# Patient Record
Sex: FEMALE | URBAN - METROPOLITAN AREA
[De-identification: names, ages, dates, MRNs, and addresses within clinical notes are randomized per-mention and may not be internally consistent; named-entity substitution may affect disease eponyms.]

---

## 2018-08-28 ENCOUNTER — APPOINTMENT (RX ONLY)
Dept: URBAN - METROPOLITAN AREA CLINIC 98 | Facility: CLINIC | Age: 63
Setting detail: DERMATOLOGY
End: 2018-08-28

## 2018-08-28 DIAGNOSIS — L23.9 ALLERGIC CONTACT DERMATITIS, UNSPECIFIED CAUSE: ICD-10-CM

## 2018-08-28 DIAGNOSIS — D485 NEOPLASM OF UNCERTAIN BEHAVIOR OF SKIN: ICD-10-CM

## 2018-08-28 PROBLEM — L85.3 XEROSIS CUTIS: Status: ACTIVE | Noted: 2018-08-28

## 2018-08-28 PROBLEM — D48.5 NEOPLASM OF UNCERTAIN BEHAVIOR OF SKIN: Status: ACTIVE | Noted: 2018-08-28

## 2018-08-28 PROBLEM — L30.9 DERMATITIS, UNSPECIFIED: Status: ACTIVE | Noted: 2018-08-28

## 2018-08-28 PROCEDURE — ? BIOPSY BY SHAVE METHOD

## 2018-08-28 PROCEDURE — 99202 OFFICE O/P NEW SF 15 MIN: CPT | Mod: 25

## 2018-08-28 PROCEDURE — ? TREATMENT REGIMEN

## 2018-08-28 PROCEDURE — ? PRESCRIPTION

## 2018-08-28 PROCEDURE — ? COUNSELING

## 2018-08-28 PROCEDURE — 11100: CPT

## 2018-08-28 RX ORDER — BETAMETHASONE DIPROPIONATE 0.5 MG/G
CREAM TOPICAL
Qty: 1 | Refills: 0 | Status: ERX | COMMUNITY
Start: 2018-08-28

## 2018-08-28 RX ADMIN — BETAMETHASONE DIPROPIONATE: 0.5 CREAM TOPICAL at 15:15

## 2018-08-28 ASSESSMENT — LOCATION ZONE DERM
LOCATION ZONE: FEET
LOCATION ZONE: LEG

## 2018-08-28 ASSESSMENT — LOCATION DETAILED DESCRIPTION DERM
LOCATION DETAILED: 2ND WEBSPACE RIGHT FOOT
LOCATION DETAILED: 1ST WEBSPACE RIGHT FOOT
LOCATION DETAILED: 3RD WEBSPACE RIGHT FOOT
LOCATION DETAILED: RIGHT PROXIMAL CALF

## 2018-08-28 ASSESSMENT — SEVERITY ASSESSMENT: SEVERITY: MILD TO MODERATE

## 2018-08-28 ASSESSMENT — PAIN INTENSITY VAS: HOW INTENSE IS YOUR PAIN 0 BEING NO PAIN, 10 BEING THE MOST SEVERE PAIN POSSIBLE?: 1/10 PAIN

## 2018-08-28 ASSESSMENT — BSA RASH: BSA RASH: 2

## 2018-08-28 ASSESSMENT — LOCATION SIMPLE DESCRIPTION DERM
LOCATION SIMPLE: RIGHT FOOT
LOCATION SIMPLE: RIGHT CALF

## 2018-08-28 NOTE — HPI: SKIN LESION
Is This A New Presentation, Or A Follow-Up?: Skin Lesion
How Severe Is Your Skin Lesion?: moderate
Has Your Skin Lesion Been Treated?: not been treated
Additional History: Pt tx miconazole 2% cream BID xmonths pt last dose x4days ago and stated applies OTC hydrocortisone \\n
Which Family Member (Optional)?: Sister

## 2018-08-28 NOTE — PROCEDURE: TREATMENT REGIMEN
Initiate Treatment: Vinegar/water soak BID followed by Betamethasone 0.05% cream BID under occlusion x1wk then start TAC 0.1% cream BID x1wk
Detail Level: Zone

## 2018-08-28 NOTE — PROCEDURE: BIOPSY BY SHAVE METHOD
Depth Of Biopsy: dermis
Electrodesiccation Text: The wound bed was treated with electrodesiccation after the biopsy was performed.
Biopsy Method: Dermablade
Notification Instructions: Patient will be notified of biopsy results. However, patient instructed to call the office if not contacted within 2 weeks.
Type Of Destruction Used: Curettage
Destruction After The Procedure: No
Hemostasis: Paula's
Lab: [id number]
Biopsy Type: H and E
Anesthesia Type: 1% lidocaine without epinephrine
Wound Care: Petrolatum
Detail Level: Simple
Consent: Verbal consent was obtained and risks were reviewed including but not limited to scarring, infection, bleeding, scabbing, incomplete removal, nerve damage and allergy to anesthesia.
Dressing: Band-Aid
Lab Facility: [id number]
Billing Type: Third-Party Bill
Was A Bandage Applied: Yes
Electrodesiccation And Curettage Text: The wound bed was treated with electrodesiccation and curettage after the biopsy was performed.
X Size Of Lesion In Cm: 0
Body Location Override (Optional - Billing Will Still Be Based On Selected Body Map Location If Applicable): right popliteal fossa
Anesthesia Volume In Cc (Will Not Render If 0): 1.5
Cryotherapy Text: The wound bed was treated with cryotherapy after the biopsy was performed.
Post-Care Instructions: 1) Gently wash the biopsy site with regular soap and water daily. If a scab develops, you can dilute hydrogen peroxide 1:1 with tap water and apply it to dissolve the crust.\\n2) Keep a small amount of white petrolatum (Aquaphor) and a non­stick bandage on the dressing at all times. Antibiotic ointments (Neosporin, etc) have not shown any superiority to simple petrolatum, cost more, and run the risk of a contact allergy. Keeping the wound covered has been shown to be superior to \"airing it out.\" If the bandage is irritating you, let us know and we can find a less­irritating alternative dressing together.\\n3) Notify the office if significant, persistent bleeding occurs from the biopsy site.\\n4) Do not expose the wound to fresh, salty, or brackish water until it has completely healed (after about 2 weeks). Tap or chlorinated water should be fine.\\n5) A small rim of redness and a small amount of yellow debris on the wound bed are normal. If you encounter increasing warmth, redness, pain, or liquid pus after the first day, these might indicate a localized infection and you should notify our office via phone or email.\\n6) If regular bandaids are uncomfortable or irritating, then oval hydrocolloid dressings (often sold as \"blister pads\") can be cut to fit and placed on the wound after the first 3­4 days, and changed out every 3­4 days. It is ok to wear these dressings in the shower or pool.\\n7) If stitches have been placed, you will need to return to the clinic in 1 or 2 weeks to have them professionally removed. Cutting the knots yourself may leave irritating portions of suture material under the skin.\\n8) Do not assume that \"no news is good news.\" If you have not received a call from our office within 7 days, please let us know so that we can investigate what might be holding up the biopsy report.\\n9) Wound care should continue until the biopsy site is pink, smooth, and dry with new skin, usually by 2 weeks.
Curettage Text: The wound bed was treated with curettage after the biopsy was performed.
Silver Nitrate Text: The wound bed was treated with silver nitrate after the biopsy was performed.

## 2020-05-28 ENCOUNTER — OFFICE VISIT (OUTPATIENT)
Dept: UROGYNECOLOGY | Facility: CLINIC | Age: 65
End: 2020-05-28
Payer: MEDICARE

## 2020-05-28 VITALS
SYSTOLIC BLOOD PRESSURE: 128 MMHG | WEIGHT: 122.56 LBS | DIASTOLIC BLOOD PRESSURE: 80 MMHG | BODY MASS INDEX: 19.7 KG/M2 | HEIGHT: 66 IN

## 2020-05-28 DIAGNOSIS — R39.15 URINARY URGENCY: Primary | ICD-10-CM

## 2020-05-28 DIAGNOSIS — R19.00 PELVIC MASS: ICD-10-CM

## 2020-05-28 DIAGNOSIS — R10.84 GENERALIZED ABDOMINAL PAIN: ICD-10-CM

## 2020-05-28 PROCEDURE — 99205 PR OFFICE/OUTPT VISIT, NEW, LEVL V, 60-74 MIN: ICD-10-PCS | Mod: S$PBB,,, | Performed by: OBSTETRICS & GYNECOLOGY

## 2020-05-28 PROCEDURE — 99205 OFFICE O/P NEW HI 60 MIN: CPT | Mod: S$PBB,,, | Performed by: OBSTETRICS & GYNECOLOGY

## 2020-05-28 PROCEDURE — 99999 PR PBB SHADOW E&M-NEW PATIENT-LVL III: ICD-10-PCS | Mod: PBBFAC,,, | Performed by: OBSTETRICS & GYNECOLOGY

## 2020-05-28 PROCEDURE — 87086 URINE CULTURE/COLONY COUNT: CPT

## 2020-05-28 PROCEDURE — 99203 OFFICE O/P NEW LOW 30 MIN: CPT | Mod: PBBFAC | Performed by: OBSTETRICS & GYNECOLOGY

## 2020-05-28 PROCEDURE — 99999 PR PBB SHADOW E&M-NEW PATIENT-LVL III: CPT | Mod: PBBFAC,,, | Performed by: OBSTETRICS & GYNECOLOGY

## 2020-05-28 NOTE — PROGRESS NOTES
Subjective:       Patient ID: Najma Hung is a 64 y.o. female.    Chief Complaint:  Dysuria and Bloated      History of Present Illness  HPI 64 Y O F  has no past medical history on file. referred for evaluation of urinary urgency and pain with urination.       [facility]     Question 5/28/2020 12:27 PM CDT - Filed by Patient on 5/28/2020   General Urogynecology: Are you experiencing the following?    Dysuria (painful urination) Yes   Nocturia:  waking up at night to empty your bladder  Yes   If you answered yes to the previous question, how many times does this happen per night? 5-6   Enuresis (urine loss during sleep) No   Dribbling urine after you urinate No   Hematuria (urine appears red) No   Type of stream Strong   Urinary frequency: How often a day are you going to the bathroom per day?  10-15   Urinary Tract Infections: How many Urinary Tract Infections have you had in the past year? I have not had a UTI in the past year   If you have had a UTI in the past year, what treatments have you had so far?  I have not had a UTI in the past year   Urinary Incontinence (General): Are you experiencing the following?    Past consultation for incontinence: Have you ever seen someone for the evaluation of incontinence? No   If you answered yes to the previous question, please select all the therapies you have tried.  None of the above   Please note the effectiveness of the therapies. Ineffective   Need to wear protection to keep clothes dry  No   If you answered yes to the previous question, please xenia the protection you use.  Panty liner   If you wear protection, how much wetness is typically on each pad? Scant   If you wear protection, how often do you have to change per day, if applicable?  0   Stress Symptoms: Are you experiencing the following?    Leakage of urine with cough, laugh and/or sneeze Yes   If you answered yes to the previous question, what is the frequency in days, weeks and/or months?  "Monthly   Leakage of urine with sex No   Leakage of urine with bending/ lifting No   Leakage of urine with briskly walking or jogging No   If you lose urine for any other reason not previously mentioned, please note it below, if applicable.     Urge Symptoms: Are you experiencing the following?    Urgency ("got to go" feeling) No   Urge: How frequently do you feel an urge to urinate (feeling like you "gotta go" to the bathroom and can't wait) Never   Do you experience a leakage of urine when you have a feeling of urgency?  No   Leakage of urine when unaware No   Past use of anticholinergics (medications used to treat overactive bladder) No   If you answered yes to the previous question, please xenia the anticholinergics you have used:     Have you ever used Mirbetriq (aka Mirabegron)?  No   Prolapse Symptoms: Are you experiencing any of the following?     Falling out/ Bulging/ Heaviness in the vagina No   Vaginal/ Abdominal Pain/ Pressure No   Need to strain/ Push to void No   Need to wait on the toilet before you void No   Unusual position to urinate (using your hands to push back the vaginal bulge) No   Sensation of incomplete emptying Yes   Past use of pessary device No   If you answered yes to the previous question, please list the devices you have used below.     Bowel Symptoms: Are you experiencing any of the following?    Constipation Yes   Diarrhea  Yes   Hematochezia (bloody stool) No   Incomplete evacuation of stool No   Involuntary loss of formed stool No   Fecal smearing/urgency No   Involuntary loss of gas No   Vaginal Symptoms: Are you experiencing any of the following?     Abnormal vaginal bleeding  No   Vaginal dryness No   Sexually active  No   Dyspareunia (painful intercourse) No   Estrogen use  No       GYN & OB History  No LMP recorded. Patient has had a hysterectomy.   Date of Last Pap: No result found     OB- none        GYN  Menarche: ?  Menstrual cycle: n/a  Menopause: ?  Hysterectomy:  " Fibroids, DIGNA  Ovaries: present     History reviewed. No pertinent past medical history.  Past Surgical History:   Procedure Laterality Date    HYSTERECTOMY      age 28    WISDOM TOOTH EXTRACTION       Review of patient's allergies indicates:   Allergen Reactions    Codeine     Pcn [penicillins]        No current outpatient medications on file.      OB History    Para Term  AB Living   0 0 0 0 0 0   SAB TAB Ectopic Multiple Live Births   0 0 0 0 0       Review of Systems  Review of Systems   Constitutional: Negative.  Negative for activity change, appetite change, chills, diaphoresis, fatigue, fever and unexpected weight change.   HENT: Negative.    Eyes: Negative.    Respiratory: Negative.  Negative for apnea, cough and wheezing.    Cardiovascular: Negative.  Negative for chest pain and palpitations.   Gastrointestinal: Negative for abdominal distention, abdominal pain, anal bleeding, blood in stool, constipation, diarrhea, nausea, rectal pain and vomiting.   Endocrine: Negative.    Genitourinary: Positive for frequency and urgency. Negative for decreased urine volume, difficulty urinating, dyspareunia, dysuria, enuresis, flank pain, genital sores, hematuria, menstrual problem, pelvic pain, vaginal bleeding, vaginal discharge and vaginal pain.   Musculoskeletal: Negative for back pain and gait problem.   Skin: Negative for color change, pallor, rash and wound.   Allergic/Immunologic: Negative for immunocompromised state.   Neurological: Negative.  Negative for dizziness and speech difficulty.   Hematological: Negative for adenopathy.   Psychiatric/Behavioral: Negative for agitation, behavioral problems, confusion and sleep disturbance.           Objective:     Physical Exam   Constitutional: She is oriented to person, place, and time. She appears well-developed.   HENT:   Head: Normocephalic and atraumatic.   Eyes: Conjunctivae and EOM are normal.   Neck: Normal range of motion. Neck supple.    Cardiovascular: Normal rate, regular rhythm, S1 normal, S2 normal, normal heart sounds and intact distal pulses.   Pulmonary/Chest: Effort normal and breath sounds normal. She exhibits no tenderness.   Abdominal: Soft. Bowel sounds are normal. She exhibits no distension and no mass. There is no hepatosplenomegaly, splenomegaly or hepatomegaly. There is no tenderness. There is no rigidity, no rebound, no guarding and no CVA tenderness. Hernia confirmed negative in the right inguinal area and confirmed negative in the left inguinal area.   Genitourinary: Pelvic exam was performed with patient supine. Rectum normal, vagina normal, skenes normal and bartholins normal. Right labia normal and left labia normal. Urethra exhibits hypermobility. Urethra exhibits no urethral caruncle, no urethral diverticulum and no urethral mass. Right bartholin is not enlarged and not tender. Left bartholin is not enlarged and not tender. Rectal exam shows resting tone normal and active tone normal. Rectal exam shows no external hemorrhoid, no fissure, no tenderness, anal tone normal and no dovetailing. Guaiac negative stool. There is atrophy in the vagina. No foreign body, tenderness, bleeding, unspecified prolapse of vaginal walls, fistula, mesh exposure or lavator tenderness in the vagina. No vaginal discharge found. Right adnexum displays no tenderness. Left adnexum displays no tenderness. Cervix exhibits absence. Uterus is absent.   PVR: 40 ML  Empty cough stress test: Negative.  Kegel: 2/5    POP-Q  Aa: -2 Ba: -2 C: -5   GH: 3 PB: 2 TVL: 8   Ap: -2 Bp: -2 D:                      Musculoskeletal: Normal range of motion.   Lymphadenopathy:     She has no axillary adenopathy.        Right: No inguinal adenopathy present.        Left: No inguinal adenopathy present.   Neurological: She is alert and oriented to person, place, and time. She has normal strength and normal reflexes. Cranial nerves II through XII intact. No cranial nerve  deficit.   Skin: Skin is warm, dry and intact.   Psychiatric: She has a normal mood and affect. Her speech is normal and behavior is normal. Judgment normal. Cognition and memory are normal.             Assessment:        1. Urinary urgency    2. Generalized abdominal pain    3. Pelvic mass               Plan:         1.  Urinary frequency  --Bladder diary for 3-7 days, write the number of times you go to the rest room and associated symptoms of urgency or leakage.   --Empty bladder every 3 hours.  Empty well: wait a minute, lean forward on toilet.    --Avoid dietary irritants (see sheet).  Keep diary x 3-5 days to determine your irritants.  --KEGELS: do 10 in AM and 10 in PM, holding each x 10 seconds.  When you feel urge to go, STOP, KEGEL, and when urge has passed, then go to bathroom.        2.  Vaginal atrophy (dryness):  Uses ah YES which helps her the most. Lubricant topically ; Does not want DHEA   May have lichens sclerosus  Coconut oil ( whole foods)     3. Pelvic mass   --TVS       Approximately 40 min were spent in consult, 90 % in discussion.     Thank you for requesting consultation of your patient.  I look forward to participating in her care.    Naif Veronica DO  Female Pelvic Medicine and Reconstructive Surgery  [facility] [place], [place]

## 2020-05-28 NOTE — PATIENT INSTRUCTIONS
1.  Urinary frequency  --Bladder diary for 3-7 days, write the number of times you go to the rest room and associated symptoms of urgency or leakage.   --Empty bladder every 3 hours.  Empty well: wait a minute, lean forward on toilet.    --Avoid dietary irritants (see sheet).  Keep diary x 3-5 days to determine your irritants.  --KEGELS: do 10 in AM and 10 in PM, holding each x 10 seconds.  When you feel urge to go, STOP, KEGEL, and when urge has passed, then go to bathroom.        2.  Vaginal atrophy (dryness):  Uses ah YES which helps her the most. Lubricant topically ; Does not want DHEA   May have lichens sclerosus  Coconut oil ( whole foods)     3. Pelvic mass   --TVS

## 2020-05-29 ENCOUNTER — HOSPITAL ENCOUNTER (OUTPATIENT)
Dept: RADIOLOGY | Facility: OTHER | Age: 65
Discharge: HOME | End: 2020-05-29
Attending: OBSTETRICS & GYNECOLOGY
Payer: MEDICARE

## 2020-05-29 DIAGNOSIS — R19.00 PELVIC MASS: ICD-10-CM

## 2020-05-29 LAB — BACTERIA UR CULT: NO GROWTH

## 2020-05-29 PROCEDURE — 76856 US EXAM PELVIC COMPLETE: CPT | Mod: 26,,, | Performed by: RADIOLOGY

## 2020-05-29 PROCEDURE — 76856 US PELVIS COMP WITH TRANSVAG NON-OB (XPD): ICD-10-PCS | Mod: 26,,, | Performed by: RADIOLOGY

## 2020-05-29 PROCEDURE — 76830 US PELVIS COMP WITH TRANSVAG NON-OB (XPD): ICD-10-PCS | Mod: 26,,, | Performed by: RADIOLOGY

## 2020-05-29 PROCEDURE — 76830 TRANSVAGINAL US NON-OB: CPT | Mod: TC

## 2020-05-29 PROCEDURE — 76830 TRANSVAGINAL US NON-OB: CPT | Mod: 26,,, | Performed by: RADIOLOGY

## 2020-06-01 ENCOUNTER — PATIENT MESSAGE (OUTPATIENT)
Dept: UROGYNECOLOGY | Facility: CLINIC | Age: 65
End: 2020-06-01

## 2020-06-01 ENCOUNTER — TELEPHONE (OUTPATIENT)
Dept: UROGYNECOLOGY | Facility: CLINIC | Age: 65
End: 2020-06-01

## 2020-06-01 DIAGNOSIS — D49.89 NEOPLASM OF PELVIS: ICD-10-CM

## 2020-06-01 NOTE — TELEPHONE ENCOUNTER
----- Message from Henry Mcnamara sent at 6/1/2020  3:10 PM CDT -----  Contact: YESSENIA MCDONALD [[id number]]  Name of Who is Calling: YESSENIA MCDONALD [[id number]]      What is the request in detail: Would like to speak with staff in regards to scheduling a follow up before July 21. Please advise    Can the clinic reply by [facility]: yes      [facility] [phone]

## 2020-06-02 ENCOUNTER — TELEPHONE (OUTPATIENT)
Dept: UROGYNECOLOGY | Facility: CLINIC | Age: 65
End: 2020-06-02

## 2020-06-02 NOTE — TELEPHONE ENCOUNTER
----- Message from Naif Veronica DO sent at 6/1/2020  2:19 PM CDT -----  Please call the patient to let her know that the urine results are normal. Thank you

## 2020-06-02 NOTE — TELEPHONE ENCOUNTER
LMOR.  Notified patient that urine culture was normal.  Asked patient to call ([phone]) with any questions.  Call ended.

## 2020-06-03 ENCOUNTER — HOSPITAL ENCOUNTER (OUTPATIENT)
Dept: RADIOLOGY | Facility: OTHER | Age: 65
Discharge: HOME | End: 2020-06-03
Attending: OBSTETRICS & GYNECOLOGY
Payer: MEDICARE

## 2020-06-03 DIAGNOSIS — D49.89 NEOPLASM OF PELVIS: ICD-10-CM

## 2020-06-03 PROCEDURE — 74177 CT ABD & PELVIS W/CONTRAST: CPT | Mod: 26,,, | Performed by: RADIOLOGY

## 2020-06-03 PROCEDURE — 74177 CT ABDOMEN PELVIS WITH CONTRAST: ICD-10-PCS | Mod: 26,,, | Performed by: RADIOLOGY

## 2020-06-03 PROCEDURE — 25500020 PHARM REV CODE 255: Performed by: OBSTETRICS & GYNECOLOGY

## 2020-06-03 PROCEDURE — 74177 CT ABD & PELVIS W/CONTRAST: CPT | Mod: TC

## 2020-06-03 RX ADMIN — IOHEXOL 75 ML: 350 INJECTION, SOLUTION INTRAVENOUS at 02:06

## 2020-06-03 NOTE — TELEPHONE ENCOUNTER
LMOR - Notified patient the urine culture is negative,  Left phone number ([phone]) for her to call with any questions.  Call ended

## 2020-06-08 ENCOUNTER — TELEPHONE (OUTPATIENT)
Dept: INTERNAL MEDICINE | Facility: CLINIC | Age: 65
End: 2020-06-08

## 2020-06-08 ENCOUNTER — TELEPHONE (OUTPATIENT)
Dept: ADMINISTRATIVE | Facility: OTHER | Age: 65
End: 2020-06-08

## 2020-06-08 NOTE — TELEPHONE ENCOUNTER
----- Message from Janice Maria sent at 6/8/2020  4:35 PM CDT -----  Contact: Self (Ole Lacey wife) [phone]  Patient would like an call back form the nurse in regards to second opinion, please advise.

## 2020-06-09 ENCOUNTER — TELEPHONE (OUTPATIENT)
Dept: UROGYNECOLOGY | Facility: CLINIC | Age: 65
End: 2020-06-09

## 2020-06-09 ENCOUNTER — TELEPHONE (OUTPATIENT)
Dept: INTERNAL MEDICINE | Facility: CLINIC | Age: 65
End: 2020-06-09

## 2020-06-09 ENCOUNTER — TELEPHONE (OUTPATIENT)
Dept: GYNECOLOGIC ONCOLOGY | Facility: CLINIC | Age: 65
End: 2020-06-09

## 2020-06-09 DIAGNOSIS — D39.9 NEOPLASM OF UNCERTAIN BEHAVIOR OF FEMALE GENITAL ORGAN, UNSPECIFIED: ICD-10-CM

## 2020-06-09 DIAGNOSIS — G89.3 NEOPLASM RELATED PAIN (ACUTE) (CHRONIC): ICD-10-CM

## 2020-06-09 DIAGNOSIS — R19.00 PELVIC MASS: Primary | ICD-10-CM

## 2020-06-09 NOTE — TELEPHONE ENCOUNTER
Left voicemail on home and mobile asking that the patient give Dr. Song office a call back to schedule an appointment will call again  ----- Message from Rose Sapp RN sent at 6/9/2020  1:30 PM CDT -----  Regarding: FW: CONSULT  Please schedule pt for consult with dr song  ----- Message -----  From: Davina Lawson  Sent: 6/9/2020   1:27 PM CDT  To: Duncan Moore Staff  Subject: CONSULT                                          Dr. Jeremías Nolasco is referring Ms. Hung to see Dr. Song for a Pelvic Mass.  Please contact patient for scheduling.  Thanks.

## 2020-06-09 NOTE — TELEPHONE ENCOUNTER
----- Message from Teresa Song MD sent at 6/8/2020  3:06 PM CDT -----  Yes, I need to see her. Thanks for reaching out.   Can you order  and CEA for tumor markers. Otherwise I think we have all we need with the imaging.     Kaci--please arrange for consult with me. Thank you.   ----- Message -----  From: Naif Veronica DO  Sent: 6/8/2020   2:45 PM CDT  To: MD Wesley Mock,   How are you? I hope you doing well. I recently saw this patient for urinary urgency, found that she has a large pelvic mass. I got a CT confirming the findings. I've put her in to see you. Was thinking about putting tumor marker orders for her. Anything else that I can order so that you have all the information needed when she come to see you?  Rhoda

## 2020-06-09 NOTE — TELEPHONE ENCOUNTER
Return patient call no answer.   ----- Message from Rose Sapp RN sent at 6/9/2020  4:03 PM CDT -----  Contact: YESSENIA MCDONALD [[id number]]      ----- Message -----  From: Melinda Cash  Sent: 6/9/2020   4:01 PM CDT  To: [facility] Staff    Name of Who is Calling: YESSENIA MCDONALD [[id number]]    What is the request in detail:YESSENIA MCDONALD [[id number]] is calling in regards to an appointment ...  Please contact to further discuss and advise      Can the clinic reply by [facility]: no     What [facility]  [phone] (home)

## 2020-06-09 NOTE — TELEPHONE ENCOUNTER
pls call gynecologic oncology - she would like to see Dr Hodgson or Ladi this week for pelvic mass.    Referral is in system

## 2020-06-10 ENCOUNTER — TELEPHONE (OUTPATIENT)
Dept: GYNECOLOGIC ONCOLOGY | Facility: CLINIC | Age: 65
End: 2020-06-10

## 2020-06-10 NOTE — TELEPHONE ENCOUNTER
----- Message from Alexis Pressley RN sent at 6/10/2020  1:39 PM CDT -----      ----- Message -----  From: Rosmery Rowell  Sent: 6/10/2020   1:18 PM CDT  To: Rema Allan Staff    Please contact patient for a appointment.

## 2020-06-11 ENCOUNTER — TELEPHONE (OUTPATIENT)
Dept: UROGYNECOLOGY | Facility: CLINIC | Age: 65
End: 2020-06-11

## 2020-06-11 NOTE — TELEPHONE ENCOUNTER
Attempted to return pt's call regarding not receiving any further phone call from urogynecology because she is no longer  patient's then receiving another message with her requesting an appointment no answer left message.

## 2020-06-11 NOTE — TELEPHONE ENCOUNTER
----- Message from Melinda Cash sent at 6/11/2020 11:25 AM CDT -----  Contact: YESSENIA MCDONALD [[id number]]  Name of Who is Calling: YESSENIA MCDONALD [[id number]]     What is the request in detail: YESSENIA MCDONALD [[id number]] is calling in regards to  an appointment .... Please contact to further discuss and advise      Can the clinic reply by [facility]: no     [facility]  [phone] (home)

## 2020-06-12 ENCOUNTER — TELEPHONE (OUTPATIENT)
Dept: UROGYNECOLOGY | Facility: CLINIC | Age: 65
End: 2020-06-12

## 2022-07-12 ENCOUNTER — TELEPHONE (OUTPATIENT)
Dept: OBSTETRICS AND GYNECOLOGY | Facility: CLINIC | Age: 67
End: 2022-07-12
Payer: MEDICARE

## 2022-07-13 ENCOUNTER — TELEPHONE (OUTPATIENT)
Dept: HEMATOLOGY/ONCOLOGY | Facility: CLINIC | Age: 67
End: 2022-07-13
Payer: MEDICARE

## 2022-07-13 NOTE — TELEPHONE ENCOUNTER
"----- Message from Roro Ríos sent at 7/13/2022  3:53 PM CDT -----  Regarding: Speak with office  Contact: Najma  Consult/Advisory:       Name Of Caller: Najma      Contact Preference?[phone] (Home Phone)         Does patient feel the need to be seen today? No      What is the nature of the call?: Pt is calling to speak with nurse about her current situation and possible be seen.       Additional Notes:  "Thank you for all that you do for our patients'"      "

## 2022-07-15 ENCOUNTER — TELEPHONE (OUTPATIENT)
Dept: HEMATOLOGY/ONCOLOGY | Facility: CLINIC | Age: 67
End: 2022-07-15
Payer: MEDICARE

## 2022-07-15 NOTE — TELEPHONE ENCOUNTER
Spoke with patient who saw video with Dr. Aceves and Dr. Mike and would like recommendation for Gyn Onc who embraces integrative medicine.  Offered patient number to department to get more information but she requested a message be sent to Dr. Aceves to recommend a doctor.

## 2022-07-15 NOTE — TELEPHONE ENCOUNTER
"----- Message from Luanne Au sent at 7/14/2022 10:47 AM CDT -----  Consult/Advisory:           Name Of Caller: self    Contact Preference?: [phone]    What is the nature of the call?: requesting a call back in regards to advise on GYN ONC providers that are more on integrative medicine           Additional Notes:  "Thank you for all that you do for our patients'"     "

## 2022-07-20 ENCOUNTER — OFFICE VISIT (OUTPATIENT)
Dept: UROGYNECOLOGY | Facility: CLINIC | Age: 67
End: 2022-07-20
Payer: MEDICARE

## 2022-07-20 VITALS
BODY MASS INDEX: 19.03 KG/M2 | HEIGHT: 66 IN | DIASTOLIC BLOOD PRESSURE: 74 MMHG | SYSTOLIC BLOOD PRESSURE: 173 MMHG | WEIGHT: 118.38 LBS | HEART RATE: 79 BPM

## 2022-07-20 DIAGNOSIS — N32.81 OAB (OVERACTIVE BLADDER): Primary | ICD-10-CM

## 2022-07-20 DIAGNOSIS — M62.89 HIGH-TONE PELVIC FLOOR DYSFUNCTION IN FEMALE: ICD-10-CM

## 2022-07-20 PROCEDURE — 99999 PR PBB SHADOW E&M-EST. PATIENT-LVL III: CPT | Mod: PBBFAC,,, | Performed by: OBSTETRICS & GYNECOLOGY

## 2022-07-20 PROCEDURE — 99999 PR PBB SHADOW E&M-EST. PATIENT-LVL III: ICD-10-PCS | Mod: PBBFAC,,, | Performed by: OBSTETRICS & GYNECOLOGY

## 2022-07-20 PROCEDURE — 99213 OFFICE O/P EST LOW 20 MIN: CPT | Mod: PBBFAC | Performed by: OBSTETRICS & GYNECOLOGY

## 2022-07-20 PROCEDURE — 99215 OFFICE O/P EST HI 40 MIN: CPT | Mod: S$PBB,,, | Performed by: OBSTETRICS & GYNECOLOGY

## 2022-07-20 PROCEDURE — 99215 PR OFFICE/OUTPT VISIT, EST, LEVL V, 40-54 MIN: ICD-10-PCS | Mod: S$PBB,,, | Performed by: OBSTETRICS & GYNECOLOGY

## 2022-07-20 NOTE — PATIENT INSTRUCTIONS
1.  OAB   --Bladder diary for 3-7 days, write the number of times you go to the rest room and associated symptoms of urgency or leakage.   --Empty bladder every 3 hours.  Empty well: wait a minute, lean forward on toilet.    --Avoid dietary irritants (see sheet).  Keep diary x 3-5 days to determine your irritants.  --KEGELS: do 10 in AM and 10 in PM, holding each x 10 seconds.  When you feel urge to go, STOP, KEGEL, and when urge has passed, then go to bathroom.  Start pelvic floor PT     --URGE: start pelvic floor PT  --STRESS:  Pessary vs. Sling.     2. Vaginal atrophy (dryness):  Use vaginal Key E- nightly

## 2022-07-20 NOTE — PROGRESS NOTES
Subjective:       Patient ID: Najma Hung is a 66 y.o. female.    Chief Complaint:  Discuss Surgery      History of Present Illness  HPI 66 Y O F  has a past medical history of Ovarian cancer (). urinary urgency and leakage. She has a history of stage 3 ovarian cancer s/p DIGNA/BSO debulking and Chemo managed by Dr. Sidhu at [facility]. She returns to discuss treatment options for her urinary incontinence.       GYN & OB History  No LMP recorded. Patient has had a hysterectomy.   Date of Last Pap: No result found    OB History    Para Term  AB Living   0 0 0 0 0 0   SAB IAB Ectopic Multiple Live Births   0 0 0 0 0       Review of Systems  Review of Systems   Constitutional: Negative.  Negative for activity change, appetite change, chills, diaphoresis, fatigue, fever and unexpected weight change.   HENT: Negative.    Eyes: Negative.    Respiratory: Negative.  Negative for apnea, cough and wheezing.    Cardiovascular: Negative.  Negative for chest pain and palpitations.   Gastrointestinal: Negative for abdominal distention, abdominal pain, anal bleeding, blood in stool, constipation, diarrhea, nausea, rectal pain and vomiting.   Endocrine: Negative.    Genitourinary: Positive for frequency and urgency. Negative for decreased urine volume, difficulty urinating, dyspareunia, dysuria, enuresis, flank pain, genital sores, hematuria, menstrual problem, pelvic pain, vaginal bleeding, vaginal discharge and vaginal pain.   Musculoskeletal: Negative for back pain and gait problem.   Skin: Negative for color change, pallor, rash and wound.   Allergic/Immunologic: Negative for immunocompromised state.   Neurological: Negative.  Negative for dizziness and speech difficulty.   Hematological: Negative for adenopathy.   Psychiatric/Behavioral: Negative for agitation, behavioral problems, confusion and sleep disturbance.           Objective:     Physical Exam   Constitutional: She is oriented to person, place, and  time. She appears well-developed.   HENT:   Head: Normocephalic and atraumatic.   Eyes: Conjunctivae and EOM are normal.   Cardiovascular: Normal rate, regular rhythm, S1 normal, S2 normal, normal heart sounds and intact distal pulses.   Pulmonary/Chest: Effort normal and breath sounds normal. She exhibits no tenderness.   Abdominal: Soft. Bowel sounds are normal. She exhibits no distension and no mass. There is no splenomegaly or hepatomegaly. There is no abdominal tenderness. There is no rigidity, no rebound and no guarding. Hernia confirmed negative in the right inguinal area and confirmed negative in the left inguinal area.   Genitourinary: Pelvic exam was performed with patient supine. Rectum normal, vagina normal, skenes normal and bartholins normal. Right labia normal and left labia normal. Urethra exhibits hypermobility. Urethra exhibits no urethral caruncle, no urethral diverticulum and no urethral mass. Right bartholin is not enlarged and not tender. Left bartholin is not enlarged and not tender. Rectal exam shows resting tone normal and active tone normal. Rectal exam shows no external hemorrhoid, no fissure, no tenderness, anal tone normal and no dovetailing. Guaiac negative stool. There is atrophy and lavator tenderness in the vagina. No foreign body, tenderness, bleeding, unspecified prolapse of vaginal walls, fistula or mesh exposure in the vagina. Right adnexum displays no tenderness. Left adnexum displays no tenderness. Uterus is absent.   PVR: 60 ML  Empty cough stress test: Negative.  Kegel: 1/5    POP-Q  Aa: -2 Ba: -2 C: -5   GH: 2 PB: 2 TVL: 8   Ap: -2 Bp: -2 D:                      Musculoskeletal:         General: Normal range of motion.      Cervical back: Normal range of motion and neck supple.   Neurological: She is alert and oriented to person, place, and time. She has normal strength and normal reflexes. Cranial nerves II through XII intact. No cranial nerve deficit.   Skin: Skin is warm  and dry.   Psychiatric: She has a normal mood and affect. Her speech is normal and behavior is normal. Judgment normal.          Assessment:        1. OAB (overactive bladder)    2. High-tone pelvic floor dysfunction in female             Plan:        1.  OAB   --Bladder diary for 3-7 days, write the number of times you go to the rest room and associated symptoms of urgency or leakage.   --Empty bladder every 3 hours.  Empty well: wait a minute, lean forward on toilet.    --Avoid dietary irritants (see sheet).  Keep diary x 3-5 days to determine your irritants.  --KEGELS: do 10 in AM and 10 in PM, holding each x 10 seconds.  When you feel urge to go, STOP, KEGEL, and when urge has passed, then go to bathroom.  Start pelvic floor PT     --URGE: start pelvic floor PT  --STRESS:  Pessary vs. Sling vs Revive     2. Vaginal atrophy (dryness):  Use vaginal Key E- nightly  Extraversion olive oil      Approximately 45 minutes of total time spent in consult, 75 % in discussion. This includes face to face time and non-face to face time preparing to see the patient, obtaining and/or reviewing separately obtained history, documenting clinical information in the electronic or other health record, independently interpreting results (not separately reported) and communicating results to the patient/family/caregiver, or care coordination (not separately reported).        Naif Veronica DO  Female Pelvic Medicine and Reconstructive Surgery  [facility] [place], [place]

## 2022-07-22 ENCOUNTER — TELEPHONE (OUTPATIENT)
Dept: UROGYNECOLOGY | Facility: CLINIC | Age: 67
End: 2022-07-22
Payer: MEDICARE

## 2022-07-22 DIAGNOSIS — M62.89 HIGH-TONE PELVIC FLOOR DYSFUNCTION IN FEMALE: ICD-10-CM

## 2022-07-22 DIAGNOSIS — N32.81 OAB (OVERACTIVE BLADDER): Primary | ICD-10-CM

## 2022-07-22 NOTE — TELEPHONE ENCOUNTER
----- Message from Leonela Reyes sent at 7/22/2022 10:18 AM CDT -----  Pt is call to get an Rx for Vaginal Therapy sent over to [facility]  [address] [place] [address]Tw number [phone]. Pt sts that her ins will cover it there. Pt spoke with Angela. Pt can be reached at [phone]

## 2022-07-22 NOTE — TELEPHONE ENCOUNTER
Pt is requesting her pt referral to be external. originally made internal;, I will fax over referral to [facility] once switched in the system

## 2022-10-26 ENCOUNTER — TELEPHONE (OUTPATIENT)
Dept: UROGYNECOLOGY | Facility: CLINIC | Age: 67
End: 2022-10-26
Payer: MEDICARE

## 2022-10-26 DIAGNOSIS — M62.89 HIGH-TONE PELVIC FLOOR DYSFUNCTION IN FEMALE: ICD-10-CM

## 2022-10-26 DIAGNOSIS — N32.81 OAB (OVERACTIVE BLADDER): Primary | ICD-10-CM

## 2022-10-26 NOTE — TELEPHONE ENCOUNTER
----- Message from Daniela Danielle MA sent at 10/26/2022  9:31 AM CDT -----  Regarding: FW: referral needed  Could someone add an external pelvic floor therapy referral for this patietn? Once complete I can fax out       FAYE Dumont  ----- Message -----  From: Glenis Cuenca  Sent: 10/26/2022   9:14 AM CDT  To: Jeremías Mcintyre Staff  Subject: referral needed                                  Name of Who is Calling: [facility]            What is the request in detail: Kelly is requesting a referral to be sent over for patient Pelvic floor therapy. Fax is  [phone].           Can the clinic reply by [facility]: No           What Number to Call Back [facility] not [facility]: [phone]

## 2022-10-26 NOTE — TELEPHONE ENCOUNTER
----- Message from Robert Ventura PA-C sent at 10/26/2022 12:27 PM CDT -----  Regarding: RE: referral needed  External order is in  ----- Message -----  From: Glenis Cuenca  Sent: 10/26/2022   9:14 AM CDT  To: Jeremías Mcintyre Staff  Subject: referral needed                                  Name of Who is Calling: [facility]            What is the request in detail: Kelly is requesting a referral to be sent over for patient Pelvic floor therapy. Fax is  [phone].           Can the clinic reply by [facility]: No           What Number to Call Back [facility] not [facility]: [phone]

## 2023-05-23 ENCOUNTER — PATIENT MESSAGE (OUTPATIENT)
Dept: RESEARCH | Facility: HOSPITAL | Age: 68
End: 2023-05-23
Payer: MEDICARE

## 2023-07-03 ENCOUNTER — PATIENT MESSAGE (OUTPATIENT)
Dept: HEMATOLOGY/ONCOLOGY | Facility: CLINIC | Age: 68
End: 2023-07-03
Payer: MEDICARE

## 2023-07-03 ENCOUNTER — TELEPHONE (OUTPATIENT)
Dept: HEMATOLOGY/ONCOLOGY | Facility: CLINIC | Age: 68
End: 2023-07-03
Payer: MEDICARE

## 2023-07-03 ENCOUNTER — TELEPHONE (OUTPATIENT)
Dept: OBSTETRICS AND GYNECOLOGY | Facility: CLINIC | Age: 68
End: 2023-07-03
Payer: MEDICARE

## 2023-07-03 DIAGNOSIS — T45.1X5A CHEMOTHERAPY-INDUCED NEUROPATHY: ICD-10-CM

## 2023-07-03 DIAGNOSIS — F41.9 ANXIETY: ICD-10-CM

## 2023-07-03 DIAGNOSIS — G89.29 CHRONIC LOW BACK PAIN: ICD-10-CM

## 2023-07-03 DIAGNOSIS — C56.9 OVARIAN CANCER: Primary | ICD-10-CM

## 2023-07-03 DIAGNOSIS — M54.50 CHRONIC LOW BACK PAIN: ICD-10-CM

## 2023-07-03 DIAGNOSIS — G62.0 CHEMOTHERAPY-INDUCED NEUROPATHY: ICD-10-CM

## 2023-07-03 NOTE — TELEPHONE ENCOUNTER
----- Message from Glenis Cuenca sent at 7/3/2023  8:11 AM CDT -----  Regarding: appointment  Name of Who is Calling: Najma           What is the request in detail: Patient is requesting a call back to schedule an appointment for a second opinion. She will explain more when called. She said it's time sensitive.           Can the clinic reply by [facility]: No           What Number to Call Back if not in [facility]: [phone]

## 2023-07-05 ENCOUNTER — TELEPHONE (OUTPATIENT)
Dept: GYNECOLOGIC ONCOLOGY | Facility: CLINIC | Age: 68
End: 2023-07-05
Payer: MEDICARE

## 2023-07-05 NOTE — NURSING
Patient phoned to seek self-referral for Integrative Oncology, desires greater overall well-being. Currently taking Lynparza, she has a rising Ca125, PetCT this week, currently sees Dr. Olivier, desires to seek second opinion from Dr. Jerrell Hodgson of [facility]. Interested in latest available research. Appt coordinated for Integrative Oncology this Friday 7/7/23. Staff message sent to coordinate Gyn [facility]c referral, EMILE Malhotra.

## 2023-07-05 NOTE — TELEPHONE ENCOUNTER
----- Message from Lisa Ny sent at 7/5/2023  2:07 PM CDT -----  Name of Who is Calling:YESSENIA MCDONALD [[id number]]           What is the request in detail:pt stated that she would like to speak with the office directly in regards to her questions/concerns.Please contact to further discuss and advise.            Can the clinic reply by [facility]:[phone]           What Number to Call Back if not in [facility]:[phone]

## 2023-07-06 ENCOUNTER — TELEPHONE (OUTPATIENT)
Dept: GYNECOLOGIC ONCOLOGY | Facility: CLINIC | Age: 68
End: 2023-07-06
Payer: MEDICARE

## 2023-07-06 RX ORDER — VIT C/E/ZN/COPPR/LUTEIN/ZEAXAN 250MG-90MG
7000 CAPSULE ORAL
COMMUNITY

## 2023-07-06 RX ORDER — LIDOCAINE AND PRILOCAINE 25; 25 MG/G; MG/G
CREAM TOPICAL
COMMUNITY
Start: 2023-02-10 | End: 2024-02-10

## 2023-07-06 RX ORDER — ONDANSETRON 4 MG/1
4 TABLET, FILM COATED ORAL
COMMUNITY
Start: 2023-06-15 | End: 2023-09-12 | Stop reason: SDUPTHER

## 2023-07-06 RX ORDER — LIDOCAINE AND PRILOCAINE 25; 25 MG/G; MG/G
CREAM TOPICAL
COMMUNITY
Start: 2023-02-10 | End: 2023-07-06 | Stop reason: SDUPTHER

## 2023-07-06 RX ORDER — VITAMIN B COMPLEX
1 CAPSULE ORAL
COMMUNITY

## 2023-07-07 ENCOUNTER — OFFICE VISIT (OUTPATIENT)
Dept: GYNECOLOGIC ONCOLOGY | Facility: CLINIC | Age: 68
End: 2023-07-07
Attending: OBSTETRICS & GYNECOLOGY
Payer: MEDICARE

## 2023-07-07 ENCOUNTER — OFFICE VISIT (OUTPATIENT)
Dept: HEMATOLOGY/ONCOLOGY | Facility: CLINIC | Age: 68
End: 2023-07-07
Attending: OBSTETRICS & GYNECOLOGY
Payer: MEDICARE

## 2023-07-07 ENCOUNTER — TELEPHONE (OUTPATIENT)
Dept: OBSTETRICS AND GYNECOLOGY | Facility: CLINIC | Age: 68
End: 2023-07-07
Payer: MEDICARE

## 2023-07-07 VITALS
HEART RATE: 73 BPM | SYSTOLIC BLOOD PRESSURE: 137 MMHG | BODY MASS INDEX: 20.13 KG/M2 | HEIGHT: 66 IN | DIASTOLIC BLOOD PRESSURE: 63 MMHG | OXYGEN SATURATION: 97 % | WEIGHT: 125.25 LBS

## 2023-07-07 VITALS — WEIGHT: 125 LBS | BODY MASS INDEX: 20.18 KG/M2

## 2023-07-07 DIAGNOSIS — Z51.11 ENCOUNTER FOR ANTINEOPLASTIC CHEMOTHERAPY: Primary | ICD-10-CM

## 2023-07-07 DIAGNOSIS — C56.9 OVARIAN CANCER: ICD-10-CM

## 2023-07-07 DIAGNOSIS — R63.4 WEIGHT LOSS: ICD-10-CM

## 2023-07-07 DIAGNOSIS — C56.9 MALIGNANT NEOPLASM OF OVARY, UNSPECIFIED LATERALITY: ICD-10-CM

## 2023-07-07 DIAGNOSIS — N89.8 VAGINAL IRRITATION: Primary | ICD-10-CM

## 2023-07-07 PROCEDURE — 99213 OFFICE O/P EST LOW 20 MIN: CPT | Mod: PBBFAC,27 | Performed by: OBSTETRICS & GYNECOLOGY

## 2023-07-07 PROCEDURE — 99215 PR OFFICE/OUTPT VISIT, EST, LEVL V, 40-54 MIN: ICD-10-PCS | Mod: S$PBB,,, | Performed by: OBSTETRICS & GYNECOLOGY

## 2023-07-07 PROCEDURE — 99215 OFFICE O/P EST HI 40 MIN: CPT | Mod: S$PBB,,, | Performed by: OBSTETRICS & GYNECOLOGY

## 2023-07-07 PROCEDURE — 99999 PR PBB SHADOW E&M-EST. PATIENT-LVL III: ICD-10-PCS | Mod: PBBFAC,,, | Performed by: OBSTETRICS & GYNECOLOGY

## 2023-07-07 PROCEDURE — 99999 PR PBB SHADOW E&M-EST. PATIENT-LVL IV: ICD-10-PCS | Mod: PBBFAC,,, | Performed by: OBSTETRICS & GYNECOLOGY

## 2023-07-07 PROCEDURE — 81514 NFCT DS BV&VAGINITIS DNA ALG: CPT | Performed by: OBSTETRICS & GYNECOLOGY

## 2023-07-07 PROCEDURE — 99205 PR OFFICE/OUTPT VISIT, NEW, LEVL V, 60-74 MIN: ICD-10-PCS | Mod: S$PBB,,, | Performed by: OBSTETRICS & GYNECOLOGY

## 2023-07-07 PROCEDURE — 99205 OFFICE O/P NEW HI 60 MIN: CPT | Mod: S$PBB,,, | Performed by: OBSTETRICS & GYNECOLOGY

## 2023-07-07 PROCEDURE — 99999 PR PBB SHADOW E&M-EST. PATIENT-LVL IV: CPT | Mod: PBBFAC,,, | Performed by: OBSTETRICS & GYNECOLOGY

## 2023-07-07 PROCEDURE — 99999 PR PBB SHADOW E&M-EST. PATIENT-LVL III: CPT | Mod: PBBFAC,,, | Performed by: OBSTETRICS & GYNECOLOGY

## 2023-07-07 PROCEDURE — 99214 OFFICE O/P EST MOD 30 MIN: CPT | Mod: PBBFAC | Performed by: OBSTETRICS & GYNECOLOGY

## 2023-07-07 NOTE — TELEPHONE ENCOUNTER
----- Message from Tremontana Chevalier sent at 7/7/2023  9:47 AM CDT -----  Regarding: pt advice  RUNNING LATE FOR APPT -     Provider:  Rashid    Reason: pt was delayed at her prev appt with [facility] and is on her way    Appt time: 9:30    SWEETIE Pfeiffer with [facility] calling to advise that pt is late for her 9:30 appt with Dr. Mike today - eta around 10:30 and parking time

## 2023-07-07 NOTE — PROGRESS NOTES
Integrative Health and Medicine Initial Visit      Chief Complaint: vaginal dryness, address overall wellness    Patient is a 66 yo post-menopausal female with metastatic Papillary Serous Ovarian Carcinoma. Currently experiencing a rising CA-125 on Lynparza, disease progression noted on recent PetCT. She is saw  Dr. Jerrell Hodgson of [facility] this morning for a second opinion in light of recent findings. She feeling more at ease since meeting with Dr. Hodgson this morning  She reports vaginal dryness that she treats with vitamin-E suppositories and yes organic moisturizer.  She recently reports having a strange smell vaginally.  She denies vaginal discharge  She has worked with Verónica simpson in the past for pelvic floor and is now doing somatic [facility] with [facility] at [facility].  She is met Dr. Andrew Weil in the past and is very interested in integrative therapies  She reports some food intolerance and allergies.  She is lactose intolerant and chose to be gluten free but has added gluten and recently she felt that she lost too much weight when she was gluten free.  Cancer/Stage/TNM:   Cancer Staging   No matching staging information was found for the patient.     Oncology History    No history exists.         Past Medical History:   Diagnosis Date    CKD (chronic kidney disease)     IBS (irritable bowel syndrome)     OAB (overactive bladder)     Ovarian cancer 2020    stage iv metastatic        Current Outpatient Medications   Medication Instructions    b complex vitamins capsule 1 capsule, Oral    cholecalciferol (vitamin D3) (VITAMIN D3) 7,000 Units, Oral    digestive enzymes Cap Oral    LIDOcaine-prilocaine (EMLA) cream Please apply to area 1 hour prior to procedure.    olaparib 200 mg, Oral    ondansetron (ZOFRAN) 4 mg, Oral        Past Surgical History:   Procedure Laterality Date    HYSTERECTOMY      age 28    WISDOM TOOTH EXTRACTION            Distress Score:        Today the patient described the  "following:  Nutrition: 2 servings of fruits and vegetables per day  Exercise: more than 30 minutes per day- used to but not currently  Sleep: more than 6 hours        7 Pillars Assessment      Sleep  How many hours of sleep per night? 7 hours  Do you have trouble falling asleep, staying asleep or waking up earlier than you need to? yes  Do you have daytime fatigue? yes  Do you need medication for sleep? yes - Zofran  Do you use any supplements or other interventions for sleep? no    Resilience  Rate your current level of stress- high  How do you manage stress? Exercise and Meditation, mindfulness, body scan, music    Purpose  Do you feel you have a vision or a life purpose? Yes    Environment  Any exposures:no known exposures    Spirituality- no formal Yazidi practice    Nutrition   Food allergies or sensitivities: yes  Do you adhere to a particular type of diet? yes  What type of diet do you follow? No dairy, was following gluten free  Do you have any concerns with your eating habits? yes- need more variety  Are you concerned with your level of alcohol intake? no    Exercise  How would you describe your physical activity level? active  Do you work at a sedentary job? no  What do you do for physical activity? Used to walk 4-6 times per week      Physical Exam   /63 weight 125 pounds Height5'6"  Wt Readings from Last 3 Encounters:   07/07/23 56.7 kg (125 lb)   07/20/22 53.7 kg (118 lb 6.2 oz)   05/28/20 55.6 kg (122 lb 9.2 oz)     Temp Readings from Last 3 Encounters:   No data found for Temp     BP Readings from Last 3 Encounters:   07/20/22 (!) 173/74   05/28/20 128/80     Pulse Readings from Last 3 Encounters:   07/20/22 79       Body mass index is There is no height or weight on file to calculate BMI.    Vitals reviewed.   Constitutional:       General: Patient is not in acute distress.     Appearance: Normal appearance.     Psychiatric:         Mood and Affect: Mood normal.         Behavior: Behavior " normal.         Thought Content: Thought content normal.         Judgment: Judgment normal.      Review of Systems:   Cardiac:           No SOB, chest pain with exertion,edema, orthopnea  Distress:          No excessive sadness, no hopelessness, no anhedonia, no excessive worry or nervousness  Cognitive:        No trouble with memory, no difficulty paying attention, no brain fog, no trouble functioning with work or home life  Fatigue:           Energy level adequate, performing ADL's, no morning fatigue                           Fatigue  5  / 10  ( Scale 0 - 10)   Hormonal:       No hot flashes, no night sweats  Pain:                Has  pain,  location:neck                          Pain 4 / 10 (Scale 0 - 10)    Neuropathy:    No numbness, no tingling, no paresthesia   Sleep:              No difficulty falling asleep, no waking up in night, no daytime sleepiness, no snoring  Altered function:          No problems with money management,  no problems with daily organization & planning  Weight:           lost too much weight on gluten free diet       Labs:   No results found for: WBC, HGB, HCT, MCV, PLT        No results found for: HGBA1C   T3,T4,T7 and TSH   Vitamin d level  Vitamin b-12       Assessment:   Ovarian Cancer     Weight loss  Vaginal dryness  Plan   1. Nutrition: Continue to encourage plant based diet;  discussed anti-inflammatory diet as well as foods to decrease effects of diabetes and prediabetes.   2. Sleep: Recommend 6-8 hours of restful sleep nightly; recommend strong sleep hygiene routine one hour prior to bed. Discussed relaxation,  and guided imagery prior to bed.   3. Mind Body Medicine: Continue Deep breathing exercise, yoga, and focus on gratitude   4. Exercise: Recommend 60 minutes of gentle movement/light activity per day (cleaning, walking, cooking, gardening, stationary bike). Recommend some type of cardiovascular activity daily if well.   5. Recommend a positive thought process through  affirmations and visualizations.  6. Refer to Acupuncture Arthralgias and Neuropathy- she would like to wait to schedule    7Dietitian for Weight Loss.Improving overall diet  8..  Counseled her on the benefit of bio nourish Good clean Love products. Follow up affirm.   9.Follow-Up: for GYN [facility]     Total time 45 minutes- face-to-face, review of medical record and arranging follow-up

## 2023-07-07 NOTE — PROGRESS NOTES
Subjective     Patient ID: Najma Hung is a 67 y.o. female.    Chief Complaint: Advice Only    67 year old female resents to clinic for evaluation of recurrent ovarian cancer. Patient is a patient of Dr. Olivier who presents today for a second opinion.  She has recurrent serous ovarian carcinoma; initial stage IIIB high grade serous ovarian ca. She reports she is still currently taking lynparza 200 mg bid. She does report fatigue, nausea (alleviated with zofran), pelvic pain and intermittent R sided flank pain (reports improving), constipation.     Oncology History:  5/29/2020 Imaging Significant Finding   Pelvic US with multicystic enlarged ovaries bilaterally, largest 8cm, some with mural nodularity.    6/5/2020 Imaging Significant Finding   CT A/P with large (12.4 x 9.2 cm), complex, multi cystic mass in pelvis with mural nodularity and numerous septations. Numerous hypodensities in liver, favor hepatic cysts.    6/25/2020- Elap, BSO, radical cytoreduction, supracolic omentectomy, bilateral ureterolysis, staging biopsies, bilateral pelvic/para-aortic LAD, striping of pelvic/bladder peritoneum, and cystoscopy with Dr. Olivier. 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 Ovarian surface ruptured bilaterally. LVSI in right ovary. Metastatic disease in pelvic peritoneum, omentum, and 2/8 pelvic lymph nodes.    CARUS testing 7/25/20 specimen: HER2/Adrian negative (0), HRD negative, KRAS amplified, TP53 pathogenic variant, Microstellite stable, Mmr proficient, TMB low, BRCA1 and BRCA2 wildtype, ER negative, FOLR1 negative, PDL1 negative    -28. (Prior to chemo)      8/10/2020- 1/28/2021 chemotherapy.   8/20/2020- Course #1 of Carbo/Taxol   9/10/20- Course #2 Carbo/Taxotere  10/15/2020 - unable to tolerate combination chemo; changed to single agent carboplatin  2/10/2020 - dose reduce carboplatin to 90% - course #4 carboplatin  1/28/2021- dose reduce carboplatin  "to 80%; course #5 carboplatin (delayed due to GI issues)    9/8/2020- [facility] My Risk genetic testing, NEGATIVE    -90.6 (completion of chemo)     4/19/21- PET scan showed:  No evidence for FDG avid metastasis or recurrence.    5/5/21- Patient decided not to go on PARP    5/17/21- Neolane [facility]Choice CDx testing: POSITIVE    11/29/21-  = 70.7    12/15/21- PET scan showed:  No evidence for FDG avid metastasis or recurrence    2/22: -715     2/17/22- CT abdomen and pelvis showed:  1. Multiple subcentimeter hypodensities throughout the liver suspicious for multifocal hepatic metastatic disease.  **I reviewed the CT scan from [facility] dated 6/2020 and compared it to [facility] CT scan dated 2/2022 with radiologist Dr. Soares. There are virtually the same numerous hepatic cysts seen in 6/2020 - 5-7 cysts all under 10mm, each cyst measures within 1-2 mm compared to the cysts diameter on current scan. These are "stable" cysts over 1+ years suggesting likely benign nature. Also, PET scan from Dec 2021 showed no FDG avid lesions. Najma decided to postpone liver biopsy and repeat  in 1 month     continued to rise 177>241.3     5/25/2022:  continues to rise >200. Decided to proceed with re-treatment with single agent carboplatin. Course #1 carboplatin with neulasta    8/22 PET: No evidence of FDG met. disease. liver lesion not FDG avid.  Indeterminate foci of FDG in lower pelvis.      355 (8/2022) >1155 (11/22)     11/22 PET: Interval development of FDG avid pericapsular hepatic and splenic implants as well as FDG avid peritoneal and mesenteric soft tissue metastatic deposits.     12/2022- Doxil and Carbo.    4/23 PET- Interval positive response to treatment. Decrease and resolution of some of the mesenteric and peritoneal implants. Decreased FDG activity of hepatic pericapsular subcapsular metastatic disease. Resolution of previously seen. Splenic implant     4/23: -532     6/1/2023 Began " Lynparza 200mg BID     6/2023:-445     6/28/2023: - 513     7/23- PET: 1. Progression of disease with increased degree of hypermetabolic activity involving the known serosal implants as well as new serosal implant adjacent to the spleen.  2. Concern for new metastatic nodule within the middle lobe    Review of Systems   Constitutional:  Positive for fatigue. Negative for chills and fever.   HENT:  Negative for mouth sores.    Respiratory:  Negative for chest tightness and shortness of breath.    Cardiovascular:  Negative for chest pain.   Gastrointestinal:  Positive for constipation and nausea. Negative for abdominal distention and vomiting.   Genitourinary:  Positive for pelvic pain. Negative for dysuria, hematuria and vaginal bleeding.   Neurological:  Negative for syncope and weakness.        Objective   Wt 56.7 kg (125 lb)   BMI 20.18 kg/m²     Physical Exam  Vitals reviewed. Exam conducted with a chaperone present.   Constitutional:       Appearance: Normal appearance.   HENT:      Head: Normocephalic and atraumatic.   Eyes:      Extraocular Movements: Extraocular movements intact.      Conjunctiva/sclera: Conjunctivae normal.      Pupils: Pupils are equal, round, and reactive to light.   Pulmonary:      Effort: Pulmonary effort is normal. No respiratory distress.   Abdominal:      General: There is no distension.      Palpations: Abdomen is soft.      Tenderness: There is no abdominal tenderness.   Musculoskeletal:         General: Normal range of motion.   Skin:     General: Skin is warm and dry.   Neurological:      General: No focal deficit present.      Mental Status: She is alert and oriented to person, place, and time. Mental status is at baseline.   Psychiatric:         Mood and Affect: Mood normal.         Behavior: Behavior normal.         Thought Content: Thought content normal.         Judgment: Judgment normal.          Assessment and Plan     1. Encounter for antineoplastic  chemotherapy    2. Ovarian cancer  -     Ambulatory referral/consult to Gynecologic Oncology        Patients records and recent imaging reviewed. I had a long discussion with her and her  regarding her treatment history, rationale for at times postponing treatment, overall toleration of prior regimens, and recent experience with PARPi.  Her tumor is HRD positive, so she should respond to PARP given her platinum sensitive disease.  I feel that her PARP has not had adequate time to work, and her recent PET really just demonstrates expected progression of disease in the face of no treatment since April.  I recommend we stay on PARPi with repeat CA-125 at the end of July, and imaging in 4-6 weeks.  May be eligible for TORAY, and will also present at  to confirm that consensus would be to continue current regimen.  She and her  voiced understanding and agreement with the plan.  Will let me know about further f/u.         No follow-ups on file.

## 2023-07-08 LAB
BACTERIAL VAGINOSIS DNA: NEGATIVE
CANDIDA GLABRATA DNA: NEGATIVE
CANDIDA KRUSEI DNA: NEGATIVE
CANDIDA RRNA VAG QL PROBE: NEGATIVE
T VAGINALIS RRNA GENITAL QL PROBE: NEGATIVE

## 2023-07-10 ENCOUNTER — TELEPHONE (OUTPATIENT)
Dept: GYNECOLOGIC ONCOLOGY | Facility: CLINIC | Age: 68
End: 2023-07-10
Payer: MEDICARE

## 2023-07-10 DIAGNOSIS — C56.9 MALIGNANT NEOPLASM OF OVARY, UNSPECIFIED LATERALITY: Primary | ICD-10-CM

## 2023-07-10 NOTE — TELEPHONE ENCOUNTER
"----- Message from Randi Rocha sent at 7/10/2023  3:41 PM CDT -----  Regarding: lab orders  Name of Who is Calling:  YESSENIA MCDONALD [[id number]]        What is the request in detail:  Pt is calling bc dr Hodgson said that she needs to get orders put in for her labs. Please call when labs are in so she knows to schedule. She is also asking if the [facility] lab has "Coband". Please c/b asap to assist         Can the clinic reply by [facility]:          What Number to Call Back [facility] not [facility]:[phone]    "

## 2023-07-12 ENCOUNTER — LAB VISIT (OUTPATIENT)
Dept: LAB | Facility: OTHER | Age: 68
End: 2023-07-12
Attending: INTERNAL MEDICINE
Payer: MEDICARE

## 2023-07-12 ENCOUNTER — TUMOR BOARD CONFERENCE (OUTPATIENT)
Dept: GYNECOLOGIC ONCOLOGY | Facility: CLINIC | Age: 68
End: 2023-07-12
Payer: MEDICARE

## 2023-07-12 DIAGNOSIS — C56.9 MALIGNANT NEOPLASM OF OVARY, UNSPECIFIED LATERALITY: ICD-10-CM

## 2023-07-12 LAB
ALBUMIN SERPL BCP-MCNC: 4.1 G/DL (ref 3.5–5.2)
ALP SERPL-CCNC: 80 U/L (ref 55–135)
ALT SERPL W/O P-5'-P-CCNC: 16 U/L (ref 10–44)
ANION GAP SERPL CALC-SCNC: 9 MMOL/L (ref 8–16)
AST SERPL-CCNC: 23 U/L (ref 10–40)
BILIRUB SERPL-MCNC: 0.4 MG/DL (ref 0.1–1)
BUN SERPL-MCNC: 16 MG/DL (ref 8–23)
CALCIUM SERPL-MCNC: 9.7 MG/DL (ref 8.7–10.5)
CHLORIDE SERPL-SCNC: 101 MMOL/L (ref 95–110)
CO2 SERPL-SCNC: 27 MMOL/L (ref 23–29)
CREAT SERPL-MCNC: 1 MG/DL (ref 0.5–1.4)
ERYTHROCYTE [DISTWIDTH] IN BLOOD BY AUTOMATED COUNT: 15.1 % (ref 11.5–14.5)
EST. GFR  (NO RACE VARIABLE): >60 ML/MIN/1.73 M^2
GLUCOSE SERPL-MCNC: 91 MG/DL (ref 70–110)
HCT VFR BLD AUTO: 33.5 % (ref 37–48.5)
HGB BLD-MCNC: 11.4 G/DL (ref 12–16)
IMM GRANULOCYTES # BLD AUTO: 0.02 K/UL (ref 0–0.04)
MCH RBC QN AUTO: 32.4 PG (ref 27–31)
MCHC RBC AUTO-ENTMCNC: 34 G/DL (ref 32–36)
MCV RBC AUTO: 95 FL (ref 82–98)
NEUTROPHILS # BLD AUTO: 1.9 K/UL (ref 1.8–7.7)
PLATELET # BLD AUTO: 184 K/UL (ref 150–450)
PMV BLD AUTO: 8.7 FL (ref 9.2–12.9)
POTASSIUM SERPL-SCNC: 4.5 MMOL/L (ref 3.5–5.1)
PROT SERPL-MCNC: 7.3 G/DL (ref 6–8.4)
RBC # BLD AUTO: 3.52 M/UL (ref 4–5.4)
SODIUM SERPL-SCNC: 137 MMOL/L (ref 136–145)
WBC # BLD AUTO: 3.07 K/UL (ref 3.9–12.7)

## 2023-07-12 PROCEDURE — 85027 COMPLETE CBC AUTOMATED: CPT | Performed by: OBSTETRICS & GYNECOLOGY

## 2023-07-12 PROCEDURE — 80053 COMPREHEN METABOLIC PANEL: CPT | Performed by: OBSTETRICS & GYNECOLOGY

## 2023-07-12 PROCEDURE — 36415 COLL VENOUS BLD VENIPUNCTURE: CPT | Performed by: OBSTETRICS & GYNECOLOGY

## 2023-07-12 NOTE — PROGRESS NOTES
Multidisciplinary Gynecologic Oncology Cancer Conference - Evaluation and Recommendation Summary  Patient Name: Najma Hung  : 1955  MRN: [id number]     1. Evaluation  HPI: Najma Hung ([id number])     67 year old female. 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     -2020: Exlap, BSO, OMX, bilateral uterolysis, staging, bilateral pelvic/para-aortic LAD. ([facility]) Path consistent with invasive high grade pap serous ovarian CA.      -2020:CARUS- HER2 NEG. HRD NEG. MMR proficient, BRCA1/2 NEG. ER NEG FOLR1 NEG. PDL1 NEG.      MyRISK Negative.     -15. (Prior to chemo)     -2020-2021: Chemo. Received at [facility] with Trip.     C1:Carbo/Taxol.     C2: Carbo/Taxotere     C3-C6: couldnt tolerate taxane. Proceeded with single agent carbo for C3-6.     -05.6 (completion of chemo)     -UXFLIP CDx testing: POSITIVE.     21- PET scan [facility]     21-  = 70.7     12/15/21- PET scan [facility]     : -119     - CT abdomen and pelvis showed:  Multiple subcentimeter hypodensities throughout the liver suspicious for multifocal hepatic metastatic disease.     *PT decided to postpone liver biopsy and repeat  in 1 month      continued to rise 177>241.3     In 2022- Trip discussed going back to single agent carbo. Pt was not ready.       PET: No evidence of FDG met. disease. liver lesion not FDG avid.  Indeterminate foci of FDG in lower pelvis.      355 (2022) >1155 ()      PET: Interval development of FDG avid pericapsular hepatic and splenic implants as well as FDG avid peritoneal and mesenteric soft tissue metastatic deposits.     In 2022- returned to [facility]. Started Doxil/Carbo x 4.       PET- Interval positive response to treatment. Decrease and resolution of some of the mesenteric and peritoneal implants. Decreased FDG activity of hepatic pericapsular subcapsular metastatic disease.  Resolution of previously seen. Splenic implant     4/23: -595     6/1/2023 Began Lynparza 200mg BID     6/2023:-445     6/28/2023: - 513     7/23- PET:   1. Progression of disease with increased degree of hypermetabolic activity involving the known serosal implants as well as new serosal implant adjacent to the spleen.    2. Concern for new metastatic nodule within the middle lobe.         2. Treatment Recommendation    Consensus opinion is to continue Olaparib.

## 2023-07-19 ENCOUNTER — PATIENT MESSAGE (OUTPATIENT)
Dept: GYNECOLOGIC ONCOLOGY | Facility: CLINIC | Age: 68
End: 2023-07-19
Payer: MEDICARE

## 2023-07-20 DIAGNOSIS — C56.9 MALIGNANT NEOPLASM OF OVARY, UNSPECIFIED LATERALITY: Primary | ICD-10-CM

## 2023-07-20 DIAGNOSIS — C56.3 OVARIAN CANCER, BILATERAL: Primary | ICD-10-CM

## 2023-07-20 NOTE — TELEPHONE ENCOUNTER
----- Message from Too Cardozo sent at 7/20/2023  8:53 AM CDT -----   Name of Who is Calling:     What is the request in detail: patient request call back in reference to discuss message from [facility] / patient states she prefer to speak with nurse   Please contact to further discuss and advise      Can the clinic reply by [facility]:     What Number to Call Back if not in [facility]:  [phone]

## 2023-07-21 ENCOUNTER — SPECIALTY PHARMACY (OUTPATIENT)
Dept: PHARMACY | Facility: CLINIC | Age: 68
End: 2023-07-21
Payer: MEDICARE

## 2023-07-21 ENCOUNTER — HOSPITAL ENCOUNTER (INPATIENT)
Facility: OTHER | Age: 68
LOS: 1 days | Discharge: HOME | DRG: 755 | End: 2023-07-23
Attending: EMERGENCY MEDICINE | Admitting: OBSTETRICS & GYNECOLOGY
Payer: MEDICARE

## 2023-07-21 ENCOUNTER — TELEPHONE (OUTPATIENT)
Dept: PHARMACY | Facility: CLINIC | Age: 68
End: 2023-07-21
Payer: MEDICARE

## 2023-07-21 DIAGNOSIS — K59.09 OTHER CONSTIPATION: ICD-10-CM

## 2023-07-21 DIAGNOSIS — R10.9 ABDOMINAL PAIN: ICD-10-CM

## 2023-07-21 DIAGNOSIS — R11.2 NAUSEA AND VOMITING, UNSPECIFIED VOMITING TYPE: ICD-10-CM

## 2023-07-21 DIAGNOSIS — C56.9 MALIGNANT NEOPLASM OF OVARY, UNSPECIFIED LATERALITY: ICD-10-CM

## 2023-07-21 DIAGNOSIS — R10.30 LOWER ABDOMINAL PAIN: Primary | ICD-10-CM

## 2023-07-21 PROBLEM — N18.9 CHRONIC KIDNEY DISEASE (CKD): Status: ACTIVE | Noted: 2023-07-21

## 2023-07-21 LAB
ALBUMIN SERPL BCP-MCNC: 4.3 G/DL (ref 3.5–5.2)
ALP SERPL-CCNC: 85 U/L (ref 55–135)
ALT SERPL W/O P-5'-P-CCNC: 19 U/L (ref 10–44)
ANION GAP SERPL CALC-SCNC: 14 MMOL/L (ref 8–16)
AST SERPL-CCNC: 24 U/L (ref 10–40)
BACTERIA #/AREA URNS HPF: ABNORMAL /HPF
BASOPHILS # BLD AUTO: 0.02 K/UL (ref 0–0.2)
BASOPHILS NFR BLD: 0.3 % (ref 0–1.9)
BILIRUB SERPL-MCNC: 0.4 MG/DL (ref 0.1–1)
BILIRUB UR QL STRIP: NEGATIVE
BUN SERPL-MCNC: 18 MG/DL (ref 8–23)
CALCIUM SERPL-MCNC: 10 MG/DL (ref 8.7–10.5)
CHLORIDE SERPL-SCNC: 102 MMOL/L (ref 95–110)
CLARITY UR: CLEAR
CO2 SERPL-SCNC: 21 MMOL/L (ref 23–29)
COLOR UR: YELLOW
CREAT SERPL-MCNC: 0.9 MG/DL (ref 0.5–1.4)
DIFFERENTIAL METHOD: ABNORMAL
EOSINOPHIL # BLD AUTO: 0 K/UL (ref 0–0.5)
EOSINOPHIL NFR BLD: 0.4 % (ref 0–8)
ERYTHROCYTE [DISTWIDTH] IN BLOOD BY AUTOMATED COUNT: 16.2 % (ref 11.5–14.5)
EST. GFR  (NO RACE VARIABLE): >60 ML/MIN/1.73 M^2
GLUCOSE SERPL-MCNC: 143 MG/DL (ref 70–110)
GLUCOSE UR QL STRIP: NEGATIVE
HCT VFR BLD AUTO: 36.2 % (ref 37–48.5)
HGB BLD-MCNC: 12.2 G/DL (ref 12–16)
HGB UR QL STRIP: NEGATIVE
HYALINE CASTS #/AREA URNS LPF: 3 /LPF
IMM GRANULOCYTES # BLD AUTO: 0.03 K/UL (ref 0–0.04)
IMM GRANULOCYTES NFR BLD AUTO: 0.4 % (ref 0–0.5)
KETONES UR QL STRIP: ABNORMAL
LEUKOCYTE ESTERASE UR QL STRIP: ABNORMAL
LIPASE SERPL-CCNC: 14 U/L (ref 4–60)
LYMPHOCYTES # BLD AUTO: 0.6 K/UL (ref 1–4.8)
LYMPHOCYTES NFR BLD: 8.2 % (ref 18–48)
MAGNESIUM SERPL-MCNC: 2.1 MG/DL (ref 1.6–2.6)
MCH RBC QN AUTO: 31.6 PG (ref 27–31)
MCHC RBC AUTO-ENTMCNC: 33.7 G/DL (ref 32–36)
MCV RBC AUTO: 94 FL (ref 82–98)
MICROSCOPIC COMMENT: ABNORMAL
MONOCYTES # BLD AUTO: 0.5 K/UL (ref 0.3–1)
MONOCYTES NFR BLD: 5.8 % (ref 4–15)
NEUTROPHILS # BLD AUTO: 6.7 K/UL (ref 1.8–7.7)
NEUTROPHILS NFR BLD: 84.9 % (ref 38–73)
NITRITE UR QL STRIP: NEGATIVE
NRBC BLD-RTO: 0 /100 WBC
PH UR STRIP: 6 [PH] (ref 5–8)
PLATELET # BLD AUTO: 193 K/UL (ref 150–450)
PMV BLD AUTO: 8.6 FL (ref 9.2–12.9)
POTASSIUM SERPL-SCNC: 3.9 MMOL/L (ref 3.5–5.1)
PROT SERPL-MCNC: 7.6 G/DL (ref 6–8.4)
PROT UR QL STRIP: NEGATIVE
RBC # BLD AUTO: 3.86 M/UL (ref 4–5.4)
RBC #/AREA URNS HPF: 1 /HPF (ref 0–4)
SODIUM SERPL-SCNC: 137 MMOL/L (ref 136–145)
SP GR UR STRIP: 1.01 (ref 1–1.03)
SQUAMOUS #/AREA URNS HPF: 4 /HPF
URN SPEC COLLECT METH UR: ABNORMAL
UROBILINOGEN UR STRIP-ACNC: NEGATIVE EU/DL
WBC # BLD AUTO: 7.82 K/UL (ref 3.9–12.7)
WBC #/AREA URNS HPF: 5 /HPF (ref 0–5)

## 2023-07-21 PROCEDURE — G0378 HOSPITAL OBSERVATION PER HR: HCPCS

## 2023-07-21 PROCEDURE — 83690 ASSAY OF LIPASE: CPT | Performed by: EMERGENCY MEDICINE

## 2023-07-21 PROCEDURE — 63600175 PHARM REV CODE 636 W HCPCS

## 2023-07-21 PROCEDURE — 96375 TX/PRO/DX INJ NEW DRUG ADDON: CPT

## 2023-07-21 PROCEDURE — 81000 URINALYSIS NONAUTO W/SCOPE: CPT | Performed by: EMERGENCY MEDICINE

## 2023-07-21 PROCEDURE — 96365 THER/PROPH/DIAG IV INF INIT: CPT

## 2023-07-21 PROCEDURE — 96376 TX/PRO/DX INJ SAME DRUG ADON: CPT

## 2023-07-21 PROCEDURE — 63600175 PHARM REV CODE 636 W HCPCS: Performed by: EMERGENCY MEDICINE

## 2023-07-21 PROCEDURE — 80053 COMPREHEN METABOLIC PANEL: CPT | Performed by: EMERGENCY MEDICINE

## 2023-07-21 PROCEDURE — 83735 ASSAY OF MAGNESIUM: CPT | Performed by: EMERGENCY MEDICINE

## 2023-07-21 PROCEDURE — 25000003 PHARM REV CODE 250

## 2023-07-21 PROCEDURE — 94761 N-INVAS EAR/PLS OXIMETRY MLT: CPT

## 2023-07-21 PROCEDURE — 99285 EMERGENCY DEPT VISIT HI MDM: CPT | Mod: 25

## 2023-07-21 PROCEDURE — 96361 HYDRATE IV INFUSION ADD-ON: CPT

## 2023-07-21 PROCEDURE — 85025 COMPLETE CBC W/AUTO DIFF WBC: CPT | Performed by: EMERGENCY MEDICINE

## 2023-07-21 PROCEDURE — 25000003 PHARM REV CODE 250: Performed by: EMERGENCY MEDICINE

## 2023-07-21 PROCEDURE — 96372 THER/PROPH/DIAG INJ SC/IM: CPT

## 2023-07-21 RX ORDER — ACETAMINOPHEN 325 MG/1
650 TABLET ORAL EVERY 6 HOURS
Status: DISCONTINUED | OUTPATIENT
Start: 2023-07-21 | End: 2023-07-23 | Stop reason: HOSPADM

## 2023-07-21 RX ORDER — HYDRALAZINE HYDROCHLORIDE 20 MG/ML
10 INJECTION INTRAMUSCULAR; INTRAVENOUS EVERY 6 HOURS PRN
Status: DISCONTINUED | OUTPATIENT
Start: 2023-07-21 | End: 2023-07-23 | Stop reason: HOSPADM

## 2023-07-21 RX ORDER — OXYCODONE HYDROCHLORIDE 5 MG/1
5 TABLET ORAL EVERY 4 HOURS PRN
Status: DISCONTINUED | OUTPATIENT
Start: 2023-07-21 | End: 2023-07-21

## 2023-07-21 RX ORDER — HYDROMORPHONE HYDROCHLORIDE 1 MG/ML
1 INJECTION, SOLUTION INTRAMUSCULAR; INTRAVENOUS; SUBCUTANEOUS
Status: COMPLETED | OUTPATIENT
Start: 2023-07-21 | End: 2023-07-21

## 2023-07-21 RX ORDER — OXYCODONE HYDROCHLORIDE 5 MG/1
10 TABLET ORAL EVERY 4 HOURS PRN
Status: DISCONTINUED | OUTPATIENT
Start: 2023-07-21 | End: 2023-07-21

## 2023-07-21 RX ORDER — MORPHINE SULFATE 4 MG/ML
4 INJECTION, SOLUTION INTRAMUSCULAR; INTRAVENOUS
Status: COMPLETED | OUTPATIENT
Start: 2023-07-21 | End: 2023-07-21

## 2023-07-21 RX ORDER — ONDANSETRON 2 MG/ML
4 INJECTION INTRAMUSCULAR; INTRAVENOUS EVERY 6 HOURS
Status: DISCONTINUED | OUTPATIENT
Start: 2023-07-21 | End: 2023-07-21

## 2023-07-21 RX ORDER — PROCHLORPERAZINE EDISYLATE 5 MG/ML
5 INJECTION INTRAMUSCULAR; INTRAVENOUS EVERY 6 HOURS PRN
Status: DISCONTINUED | OUTPATIENT
Start: 2023-07-21 | End: 2023-07-22

## 2023-07-21 RX ORDER — ENOXAPARIN SODIUM 100 MG/ML
40 INJECTION SUBCUTANEOUS EVERY 24 HOURS
Status: DISCONTINUED | OUTPATIENT
Start: 2023-07-21 | End: 2023-07-23 | Stop reason: HOSPADM

## 2023-07-21 RX ORDER — SODIUM CHLORIDE, SODIUM LACTATE, POTASSIUM CHLORIDE, CALCIUM CHLORIDE 600; 310; 30; 20 MG/100ML; MG/100ML; MG/100ML; MG/100ML
INJECTION, SOLUTION INTRAVENOUS CONTINUOUS
Status: DISCONTINUED | OUTPATIENT
Start: 2023-07-21 | End: 2023-07-23 | Stop reason: HOSPADM

## 2023-07-21 RX ORDER — MORPHINE SULFATE 15 MG/1
15 TABLET ORAL EVERY 4 HOURS PRN
Status: DISCONTINUED | OUTPATIENT
Start: 2023-07-21 | End: 2023-07-23 | Stop reason: HOSPADM

## 2023-07-21 RX ORDER — SODIUM CHLORIDE 0.9 % (FLUSH) 0.9 %
10 SYRINGE (ML) INJECTION
Status: DISCONTINUED | OUTPATIENT
Start: 2023-07-21 | End: 2023-07-23 | Stop reason: HOSPADM

## 2023-07-21 RX ORDER — HYDROMORPHONE HYDROCHLORIDE 1 MG/ML
0.5 INJECTION, SOLUTION INTRAMUSCULAR; INTRAVENOUS; SUBCUTANEOUS
Status: COMPLETED | OUTPATIENT
Start: 2023-07-21 | End: 2023-07-21

## 2023-07-21 RX ORDER — ONDANSETRON 2 MG/ML
4 INJECTION INTRAMUSCULAR; INTRAVENOUS
Status: COMPLETED | OUTPATIENT
Start: 2023-07-21 | End: 2023-07-21

## 2023-07-21 RX ORDER — HYDROMORPHONE HYDROCHLORIDE 1 MG/ML
0.5 INJECTION, SOLUTION INTRAMUSCULAR; INTRAVENOUS; SUBCUTANEOUS
Status: DISCONTINUED | OUTPATIENT
Start: 2023-07-21 | End: 2023-07-23 | Stop reason: HOSPADM

## 2023-07-21 RX ORDER — ONDANSETRON 2 MG/ML
4 INJECTION INTRAMUSCULAR; INTRAVENOUS EVERY 6 HOURS
Status: DISCONTINUED | OUTPATIENT
Start: 2023-07-21 | End: 2023-07-23 | Stop reason: HOSPADM

## 2023-07-21 RX ORDER — OXYCODONE HYDROCHLORIDE 5 MG/1
10 TABLET ORAL EVERY 6 HOURS PRN
Status: DISCONTINUED | OUTPATIENT
Start: 2023-07-21 | End: 2023-07-21

## 2023-07-21 RX ORDER — PANTOPRAZOLE SODIUM 40 MG/1
40 TABLET, DELAYED RELEASE ORAL DAILY
Status: DISCONTINUED | OUTPATIENT
Start: 2023-07-21 | End: 2023-07-23 | Stop reason: HOSPADM

## 2023-07-21 RX ADMIN — SODIUM CHLORIDE, POTASSIUM CHLORIDE, SODIUM LACTATE AND CALCIUM CHLORIDE: 600; 310; 30; 20 INJECTION, SOLUTION INTRAVENOUS at 11:07

## 2023-07-21 RX ADMIN — ONDANSETRON 4 MG: 2 INJECTION INTRAMUSCULAR; INTRAVENOUS at 11:07

## 2023-07-21 RX ADMIN — HYDROMORPHONE HYDROCHLORIDE 1 MG: 1 INJECTION, SOLUTION INTRAMUSCULAR; INTRAVENOUS; SUBCUTANEOUS at 11:07

## 2023-07-21 RX ADMIN — ONDANSETRON 4 MG: 2 INJECTION INTRAMUSCULAR; INTRAVENOUS at 04:07

## 2023-07-21 RX ADMIN — SODIUM CHLORIDE 1000 ML: 9 INJECTION, SOLUTION INTRAVENOUS at 09:07

## 2023-07-21 RX ADMIN — ACETAMINOPHEN 650 MG: 325 TABLET, FILM COATED ORAL at 06:07

## 2023-07-21 RX ADMIN — ENOXAPARIN SODIUM 40 MG: 40 INJECTION SUBCUTANEOUS at 04:07

## 2023-07-21 RX ADMIN — ACETAMINOPHEN 650 MG: 325 TABLET, FILM COATED ORAL at 11:07

## 2023-07-21 RX ADMIN — MORPHINE SULFATE 15 MG: 15 TABLET ORAL at 09:07

## 2023-07-21 RX ADMIN — ONDANSETRON 4 MG: 2 INJECTION INTRAMUSCULAR; INTRAVENOUS at 09:07

## 2023-07-21 RX ADMIN — PROMETHAZINE HYDROCHLORIDE 12.5 MG: 25 INJECTION INTRAMUSCULAR; INTRAVENOUS at 11:07

## 2023-07-21 RX ADMIN — SODIUM CHLORIDE, POTASSIUM CHLORIDE, SODIUM LACTATE AND CALCIUM CHLORIDE: 600; 310; 30; 20 INJECTION, SOLUTION INTRAVENOUS at 04:07

## 2023-07-21 RX ADMIN — MORPHINE SULFATE 15 MG: 15 TABLET ORAL at 05:07

## 2023-07-21 RX ADMIN — PANTOPRAZOLE SODIUM 40 MG: 40 TABLET, DELAYED RELEASE ORAL at 02:07

## 2023-07-21 RX ADMIN — SODIUM CHLORIDE, POTASSIUM CHLORIDE, SODIUM LACTATE AND CALCIUM CHLORIDE: 600; 310; 30; 20 INJECTION, SOLUTION INTRAVENOUS at 02:07

## 2023-07-21 RX ADMIN — MORPHINE SULFATE 4 MG: 4 INJECTION, SOLUTION INTRAMUSCULAR; INTRAVENOUS at 09:07

## 2023-07-21 RX ADMIN — HYDROMORPHONE HYDROCHLORIDE 0.5 MG: 0.5 INJECTION, SOLUTION INTRAMUSCULAR; INTRAVENOUS; SUBCUTANEOUS at 10:07

## 2023-07-21 NOTE — SUBJECTIVE & OBJECTIVE
Oncology Treatment Plan:   Domingo WELCH    Oncology History:   5/29/2020 Imaging Significant Finding   Pelvic US with multicystic enlarged ovaries bilaterally, largest 8cm, some with mural nodularity.     6/5/2020 Imaging Significant Finding   CT A/P with large (12.4 x 9.2 cm), complex, multi cystic mass in pelvis with mural nodularity and numerous septations. Numerous hypodensities in liver, favor hepatic cysts.     6/25/2020- Elap, BSO, radical cytoreduction, supracolic omentectomy, bilateral ureterolysis, staging biopsies, bilateral pelvic/para-aortic LAD, striping of pelvic/bladder peritoneum, and cystoscopy with Dr. Olivier. 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 Ovarian surface ruptured bilaterally. LVSI in right ovary. Metastatic disease in pelvic peritoneum, omentum, and 2/8 pelvic lymph nodes.     CARUS testing 7/25/20 specimen: HER2/Adrian negative (0), HRD negative, KRAS amplified, TP53 pathogenic variant, Microstellite stable, Mmr proficient, TMB low, BRCA1 and BRCA2 wildtype, ER negative, FOLR1 negative, PDL1 negative     -34. (Prior to chemo)       8/10/2020- 1/28/2021 chemotherapy.   8/20/2020- Course #1 of Carbo/Taxol   9/10/20- Course #2 Carbo/Taxotere  10/15/2020 - unable to tolerate combination chemo; changed to single agent carboplatin  2/10/2020 - dose reduce carboplatin to 90% - course #4 carboplatin  1/28/2021- dose reduce carboplatin to 80%; course #5 carboplatin (delayed due to GI issues)     9/8/2020- [facility] My Risk genetic testing, NEGATIVE     -63.6 (completion of chemo)      4/19/21- PET scan showed:  No evidence for FDG avid metastasis or recurrence.     5/5/21- Patient decided not to go on PARP     5/17/21- [employer] [facility] CDx testing: POSITIVE     11/29/21-  = 70.7     12/15/21- PET scan showed:  No evidence for FDG avid metastasis or recurrence     2/22: -773      2/17/22- CT abdomen and pelvis showed:  1. Multiple  "subcentimeter hypodensities throughout the liver suspicious for multifocal hepatic metastatic disease.  **I reviewed the CT scan from [facility] dated 6/2020 and compared it to [facility] CT scan dated 2/2022 with radiologist Dr. Soares. There are virtually the same numerous hepatic cysts seen in 6/2020 - 5-7 cysts all under 10mm, each cyst measures within 1-2 mm compared to the cysts diameter on current scan. These are "stable" cysts over 1+ years suggesting likely benign nature. Also, PET scan from Dec 2021 showed no FDG avid lesions. Najma decided to postpone liver biopsy and repeat  in 1 month      continued to rise 177>241.3      5/25/2022:  continues to rise >200. Decided to proceed with re-treatment with single agent carboplatin. Course #1 carboplatin with neulasta     8/22 PET: No evidence of FDG met. disease. liver lesion not FDG avid.  Indeterminate foci of FDG in lower pelvis.       355 (8/2022) >1155 (11/22)      11/22 PET: Interval development of FDG avid pericapsular hepatic and splenic implants as well as FDG avid peritoneal and mesenteric soft tissue metastatic deposits.      12/2022- Doxil and Carbo.     4/23 PET- Interval positive response to treatment. Decrease and resolution of some of the mesenteric and peritoneal implants. Decreased FDG activity of hepatic pericapsular subcapsular metastatic disease. Resolution of previously seen. Splenic implant      4/23: -423      6/1/2023 Began Lynparza 200mg BID      6/2023:-445      6/28/2023: - 513      7/23- PET: 1. Progression of disease with increased degree of hypermetabolic activity involving the known serosal implants as well as new serosal implant adjacent to the spleen.  2. Concern for new metastatic nodule within the middle lobe       Past Medical History:   Diagnosis Date    CKD (chronic kidney disease)     IBS (irritable bowel syndrome)     OAB (overactive bladder)     Ovarian cancer 2020    stage iv metastatic "     Past Surgical History:   Procedure Laterality Date    HYSTERECTOMY      age 28    WISDOM TOOTH EXTRACTION       Family History    None       Tobacco Use    Smoking status: Never    Smokeless tobacco: Never   Substance and Sexual Activity    Alcohol use: Never    Drug use: Never    Sexual activity: Yes       (Not in a hospital admission)      Review of patient's allergies indicates:   Allergen Reactions    Adhesive Dermatitis and Swelling     Skin adhesive (skinaffix)    Iodinated contrast media Other (See Comments), Diarrhea and Shortness Of Breath    Iodine Other (See Comments)     FAINTING, renal issues, Bilateral flank spasms  IODINE IN CONTRAST    Chamomile flower Hives    Codeine     Dyclonine Hives    Mold Other (See Comments)    Pcn [penicillins]     Pennyroyal oil Other (See Comments)    Peppermint Other (See Comments)    Diphenhydramine hcl Rash     Sometimes rash       Review of Systems   Constitutional:  Negative for chills and fever.   HENT:  Negative for nasal congestion and mouth sores.    Eyes:  Negative for visual disturbance.   Respiratory:  Negative for shortness of breath.    Cardiovascular:  Negative for chest pain and leg swelling.   Gastrointestinal:  Positive for abdominal pain, bloating, constipation, nausea and vomiting. Negative for diarrhea.   Genitourinary:  Negative for dysuria and vaginal bleeding.   Musculoskeletal:  Negative for back pain.   Integumentary:  Negative for rash.   Neurological:  Negative for headaches.   Psychiatric/Behavioral:  Negative for depression.     Objective:     Vital Signs (Most Recent):  Temp: 98.8 °F (37.1 °C) (07/21/23 1505)  Pulse: 76 (07/21/23 1502)  Resp: 14 (07/21/23 1153)  BP: (!) 168/77 (07/21/23 1502)  SpO2: 99 % (07/21/23 1502) Vital Signs (24h Range):  Temp:  [97.5 °F (36.4 °C)-98.8 °F (37.1 °C)] 98.8 °F (37.1 °C)  Pulse:  [64-85] 76  Resp:  [14-20] 14  SpO2:  [95 %-99 %] 99 %  BP: (150-198)/(68-83) 168/77     Weight: 55.3 kg (122 lb)  Body  mass index is 19.69 kg/m².       Physical Exam:   Constitutional: She is oriented to person, place, and time. She appears well-developed and well-nourished.    HENT:   Head: Normocephalic and atraumatic.    Eyes: EOM are normal.     Cardiovascular:  Normal rate.             Pulmonary/Chest: Effort normal and breath sounds normal.        Abdominal: Soft. She exhibits distension. There is abdominal tenderness (mild generalized abdominal tenderness). There is no rebound and no guarding.             Musculoskeletal: Normal range of motion and moves all extremeties.       Neurological: She is alert and oriented to person, place, and time.    Skin: Skin is warm and dry.    Psychiatric: She has a normal mood and affect.        Laboratory:  CBC:   Recent Labs   Lab 07/21/23  0928   WBC 7.82   HGB 12.2   HCT 36.2*      , CMP:   Recent Labs   Lab 07/21/23  0928      K 3.9      CO2 21*   *   BUN 18   CREATININE 0.9   CALCIUM 10.0   PROT 7.6   ALBUMIN 4.3   BILITOT 0.4   ALKPHOS 85   AST 24   ALT 19   ANIONGAP 14   , and Urine Studies:   Recent Labs   Lab 07/21/23  1035   COLORU Yellow   APPEARANCEUA Clear   PHUR 6.0   SPECGRAV 1.015   PROTEINUA Negative   GLUCUA Negative   KETONESU 1+*   BILIRUBINUA Negative   OCCULTUA Negative   NITRITE Negative   UROBILINOGEN Negative   LEUKOCYTESUR 1+*   RBCUA 1   WBCUA 5   BACTERIA Rare   SQUAMEPITHEL 4   HYALINECASTS 3*       Diagnostic Results:  CT Abdomen Pelvis  Without Contrast  Order: [id number]  Status: Final result     Visible to patient: Yes (not seen)     Next appt: 07/26/2023 at 01:00 PM in Hematology and Oncology ([address])     0 Result Notes  Details    Reading Physician Reading Date Result Priority   Suman Krueger MD  [phone] 7/21/2023 STAT     Narrative & Impression  EXAMINATION:  CT ABDOMEN PELVIS WITHOUT CONTRAST     CLINICAL HISTORY:  Nausea/vomiting;Abdominal pain, acute, nonlocalized;     TECHNIQUE:  Low dose axial images,  sagittal and coronal reformations were obtained from the lung bases to the pubic symphysis.  Oral contrast was not administered.     COMPARISON:  06/03/2020     FINDINGS:  Limited evaluation lung bases show no concerning masses or nodules.     The liver is normal in size.  No solid mass noting lack of IV contrast.  Scattered hepatic cysts are unchanged from prior exam.  Gallbladder is grossly unremarkable.     The spleen, adrenal glands, and pancreas show no focal abnormality noting lack of IV contrast.     Bilateral kidneys are normal in size and position.  No hydronephrosis or nephrolithiasis.  Urinary bladder shows no focal wall thickening.  Uterus is absent.     There is diffuse small bowel dilatation and a few scattered gas fluid levels.  A definitive transition point is not identified.  Colon is decompressed and contains fecal contents.  There is no free fluid or free gas.  No concerning lymphadenopathy.     Moderate atherosclerosis without aneurysm.  No evidence of an acute fracture or destructive osseous lesion.     Impression:     Mild diffuse small bowel dilatation without an identifiable transition point.  A partial or low-grade obstruction may be considered.  There is distal gas and stool within the colon, which is not significantly dilated.        Electronically signed by: Suman Krueger MD  Date:                                            07/21/2023  Time:                                           10:51

## 2023-07-21 NOTE — TELEPHONE ENCOUNTER
Hello, this is Thien Pham, clinical pharmacist with [facility] that is part of your care team.  We have begun working on your prescription that your doctor has sent us. Our next steps include:     Working with your insurance company to obtain approval for your medication  Working with you to ensure your medication is affordable     We will be calling you along the way with updates on your medication but if you have any concerns or receive information that you would like to discuss please reach us at [phone].    Welcome call outcome: Left voicemail

## 2023-07-21 NOTE — HOSPITAL COURSE
07/21/2023 - Admitted to observation for pain control. Clear liquid diet. Anti-emetics scheduled.   07/22/2023 - NAEON. Pain improved overnight. One episode of emesis this morning with continued nausea. Will make NPO  07/23/2023 - NAEON. Pain near resolved, only requiring tylenol. Nausea improved. Hungry this AM. + Flatus

## 2023-07-21 NOTE — HPI
Patient is a 68 yo F with stage IIIB high grade serous ovarian cancer currently on lynparza who presents to the ED with 8/10 abdominal pain for several hours as well as three episodes of vomiting. The pain began early this morning and was associated with nausea. She tried taking Zofran without relief. When her pain did not improve, she decided to present to the ED. She has baseline mild abdominal pain but does not take chronic narcotics. She also reports baseline constipation however has had three bowel movements since presenting to the ED. Of note, patient is lactose intolerant but reports having a cream sauce with her fish pasta last night. Since this morning she has had three episodes of vomiting. She denies any fevers, chills, chest pain, or shortness of breath that is different from her baseline.     PMH is otherwise significant for IBS and CKD.

## 2023-07-21 NOTE — NURSING
Pt arrived to floor from ED. LR initiated at 100 mL/hr. Pain 5/10 to abdomen. Room air. Spouse at bedside.

## 2023-07-21 NOTE — ED NOTES
Patient requesting to speak with Gyn/ONC MD before being transferred upstairs. Dr. Strong notified and will be down shortly.

## 2023-07-21 NOTE — ED TRIAGE NOTES
Patient presents to ED with c/o lower abdominal pain worse on the left quadrant, with N/V starting this morning. She reports being constipated the last few days. Denies fever.

## 2023-07-21 NOTE — ASSESSMENT & PLAN NOTE
- two episodes of vomiting prior to presentation with one additional episode in ED  - nausea improved with zofran and phenergan  - CMP and Magnesium WNL  - antiemetics: scheduled zofran q6 and compazine prn  - diet: clear liquids + , will consider advancing tomorrow if nausea and vomiting are improved  - if nausea/vomiting worse, will make NPO for concerns of SBO  - AM CBC, BMP, and Phos ordered

## 2023-07-21 NOTE — TELEPHONE ENCOUNTER
Patient currently enrolled with PAP at [employer]. Gets medication filled through [employer]. Patient understands we will send script over there, as copay would be >$3000 to fill here, and there are no ovarian cancer grants open at the moment

## 2023-07-21 NOTE — ASSESSMENT & PLAN NOTE
- patient denies history of CKD  - Cr 0.9 on admission, seems to be at baseline  - will avoid nephrotoxic medications while admitted

## 2023-07-21 NOTE — ASSESSMENT & PLAN NOTE
- recurrent high grade serous ovarian cancer, see Onc history above  - currently on lynparza BID  - will hold while inpatient due to side effects of nausea/vomiting  - Lovenox 40 daily for DVT ppx

## 2023-07-21 NOTE — H&P
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 The pain began early this morning and was associated with nausea. She tried taking Zofran without relief. When her pain did not improve, she decided to present to the ED. She has baseline mild abdominal pain but does not take chronic narcotics. She also reports baseline constipation however has had three bowel movements since presenting to the ED. Of note, patient is lactose intolerant but reports having a cream sauce with her fish pasta last night. Since this morning she has had three episodes of vomiting. She denies any fevers, chills, chest pain, or shortness of breath that is different from her baseline.     PMH is otherwise significant for IBS and CKD.      Hospital Course:  No notes on file    Oncology Treatment Plan:   Lynparza BID    Oncology History:   5/29/2020 Imaging Significant Finding   Pelvic US with multicystic enlarged ovaries bilaterally, largest 8cm, some with mural nodularity.     6/5/2020 Imaging Significant Finding   CT A/P with large (12.4 x 9.2 cm), complex, multi cystic mass in pelvis with mural nodularity and numerous septations. Numerous hypodensities in liver, favor hepatic cysts.     6/25/2020- Elap, BSO, radical cytoreduction, supracolic omentectomy, bilateral ureterolysis, staging biopsies, bilateral pelvic/para-aortic LAD, striping of pelvic/bladder peritoneum, and cystoscopy with Dr. Olivier. Pathology showed:  Pathology c/w invasive high grade papillary serous ovarian  "carcinoma. Ovarian surface ruptured bilaterally. LVSI in right ovary. Metastatic disease in pelvic peritoneum, omentum, and 2/8 pelvic lymph nodes.     CARUS testing 7/25/20 specimen: HER2/Adrian negative (0), HRD negative, KRAS amplified, TP53 pathogenic variant, Microstellite stable, Mmr proficient, TMB low, BRCA1 and BRCA2 wildtype, ER negative, FOLR1 negative, PDL1 negative     -58. (Prior to chemo)       8/10/2020- 1/28/2021 chemotherapy.   8/20/2020- Course #1 of Carbo/Taxol   9/10/20- Course #2 Carbo/Taxotere  10/15/2020 - unable to tolerate combination chemo; changed to single agent carboplatin  2/10/2020 - dose reduce carboplatin to 90% - course #4 carboplatin  1/28/2021- dose reduce carboplatin to 80%; course #5 carboplatin (delayed due to GI issues)     9/8/2020- [facility] My Risk genetic testing, NEGATIVE     -88.6 (completion of chemo)      4/19/21- PET scan showed:  No evidence for FDG avid metastasis or recurrence.     5/5/21- Patient decided not to go on PARP     5/17/21- "[employer] CDx testing: POSITIVE     11/29/21-  = 70.7     12/15/21- PET scan showed:  No evidence for FDG avid metastasis or recurrence     2/22: -010      2/17/22- CT abdomen and pelvis showed:  1. Multiple subcentimeter hypodensities throughout the liver suspicious for multifocal hepatic metastatic disease.  **I reviewed the CT scan from [facility] dated 6/2020 and compared it to [facility] CT scan dated 2/2022 with radiologist Dr. Soares. There are virtually the same numerous hepatic cysts seen in 6/2020 - 5-7 cysts all under 10mm, each cyst measures within 1-2 mm compared to the cysts diameter on current scan. These are "stable" cysts over 1+ years suggesting likely benign nature. Also, PET scan from Dec 2021 showed no FDG avid lesions. Najma decided to postpone liver biopsy and repeat  in 1 month      continued to rise 177>241.3      5/25/2022:  continues to rise >200. Decided to proceed with " re-treatment with single agent carboplatin. Course #1 carboplatin with neulasta     8/22 PET: No evidence of FDG met. disease. liver lesion not FDG avid.  Indeterminate foci of FDG in lower pelvis.       355 (8/2022) >1155 (11/22)      11/22 PET: Interval development of FDG avid pericapsular hepatic and splenic implants as well as FDG avid peritoneal and mesenteric soft tissue metastatic deposits.      12/2022- Doxil and Carbo.     4/23 PET- Interval positive response to treatment. Decrease and resolution of some of the mesenteric and peritoneal implants. Decreased FDG activity of hepatic pericapsular subcapsular metastatic disease. Resolution of previously seen. Splenic implant      4/23: -163      6/1/2023 Began Lynparza 200mg BID      6/2023:-445      6/28/2023: - 513      7/23- PET: 1. Progression of disease with increased degree of hypermetabolic activity involving the known serosal implants as well as new serosal implant adjacent to the spleen.  2. Concern for new metastatic nodule within the middle lobe       Past Medical History:   Diagnosis Date    CKD (chronic kidney disease)     IBS (irritable bowel syndrome)     OAB (overactive bladder)     Ovarian cancer 2020    stage iv metastatic     Past Surgical History:   Procedure Laterality Date    HYSTERECTOMY      age 28    WISDOM TOOTH EXTRACTION       Family History    None       Tobacco Use    Smoking status: Never    Smokeless tobacco: Never   Substance and Sexual Activity    Alcohol use: Never    Drug use: Never    Sexual activity: Yes       (Not in a hospital admission)      Review of patient's allergies indicates:   Allergen Reactions    Adhesive Dermatitis and Swelling     Skin adhesive (skinaffix)    Iodinated contrast media Other (See Comments), Diarrhea and Shortness Of Breath    Iodine Other (See Comments)     FAINTING, renal issues, Bilateral flank spasms  IODINE IN CONTRAST    Chamomile flower Hives    Codeine      Dyclonine Hives    Mold Other (See Comments)    Pcn [penicillins]     Pennyroyal oil Other (See Comments)    Peppermint Other (See Comments)    Diphenhydramine hcl Rash     Sometimes rash       Review of Systems   Constitutional:  Negative for chills and fever.   HENT:  Negative for nasal congestion and mouth sores.    Eyes:  Negative for visual disturbance.   Respiratory:  Negative for shortness of breath.    Cardiovascular:  Negative for chest pain and leg swelling.   Gastrointestinal:  Positive for abdominal pain, bloating, constipation, nausea and vomiting. Negative for diarrhea.   Genitourinary:  Negative for dysuria and vaginal bleeding.   Musculoskeletal:  Negative for back pain.   Integumentary:  Negative for rash.   Neurological:  Negative for headaches.   Psychiatric/Behavioral:  Negative for depression.     Objective:     Vital Signs (Most Recent):  Temp: 98.8 °F (37.1 °C) (07/21/23 1505)  Pulse: 76 (07/21/23 1502)  Resp: 14 (07/21/23 1153)  BP: (!) 168/77 (07/21/23 1502)  SpO2: 99 % (07/21/23 1502) Vital Signs (24h Range):  Temp:  [97.5 °F (36.4 °C)-98.8 °F (37.1 °C)] 98.8 °F (37.1 °C)  Pulse:  [64-85] 76  Resp:  [14-20] 14  SpO2:  [95 %-99 %] 99 %  BP: (150-198)/(68-83) 168/77     Weight: 55.3 kg (122 lb)  Body mass index is 19.69 kg/m².       Physical Exam:   Constitutional: She is oriented to person, place, and time. She appears well-developed and well-nourished.    HENT:   Head: Normocephalic and atraumatic.    Eyes: EOM are normal.     Cardiovascular:  Normal rate.             Pulmonary/Chest: Effort normal and breath sounds normal.        Abdominal: Soft. She exhibits distension. There is abdominal tenderness (mild generalized abdominal tenderness). There is no rebound and no guarding.             Musculoskeletal: Normal range of motion and moves all extremeties.       Neurological: She is alert and oriented to person, place, and time.    Skin: Skin is warm and dry.    Psychiatric: She has  a normal mood and affect.        Laboratory:  CBC:   Recent Labs   Lab 07/21/23  0928   WBC 7.82   HGB 12.2   HCT 36.2*      , CMP:   Recent Labs   Lab 07/21/23  0928      K 3.9      CO2 21*   *   BUN 18   CREATININE 0.9   CALCIUM 10.0   PROT 7.6   ALBUMIN 4.3   BILITOT 0.4   ALKPHOS 85   AST 24   ALT 19   ANIONGAP 14   , and Urine Studies:   Recent Labs   Lab 07/21/23  1035   COLORU Yellow   APPEARANCEUA Clear   PHUR 6.0   SPECGRAV 1.015   PROTEINUA Negative   GLUCUA Negative   KETONESU 1+*   BILIRUBINUA Negative   OCCULTUA Negative   NITRITE Negative   UROBILINOGEN Negative   LEUKOCYTESUR 1+*   RBCUA 1   WBCUA 5   BACTERIA Rare   SQUAMEPITHEL 4   HYALINECASTS 3*       Diagnostic Results:  CT Abdomen Pelvis  Without Contrast  Order: [id number]   Status: Final result      Visible to patient: Yes (not seen)      Next appt: 07/26/2023 at 01:00 PM in Hematology and Oncology ([address])      0 Result Notes  Details    Reading Physician Reading Date Result Priority   Suman Krueger MD  [phone] 7/21/2023 STAT     Narrative & Impression  EXAMINATION:  CT ABDOMEN PELVIS WITHOUT CONTRAST     CLINICAL HISTORY:  Nausea/vomiting;Abdominal pain, acute, nonlocalized;     TECHNIQUE:  Low dose axial images, sagittal and coronal reformations were obtained from the lung bases to the pubic symphysis.  Oral contrast was not administered.     COMPARISON:  06/03/2020     FINDINGS:  Limited evaluation lung bases show no concerning masses or nodules.     The liver is normal in size.  No solid mass noting lack of IV contrast.  Scattered hepatic cysts are unchanged from prior exam.  Gallbladder is grossly unremarkable.     The spleen, adrenal glands, and pancreas show no focal abnormality noting lack of IV contrast.     Bilateral kidneys are normal in size and position.  No hydronephrosis or nephrolithiasis.  Urinary bladder shows no focal wall thickening.  Uterus is absent.     There is diffuse  small bowel dilatation and a few scattered gas fluid levels.  A definitive transition point is not identified.  Colon is decompressed and contains fecal contents.  There is no free fluid or free gas.  No concerning lymphadenopathy.     Moderate atherosclerosis without aneurysm.  No evidence of an acute fracture or destructive osseous lesion.     Impression:     Mild diffuse small bowel dilatation without an identifiable transition point.  A partial or low-grade obstruction may be considered.  There is distal gas and stool within the colon, which is not significantly dilated.        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  A partial or low-grade obstruction may be considered.  There is distal gas and stool within the colon, which is not significantly dilated.  - In ED, required 4 mg morphine and two doses of dilaudid with persistent pain  - Admitted to observation for pain control  - Pain regimen: scheduled tylenol q6, 15mg morphine q4 prn, dilaudid btp    Chronic kidney disease (CKD)  - patient denies history of CKD  - Cr 0.9 on admission, seems to be at baseline  - will avoid nephrotoxic medications while admitted    Nausea and vomiting  - two episodes of vomiting prior to presentation with one additional episode in ED  - nausea improved with zofran and phenergan  - CMP and Magnesium WNL  - antiemetics: scheduled zofran q6 and compazine prn  - diet: clear liquids + , will consider advancing  tomorrow if nausea and vomiting are improved  - if nausea/vomiting worse, will make NPO for concerns of SBO  - AM CBC, BMP, and Phos ordered    Ovarian cancer  - recurrent high grade serous ovarian cancer, see Onc history above  - currently on lynparza BID  - will hold while inpatient due to side effects of nausea/vomiting  - Lovenox 40 daily for DVT ppx        Cassy Cornejo MD  Gynecologic Oncology  [facility] - Emergency Dept

## 2023-07-21 NOTE — ED PROVIDER NOTES
"Encounter Date: 7/21/2023    SCRIBE #1 NOTE: I, Jordan Uriostegui, am scribing for, and in the presence of,  Monika Coburn MD.     History     Chief Complaint   Patient presents with    Abdominal Pain     C/o diffused abd pain, n/v that began this am. PMH Ovarian Ca on Oral chemo. Denies any recent changes to medication. -red tint. Stated last BM this am. Currently vomiting in triage. VSS      Time seen by provider: 9:07 AM    Najma Hung is a 67 y.o. female, with a PMHx of ovarian cancer, who presents to the ED with severe abdominal pain since 0400 this morning. The patient has been on oral chemotherapy treatment for the last two months and notes moderate abdominal pain at baseline. Today, patient describes her pain as "gut-wrenching" and is accompanied by vomiting, dry mouth, and excessive sweating. She denies any fevers or urinary issues.  Denies chest pain or shortness breath.  Patient notes her most recent chemotherapy dose was last night. Her oncologist is Dr. Jerrell Hodgson. This is the extent of the patient's complaints today in the Emergency Department.      The history is provided by the patient.   Review of patient's allergies indicates:   Allergen Reactions    Adhesive Dermatitis and Swelling     Skin adhesive (skinaffix)    Iodinated contrast media Other (See Comments), Diarrhea and Shortness Of Breath    Iodine Other (See Comments)     FAINTING, renal issues, Bilateral flank spasms  IODINE IN CONTRAST    Chamomile flower Hives    Codeine     Dyclonine Hives    Mold Other (See Comments)    Pcn [penicillins]     Pennyroyal oil Other (See Comments)    Peppermint Other (See Comments)    Diphenhydramine hcl Rash     Sometimes rash     Past Medical History:   Diagnosis Date    CKD (chronic kidney disease)     IBS (irritable bowel syndrome)     OAB (overactive bladder)     Ovarian cancer 2020    stage iv metastatic     Past Surgical History:   Procedure Laterality Date    HYSTERECTOMY      age 28    WISDOM " TOOTH EXTRACTION       Family History   Problem Relation Age of Onset    Vaginal cancer Neg Hx     Endometrial cancer Neg Hx     Cervical cancer Neg Hx     Breast cancer Neg Hx      Social History     Tobacco Use    Smoking status: Never    Smokeless tobacco: Never   Substance Use Topics    Alcohol use: Never    Drug use: Never     Review of Systems   Constitutional:  Positive for diaphoresis. Negative for chills and fever.   HENT:  Negative for congestion and rhinorrhea.    Respiratory:  Negative for chest tightness and shortness of breath.    Cardiovascular:  Negative for chest pain and palpitations.   Gastrointestinal:  Positive for abdominal pain, nausea and vomiting. Negative for diarrhea.   Genitourinary:  Negative for dysuria and flank pain.   Musculoskeletal:  Negative for back pain and neck pain.   Skin:  Negative for color change and wound.   Neurological:  Negative for dizziness and headaches.     Physical Exam     Initial Vitals [07/21/23 0838]   BP Pulse Resp Temp SpO2   (!) 198/83 85 18 97.5 °F (36.4 °C) 95 %      MAP       --         Physical Exam    Nursing note and vitals reviewed.  Constitutional: She appears well-developed and well-nourished.   HENT:   Head: Normocephalic and atraumatic.   Eyes: Conjunctivae are normal.   Neck: Neck supple.   Normal range of motion.  Cardiovascular:  Normal rate, regular rhythm and normal heart sounds.           Pulmonary/Chest: Breath sounds normal. No respiratory distress. She has no wheezes. She has no rales.   Abdominal: Abdomen is soft. Bowel sounds are normal. She exhibits distension. There is abdominal tenderness.   Mild distention.  Diffuse abdominal tenderness moreso in the lower abdomen.   There is no rebound.   Musculoskeletal:         General: Normal range of motion.      Cervical back: Normal range of motion and neck supple.     Neurological: She is alert and oriented to person, place, and time.   Ambulatory with steady gait   Skin: Skin is warm and  dry. Capillary refill takes less than 2 seconds.       ED Course   Procedures  Labs Reviewed   COMPREHENSIVE METABOLIC PANEL - Abnormal; Notable for the following components:       Result Value    CO2 21 (*)     Glucose 143 (*)     All other components within normal limits   CBC W/ AUTO DIFFERENTIAL - Abnormal; Notable for the following components:    RBC 3.86 (*)     Hematocrit 36.2 (*)     MCH 31.6 (*)     RDW 16.2 (*)     MPV 8.6 (*)     Lymph # 0.6 (*)     Gran % 84.9 (*)     Lymph % 8.2 (*)     All other components within normal limits   URINALYSIS - Abnormal; Notable for the following components:    Ketones, UA 1+ (*)     Leukocytes, UA 1+ (*)     All other components within normal limits   URINALYSIS MICROSCOPIC - Abnormal; Notable for the following components:    Hyaline Casts, UA 3 (*)     All other components within normal limits   LIPASE   MAGNESIUM          Imaging Results              CT Abdomen Pelvis  Without Contrast (Final result)  Result time 07/21/23 10:51:45      Final result by Suman Krueger MD (07/21/23 10:51:45)                   Impression:      Mild diffuse small bowel dilatation without an identifiable transition point.  A partial or low-grade obstruction may be considered.  There is distal gas and stool within the colon, which is not significantly dilated.      Electronically signed by: Suman Krueger MD  Date:    07/21/2023  Time:    10:51               Narrative:    EXAMINATION:  CT ABDOMEN PELVIS WITHOUT CONTRAST    CLINICAL HISTORY:  Nausea/vomiting;Abdominal pain, acute, nonlocalized;    TECHNIQUE:  Low dose axial images, sagittal and coronal reformations were obtained from the lung bases to the pubic symphysis.  Oral contrast was not administered.    COMPARISON:  06/03/2020    FINDINGS:  Limited evaluation lung bases show no concerning masses or nodules.    The liver is normal in size.  No solid mass noting lack of IV contrast.  Scattered hepatic cysts are unchanged from  prior exam.  Gallbladder is grossly unremarkable.    The spleen, adrenal glands, and pancreas show no focal abnormality noting lack of IV contrast.    Bilateral kidneys are normal in size and position.  No hydronephrosis or nephrolithiasis.  Urinary bladder shows no focal wall thickening.  Uterus is absent.    There is diffuse small bowel dilatation and a few scattered gas fluid levels.  A definitive transition point is not identified.  Colon is decompressed and contains fecal contents.  There is no free fluid or free gas.  No concerning lymphadenopathy.    Moderate atherosclerosis without aneurysm.  No evidence of an acute fracture or destructive osseous lesion.                                       Medications   olaparib Tab 200 mg (has no administration in time range)   sodium chloride 0.9% flush 10 mL (has no administration in time range)   enoxaparin injection 40 mg (has no administration in time range)   acetaminophen tablet 650 mg (has no administration in time range)   prochlorperazine injection Soln 5 mg (has no administration in time range)   HYDROmorphone injection 0.5 mg (has no administration in time range)   lactated ringers infusion (has no administration in time range)   pantoprazole EC tablet 40 mg (has no administration in time range)   ondansetron injection 4 mg (has no administration in time range)   oxyCODONE immediate release tablet 5 mg (has no administration in time range)   oxyCODONE immediate release tablet 10 mg (has no administration in time range)   sodium chloride 0.9% bolus 1,000 mL 1,000 mL (0 mLs Intravenous Stopped 7/21/23 1029)   ondansetron injection 4 mg (4 mg Intravenous Given 7/21/23 0928)   morphine injection 4 mg (4 mg Intravenous Given 7/21/23 0928)   HYDROmorphone injection 0.5 mg (0.5 mg Intravenous Given 7/21/23 1015)   promethazine (PHENERGAN) 12.5 mg in dextrose 5 % (D5W) 50 mL IVPB (0 mg Intravenous Stopped 7/21/23 1152)   HYDROmorphone injection 1 mg (1 mg Intravenous  Given 7/21/23 7942)     Medical Decision Making:   History:   Old Medical Records: I decided to obtain old medical records.  Old Records Summarized: other records, records from clinic visits and records from another hospital.  Initial Assessment:   9:07AM:  Patient is a 67-year-old female who presents to the emergency department with abdominal pain and nausea/vomiting.  Patient appears uncomfortable, though nontoxic.  Her abdomen is soft though she is tender.  Will plan for labs, imaging, will continue to follow and reassess.  Clinical Tests:   Lab Tests: Ordered and Reviewed  Radiological Study: Ordered and Reviewed  Medical Tests: Ordered and Reviewed  Other:   I have discussed this case with another health care provider.     11:15AM:  Patient's CT shows dilation of the small bowel with no clear transition point.  She continues to be nauseated with abdominal pain.  Her labs are otherwise unremarkable.  I discussed the patient with gynonc, who will come evaluate the patient.  Will continue to follow.    1:40PM:  Patient's nausea is better but continues to have abdominal pain.  I discussed again with gyn/onc, will plan to admit her for further observation and management.  Patient agreeable to plan and all questions answered.       Scribe Attestation:   Scribe #1: I performed the above scribed service and the documentation accurately describes the services I performed. I attest to the accuracy of the note.  Physician Attestation for Scribe: I, Monika Coburn, reviewed documentation as scribed in my presence, which is both accurate and complete.                    Clinical Impression:   Final diagnoses:  [R10.9] Abdominal pain  [R10.30] Lower abdominal pain (Primary)        ED Disposition Condition    Observation                 Monika Coburn MD  07/21/23 0496

## 2023-07-21 NOTE — ASSESSMENT & PLAN NOTE
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  A partial or low-grade obstruction may be considered.  There is distal gas and stool within the colon, which is not significantly dilated.  - In ED, required 4 mg morphine and two doses of dilaudid with persistent pain  - Admitted to observation for pain control  - Pain regimen: scheduled tylenol q6, 15mg morphine q4 prn, dilaudid btp

## 2023-07-21 NOTE — CONSULTS
[facility] - Emergency Dept  Gynecologic Oncology  Consult Note    Patient Name: Najma Hung  MRN: [id number]  Admission Date: 7/21/2023  Hospital Length of Stay: 0 days  Attending Provider: Monika Coburn MD  Primary Care Provider: Neymar Cash MD  Principal Problem: <principal problem not specified>     Inpatient consult to Gynecologic Oncology  Consult performed by: Cassy Cornejo MD  Consult ordered by: Monika Coburn MD      See H&P for further details dated 7/21/23. In short, patient is a 66 yo female with recurrent high grade serous ovarian cancer who presented with abdominal pain, nausea and vomiting. Will admit for pain control.     Cassy Cornejo MD  Gynecologic Oncology  [facility] - Emergency Dept

## 2023-07-22 PROBLEM — I10 HYPERTENSION: Status: ACTIVE | Noted: 2023-07-22

## 2023-07-22 PROBLEM — K76.6 PORTAL HYPERTENSION: Status: ACTIVE | Noted: 2023-07-22

## 2023-07-22 PROBLEM — K59.00 CONSTIPATION: Status: ACTIVE | Noted: 2023-07-22

## 2023-07-22 LAB
ANION GAP SERPL CALC-SCNC: 4 MMOL/L (ref 8–16)
BASOPHILS # BLD AUTO: 0.02 K/UL (ref 0–0.2)
BASOPHILS NFR BLD: 0.7 % (ref 0–1.9)
BUN SERPL-MCNC: 12 MG/DL (ref 8–23)
CALCIUM SERPL-MCNC: 8.7 MG/DL (ref 8.7–10.5)
CHLORIDE SERPL-SCNC: 109 MMOL/L (ref 95–110)
CO2 SERPL-SCNC: 28 MMOL/L (ref 23–29)
CREAT SERPL-MCNC: 0.8 MG/DL (ref 0.5–1.4)
DIFFERENTIAL METHOD: ABNORMAL
EOSINOPHIL # BLD AUTO: 0.1 K/UL (ref 0–0.5)
EOSINOPHIL NFR BLD: 5.2 % (ref 0–8)
ERYTHROCYTE [DISTWIDTH] IN BLOOD BY AUTOMATED COUNT: 16.7 % (ref 11.5–14.5)
EST. GFR  (NO RACE VARIABLE): >60 ML/MIN/1.73 M^2
GLUCOSE SERPL-MCNC: 86 MG/DL (ref 70–110)
HCT VFR BLD AUTO: 28.4 % (ref 37–48.5)
HGB BLD-MCNC: 9.6 G/DL (ref 12–16)
IMM GRANULOCYTES # BLD AUTO: 0.01 K/UL (ref 0–0.04)
IMM GRANULOCYTES NFR BLD AUTO: 0.4 % (ref 0–0.5)
LYMPHOCYTES # BLD AUTO: 0.8 K/UL (ref 1–4.8)
LYMPHOCYTES NFR BLD: 28.1 % (ref 18–48)
MCH RBC QN AUTO: 32 PG (ref 27–31)
MCHC RBC AUTO-ENTMCNC: 33.8 G/DL (ref 32–36)
MCV RBC AUTO: 95 FL (ref 82–98)
MONOCYTES # BLD AUTO: 0.3 K/UL (ref 0.3–1)
MONOCYTES NFR BLD: 11.9 % (ref 4–15)
NEUTROPHILS # BLD AUTO: 1.5 K/UL (ref 1.8–7.7)
NEUTROPHILS NFR BLD: 53.7 % (ref 38–73)
NRBC BLD-RTO: 0 /100 WBC
PHOSPHATE SERPL-MCNC: 3.6 MG/DL (ref 2.7–4.5)
PLATELET # BLD AUTO: 176 K/UL (ref 150–450)
PMV BLD AUTO: 8.7 FL (ref 9.2–12.9)
POTASSIUM SERPL-SCNC: 4.2 MMOL/L (ref 3.5–5.1)
RBC # BLD AUTO: 3 M/UL (ref 4–5.4)
SODIUM SERPL-SCNC: 141 MMOL/L (ref 136–145)
WBC # BLD AUTO: 2.7 K/UL (ref 3.9–12.7)

## 2023-07-22 PROCEDURE — 84100 ASSAY OF PHOSPHORUS: CPT

## 2023-07-22 PROCEDURE — 11000001 HC ACUTE MED/SURG PRIVATE ROOM

## 2023-07-22 PROCEDURE — 94761 N-INVAS EAR/PLS OXIMETRY MLT: CPT

## 2023-07-22 PROCEDURE — 80048 BASIC METABOLIC PNL TOTAL CA: CPT

## 2023-07-22 PROCEDURE — 85025 COMPLETE CBC W/AUTO DIFF WBC: CPT

## 2023-07-22 PROCEDURE — 63600175 PHARM REV CODE 636 W HCPCS

## 2023-07-22 PROCEDURE — 25000003 PHARM REV CODE 250

## 2023-07-22 PROCEDURE — 99233 PR SUBSEQUENT HOSPITAL CARE,LEVL III: ICD-10-PCS | Mod: ,,, | Performed by: OBSTETRICS & GYNECOLOGY

## 2023-07-22 PROCEDURE — 36415 COLL VENOUS BLD VENIPUNCTURE: CPT

## 2023-07-22 PROCEDURE — 99233 SBSQ HOSP IP/OBS HIGH 50: CPT | Mod: ,,, | Performed by: OBSTETRICS & GYNECOLOGY

## 2023-07-22 RX ORDER — IBUPROFEN 600 MG/1
600 TABLET ORAL EVERY 6 HOURS
Status: DISCONTINUED | OUTPATIENT
Start: 2023-07-22 | End: 2023-07-22

## 2023-07-22 RX ADMIN — ACETAMINOPHEN 650 MG: 325 TABLET, FILM COATED ORAL at 12:07

## 2023-07-22 RX ADMIN — ONDANSETRON 4 MG: 2 INJECTION INTRAMUSCULAR; INTRAVENOUS at 05:07

## 2023-07-22 RX ADMIN — ONDANSETRON 4 MG: 2 INJECTION INTRAMUSCULAR; INTRAVENOUS at 12:07

## 2023-07-22 RX ADMIN — ENOXAPARIN SODIUM 40 MG: 40 INJECTION SUBCUTANEOUS at 05:07

## 2023-07-22 RX ADMIN — HYDRALAZINE HYDROCHLORIDE 10 MG: 20 INJECTION INTRAMUSCULAR; INTRAVENOUS at 12:07

## 2023-07-22 RX ADMIN — PROMETHAZINE HYDROCHLORIDE 12.5 MG: 25 INJECTION INTRAMUSCULAR; INTRAVENOUS at 10:07

## 2023-07-22 RX ADMIN — HYDRALAZINE HYDROCHLORIDE 10 MG: 20 INJECTION INTRAMUSCULAR; INTRAVENOUS at 08:07

## 2023-07-22 RX ADMIN — PROCHLORPERAZINE EDISYLATE 5 MG: 5 INJECTION INTRAMUSCULAR; INTRAVENOUS at 07:07

## 2023-07-22 RX ADMIN — PROMETHAZINE HYDROCHLORIDE 12.5 MG: 25 INJECTION INTRAMUSCULAR; INTRAVENOUS at 05:07

## 2023-07-22 RX ADMIN — ACETAMINOPHEN 650 MG: 325 TABLET, FILM COATED ORAL at 05:07

## 2023-07-22 RX ADMIN — ACETAMINOPHEN 650 MG: 325 TABLET, FILM COATED ORAL at 11:07

## 2023-07-22 RX ADMIN — ONDANSETRON 4 MG: 2 INJECTION INTRAMUSCULAR; INTRAVENOUS at 11:07

## 2023-07-22 RX ADMIN — MORPHINE SULFATE 15 MG: 15 TABLET ORAL at 01:07

## 2023-07-22 RX ADMIN — MORPHINE SULFATE 15 MG: 15 TABLET ORAL at 05:07

## 2023-07-22 NOTE — PROGRESS NOTES
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 The pain began early this morning and was associated with nausea. She tried taking Zofran without relief. When her pain did not improve, she decided to present to the ED. She has baseline mild abdominal pain but does not take chronic narcotics. She also reports baseline constipation however has had three bowel movements since presenting to the ED. Of note, patient is lactose intolerant but reports having a cream sauce with her fish pasta last night. Since this morning she has had three episodes of vomiting. She denies any fevers, chills, chest pain, or shortness of breath that is different from her baseline.     PMH is otherwise significant for IBS and CKD.      Hospital Course:  07/21/2023 - Admitted to observation for pain control. Clear liquid diet. Anti-emetics scheduled.   07/22/2023 - NAEON. Pain improved overnight. One episode of emesis this morning with continued nausea. Will make NPO      Interval History: NAEON. Reports pain has improved this morning. Reports constant nausea with one episode of vomiting this morning. Voiding and ambulating spontaneously. Denies passing gas or having bowel movement since admission.     Scheduled Meds:   acetaminophen  650 mg Oral Q6H    enoxparin  40 mg Subcutaneous Daily    ondansetron  4 mg Intravenous Q6H    pantoprazole  40 mg Oral Daily     Continuous Infusions:    lactated ringers 100 mL/hr at 07/21/23 2310     PRN Meds:hydrALAZINE, HYDROmorphone, morphine, promethazine (PHENERGAN) IVPB, sodium chloride 0.9%    Review of patient's allergies indicates:   Allergen Reactions    Adhesive Dermatitis and Swelling     Skin adhesive (skinaffix)    Iodinated contrast media Other (See Comments), Diarrhea and Shortness Of Breath    Iodine Other (See Comments)     FAINTING, renal issues, Bilateral flank spasms  IODINE IN CONTRAST    Chamomile flower Hives    Codeine     Dyclonine Hives    Mold Other (See Comments)    Pcn [penicillins]     Pennyroyal oil Other (See Comments)    Peppermint Other (See Comments)    Diphenhydramine hcl Rash     Sometimes rash       Objective:     Vital Signs (Most Recent):  Temp: 98.6 °F (37 °C) (07/22/23 0737)  Pulse: 67 (07/22/23 0737)  Resp: 16 (07/22/23 0737)  BP: (!) 189/80 (07/22/23 0737)  SpO2: (!) 94 % (07/22/23 0906) Vital Signs (24h Range):  Temp:  [97.8 °F (36.6 °C)-99 °F (37.2 °C)] 98.6 °F (37 °C)  Pulse:  [67-79] 67  Resp:  [14-20] 16  SpO2:  [94 %-99 %] 94 %  BP: (152-189)/(67-80) 189/80     Weight: 55.5 kg (122 lb 5.7 oz)  Body mass index is 19.75 kg/m².    Intake/Output - Last 3 Shifts         07/20 0700 07/21 0659 07/21 0700 07/22 0659 07/22 0700 07/23 0659    Urine (mL/kg/hr)  200     Total Output  200     Net  -200                         Physical Exam:   Constitutional: She is oriented to person, place, and time.       Cardiovascular:  Normal rate.             Pulmonary/Chest: Effort normal. No respiratory distress.        Abdominal: Soft. She exhibits distension. Bowel sounds are decreased. There is no abdominal tenderness. There is no rebound and no guarding.             Musculoskeletal: Normal range of motion.       Neurological: She is alert and oriented to person, place, and time.    Skin: Skin is warm and dry.    Psychiatric: She has a normal mood and affect.        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  A partial or low-grade obstruction may be considered.  There is distal gas and stool within the colon, which is not significantly dilated.  - In ED, required 4 mg morphine and two doses of dilaudid with persistent pain  - Admitted to observation for pain control  - Reports pain is improved this morning  - Discussed adding scheduled ibuprofen alternating with tylenol as creatinine is normal but patient reports it upsets her stomach  - Discussed trying to limit narcotics if able to help with constipation  - Pain regimen: scheduled tylenol q6, 15mg morphine q4 prn, dilaudid btp    Constipation  - Discussed initiation of constipation medications however patient reports her stomach is sensitive to most medications  - Will hold off for now and monitor bowel function  - Discussed limiting narcotics if possible    Hypertension  - Patient denies past history of HTN  - Elevated BP overnight  - Hydral PRN ordered    Chronic kidney disease (CKD)  - patient denies history of CKD, reports that she had acute episode of decreased kidney function after CT with contrast in past  - Cr 0.9 on admission, seems  to be at baseline  - will avoid nephrotoxic medications while admitted    Nausea and vomiting  - two episodes of vomiting prior to presentation with one additional episode in ED  - nausea improved with zofran and phenergan  - CMP and Magnesium WNL  - Hypoactive bowel sounds on exam, abdomen still slightly distended  - antiemetics: scheduled zofran q6 and compazine prn, will switch compazine to PRN phenergan  - diet: will make NPO due to persistent nausea, with ice chips  - AM CBC, BMP ordered    Ovarian cancer  - recurrent high grade serous ovarian cancer, see Onc history above  - currently on lynparza BID  - will hold while inpatient due to side effects of nausea/vomiting  - Lovenox 40 daily for DVT ppx  - Palliative consulted for goals of care/symptom management      VTE Risk Mitigation (From admission, onward)           Ordered     enoxaparin injection 40 mg  Daily         07/21/23 1351     IP VTE HIGH RISK PATIENT  Once         07/21/23 1351     Place sequential compression device  Until discontinued         07/21/23 1351                    Najma Strong MD  Gynecologic Oncology  [facility] ([address])

## 2023-07-22 NOTE — ASSESSMENT & PLAN NOTE
- Discussed initiation of constipation medications however patient reports her stomach is sensitive to most medications  - Will hold off for now and monitor bowel function  - Discussed limiting narcotics if possible

## 2023-07-22 NOTE — PLAN OF CARE
07/22/23 0832   TOLLIVER Message   Medicare Outpatient and Observation Notification regarding financial responsibility Given to patient/caregiver;Explained to patient/caregiver;Signed/date by patient/caregiver   Date TOLLIVER was signed 07/22/23   Time TOLLIVER was signed 0830

## 2023-07-22 NOTE — PLAN OF CARE
SW called patient over the phone to discuss discharge.     Patient alert and oriented x3 with no communication barriers.    Patient confirmed address and PCP correct on facesheet.    Patient denies HH or DME use.    Patient family/friend to provide ride home at discharge.     CM to continue to follow.    07/22/23 1129   Discharge Assessment   Assessment Type Discharge Planning Assessment   Confirmed/corrected address, phone number and insurance Yes   Confirmed Demographics Correct on Facesheet   Source of Information patient   Communicated BRYANNA with patient/caregiver Date not available/Unable to determine   People in Home spouse   Do you expect to return to your current living situation? Yes   Prior to hospitilization cognitive status: Alert/Oriented   Current cognitive status: Alert/Oriented   Equipment Currently Used at Home none   Readmission within 30 days? No   Patient currently being followed by outpatient case management? No   Do you currently have service(s) that help you manage your care at home? No   Do you take prescription medications? Yes   Do you have any problems affording any of your prescribed medications? No   Is the patient taking medications as prescribed? yes   How do you get to doctors appointments? car, drives self   Are you on dialysis? No   Do you take coumadin? No   Discharge Plan A Home with family   DME Needed Upon Discharge  none   Discharge Plan discussed with: Patient   Transition of Care Barriers None   Physical Activity   On average, how many days per week do you engage in moderate to strenuous exercise (like a brisk walk)? 0 days   On average, how many minutes do you engage in exercise at this level? 0 min   Financial Resource Strain   How hard is it for you to pay for the very basics like food, housing, medical care, and heating? Not hard   Housing Stability   In the last 12 months, was there a time when you were not able to pay the mortgage or rent on time? N   In the last 12  months, how many places have you lived? 1   In the last 12 months, was there a time when you did not have a steady place to sleep or slept in a shelter (including now)? N   Transportation Needs   In the past 12 months, has lack of transportation kept you from medical appointments or from getting medications? no   In the past 12 months, has lack of transportation kept you from meetings, work, or from getting things needed for daily living? No   Food Insecurity   Within the past 12 months, you worried that your food would run out before you got the money to buy more. Never true   Within the past 12 months, the food you bought just didn't last and you didn't have money to get more. Never true   Stress   Do you feel stress - tense, restless, nervous, or anxious, or unable to sleep at night because your mind is troubled all the time - these days? Rather much   Social Connections   In a typical week, how many times do you talk on the phone with family, friends, or neighbors? More than 3   How often do you get together with friends or relatives? Three times   How often do you attend Roman Catholic or Mormon services? Never   Do you belong to any clubs or organizations such as Roman Catholic groups, unions, fraternal or athletic groups, or school groups? No   How often do you attend meetings of the clubs or organizations you belong to? Never   Are you , , , , never , or living with a partner?    Alcohol Use   Q1: How often do you have a drink containing alcohol? Never   Q2: How many drinks containing alcohol do you have on a typical day when you are drinking? None   Q3: How often do you have six or more drinks on one occasion? Never     [facility] [address])  Initial Discharge Assessment       Primary Care Provider: Neymar Cash MD    Admission Diagnosis: Lower abdominal pain [R10.30]  Abdominal pain [R10.9]  Malignant neoplasm of ovary, unspecified laterality  [C56.9]    Admission Date: 7/21/2023  Expected Discharge Date:     Transition of Care Barriers: (P) None    Payor: MEDICARE / Plan: MEDICARE PART A & B / Product Type: Government /     Extended Emergency Contact Information  Primary Emergency Contact: No, Contact  Relation: None    Discharge Plan A: (P) Home with family         [id number] - [place], [place] - [address]  [address]  [place] [address]  Phone: [phone] Fax: [phone]    [facility]  [address]  [place] [address]  Phone: [phone] Fax: [phone]      Initial Assessment (most recent)       Adult Discharge Assessment - 07/22/23 1129          Discharge Assessment    Assessment Type Discharge Planning Assessment (P)      Confirmed/corrected address, phone number and insurance Yes (P)      Confirmed Demographics Correct on Facesheet (P)      Source of Information patient (P)      Communicated BRYANNA with patient/caregiver Date not available/Unable to determine (P)      People in Home spouse (P)      Do you expect to return to your current living situation? Yes (P)      Prior to hospitilization cognitive status: Alert/Oriented (P)      Current cognitive status: Alert/Oriented (P)      Equipment Currently Used at Home none (P)      Readmission within 30 days? No (P)      Patient currently being followed by outpatient case management? No (P)      Do you currently have service(s) that help you manage your care at home? No (P)      Do you take prescription medications? Yes (P)      Do you have any problems affording any of your prescribed medications? No (P)      Is the patient taking medications as prescribed? yes (P)      How do you get to doctors appointments? car, drives self (P)      Are you on dialysis? No (P)      Do you take coumadin? No (P)      Discharge Plan A Home with family (P)      DME Needed Upon Discharge  none (P)      Discharge Plan discussed with: Patient (P)      Transition of  Care Barriers None (P)         Physical Activity    On average, how many days per week do you engage in moderate to strenuous exercise (like a brisk walk)? 0 days (P)      On average, how many minutes do you engage in exercise at this level? 0 min (P)         Financial Resource Strain    How hard is it for you to pay for the very basics like food, housing, medical care, and heating? Not hard at all (P)         Housing Stability    In the last 12 months, was there a time when you were not able to pay the mortgage or rent on time? No (P)      In the last 12 months, how many places have you lived? 1 (P)      In the last 12 months, was there a time when you did not have a steady place to sleep or slept in a shelter (including now)? No (P)         Transportation Needs    In the past 12 months, has lack of transportation kept you from medical appointments or from getting medications? No (P)      In the past 12 months, has lack of transportation kept you from meetings, work, or from getting things needed for daily living? No (P)         Food Insecurity    Within the past 12 months, you worried that your food would run out before you got the money to buy more. Never true (P)      Within the past 12 months, the food you bought just didn't last and you didn't have money to get more. Never true (P)         Stress    Do you feel stress - tense, restless, nervous, or anxious, or unable to sleep at night because your mind is troubled all the time - these days? Rather much (P)         Social Connections    In a typical week, how many times do you talk on the phone with family, friends, or neighbors? More than three times a week (P)      How often do you get together with friends or relatives? Three times a week (P)      How often do you attend Tenriism or Judaism services? Never (P)      Do you belong to any clubs or organizations such as Tenriism groups, unions, fraternal or athletic groups, or school groups? No (P)      How often  do you attend meetings of the clubs or organizations you belong to? Never (P)      Are you , , , , never , or living with a partner?  (P)         Alcohol Use    Q1: How often do you have a drink containing alcohol? Never (P)      Q2: How many drinks containing alcohol do you have on a typical day when you are drinking? Patient does not drink (P)      Q3: How often do you have six or more drinks on one occasion? Never (P)

## 2023-07-22 NOTE — ASSESSMENT & PLAN NOTE
- patient denies history of CKD, reports that she had acute episode of decreased kidney function after CT with contrast in past  - Cr 0.9 on admission, seems to be at baseline  - will avoid nephrotoxic medications while admitted

## 2023-07-22 NOTE — SUBJECTIVE & OBJECTIVE
Interval History: MEJIA. Reports pain has improved this morning and she is only requiring tylenol. No further episodes of vomiting. Nausea controlled with current regimen. Voiding and ambulating spontaneously. + Flatus. Feels hungry    Scheduled Meds:   acetaminophen  650 mg Oral Q6H    enoxparin  40 mg Subcutaneous Daily    ondansetron  4 mg Intravenous Q6H    pantoprazole  40 mg Oral Daily     Continuous Infusions:   lactated ringers 100 mL/hr at 07/21/23 2310     PRN Meds:hydrALAZINE, HYDROmorphone, morphine, promethazine (PHENERGAN) IVPB, sodium chloride 0.9%    Review of patient's allergies indicates:   Allergen Reactions    Adhesive Dermatitis and Swelling     Skin adhesive (skinaffix)    Iodinated contrast media Other (See Comments), Diarrhea and Shortness Of Breath    Iodine Other (See Comments)     FAINTING, renal issues, Bilateral flank spasms  IODINE IN CONTRAST    Chamomile flower Hives    Codeine     Dyclonine Hives    Mold Other (See Comments)    Pcn [penicillins]     Pennyroyal oil Other (See Comments)    Peppermint Other (See Comments)    Diphenhydramine hcl Rash     Sometimes rash       Objective:     Vital Signs (Most Recent):  Temp: 97.7 °F (36.5 °C) (07/23/23 0808)  Pulse: 71 (07/23/23 0808)  Resp: 16 (07/23/23 0808)  BP: (!) 155/69 (07/23/23 0808)  SpO2: 97 % (07/23/23 0808) Vital Signs (24h Range):  Temp:  [97.6 °F (36.4 °C)-98.1 °F (36.7 °C)] 97.7 °F (36.5 °C)  Pulse:  [69-82] 71  Resp:  [16-20] 16  SpO2:  [93 %-97 %] 97 %  BP: (138-194)/(63-81) 155/69     Weight: 55.5 kg (122 lb 5.7 oz)  Body mass index is 19.75 kg/m².    Intake/Output - Last 3 Shifts         07/21 0700 07/22 0659 07/22 0700 07/23 0659 07/23 0700 07/24 0659    Urine (mL/kg/hr) 200 400 (0.3)     Emesis/NG output  200     Total Output 200 600     Net -200 -600                          Physical Exam:   Constitutional: She is oriented to person, place, and time.       Cardiovascular:  Normal rate.             Pulmonary/Chest:  Effort normal. No respiratory distress.        Abdominal: Soft. Bowel sounds are normal. She exhibits distension (improved from yesterday). There is no abdominal tenderness. There is no rebound and no guarding.             Musculoskeletal: Normal range of motion.       Neurological: She is alert and oriented to person, place, and time.    Skin: Skin is warm and dry.    Psychiatric: She has a normal mood and affect.        Lines/Drains/Airways       Peripheral Intravenous Line  Duration                  Peripheral IV - Single Lumen 07/21/23 0928 20 G Left Forearm 1 day                    Laboratory:  BMP:   Recent Labs   Lab 07/21/23  0928 07/22/23  0404   * 86    141   K 3.9 4.2    109   CO2 21* 28   BUN 18 12   CREATININE 0.9 0.8   CALCIUM 10.0 8.7   MG 2.1  --     and CBC:   Recent Labs   Lab 07/21/23  0928 07/22/23  0404   WBC 7.82 2.70*   HGB 12.2 9.6*   HCT 36.2* 28.4*    176

## 2023-07-22 NOTE — SUBJECTIVE & OBJECTIVE
Interval History: NAEON. Reports pain has improved this morning. Reports constant nausea with one episode of vomiting this morning. Voiding and ambulating spontaneously. Denies passing gas or having bowel movement since admission.     Scheduled Meds:   acetaminophen  650 mg Oral Q6H    enoxparin  40 mg Subcutaneous Daily    ondansetron  4 mg Intravenous Q6H    pantoprazole  40 mg Oral Daily     Continuous Infusions:   lactated ringers 100 mL/hr at 07/21/23 2310     PRN Meds:hydrALAZINE, HYDROmorphone, morphine, promethazine (PHENERGAN) IVPB, sodium chloride 0.9%    Review of patient's allergies indicates:   Allergen Reactions    Adhesive Dermatitis and Swelling     Skin adhesive (skinaffix)    Iodinated contrast media Other (See Comments), Diarrhea and Shortness Of Breath    Iodine Other (See Comments)     FAINTING, renal issues, Bilateral flank spasms  IODINE IN CONTRAST    Chamomile flower Hives    Codeine     Dyclonine Hives    Mold Other (See Comments)    Pcn [penicillins]     Pennyroyal oil Other (See Comments)    Peppermint Other (See Comments)    Diphenhydramine hcl Rash     Sometimes rash       Objective:     Vital Signs (Most Recent):  Temp: 98.6 °F (37 °C) (07/22/23 0737)  Pulse: 67 (07/22/23 0737)  Resp: 16 (07/22/23 0737)  BP: (!) 189/80 (07/22/23 0737)  SpO2: (!) 94 % (07/22/23 0906) Vital Signs (24h Range):  Temp:  [97.8 °F (36.6 °C)-99 °F (37.2 °C)] 98.6 °F (37 °C)  Pulse:  [67-79] 67  Resp:  [14-20] 16  SpO2:  [94 %-99 %] 94 %  BP: (152-189)/(67-80) 189/80     Weight: 55.5 kg (122 lb 5.7 oz)  Body mass index is 19.75 kg/m².    Intake/Output - Last 3 Shifts         07/20 0700 07/21 0659 07/21 0700 07/22 0659 07/22 0700 07/23 0659    Urine (mL/kg/hr)  200     Total Output  200     Net  -200                          Physical Exam:   Constitutional: She is oriented to person, place, and time.       Cardiovascular:  Normal rate.             Pulmonary/Chest: Effort normal. No respiratory distress.         Abdominal: Soft. She exhibits distension. Bowel sounds are decreased. There is no abdominal tenderness. There is no rebound and no guarding.             Musculoskeletal: Normal range of motion.       Neurological: She is alert and oriented to person, place, and time.    Skin: Skin is warm and dry.    Psychiatric: She has a normal mood and affect.        Lines/Drains/Airways       Peripheral Intravenous Line  Duration                  Peripheral IV - Single Lumen 07/21/23 0928 20 G Left Forearm 1 day                    Laboratory:  BMP:   Recent Labs   Lab 07/21/23  0928 07/22/23  0404   * 86    141   K 3.9 4.2    109   CO2 21* 28   BUN 18 12   CREATININE 0.9 0.8   CALCIUM 10.0 8.7   MG 2.1  --     and CBC:   Recent Labs   Lab 07/21/23  0928 07/22/23  0404   WBC 7.82 2.70*   HGB 12.2 9.6*   HCT 36.2* 28.4*    176

## 2023-07-22 NOTE — CARE UPDATE
Afternoon Assessment:    Resident to bedside for afternoon assessment. Patient reports that she was able to ambulate the hospital halls today as well as ambulate to use the restroom without vomiting. She has noticed a few gargles in her abdomen but has not passed gas or had a bowel movement. She reports improvement in her nausea with the phenergan.   She has not required any narcotics for pain today.     Temp:  [97.6 °F (36.4 °C)-98.6 °F (37 °C)] 97.6 °F (36.4 °C)  Pulse:  [67-79] 70  Resp:  [16-20] 16  SpO2:  [93 %-96 %] 93 %  BP: (152-189)/(67-80) 182/79    PE:  General: lying in bed in no acute distress  Lungs: normal work of breathing  Abdomen: soft, slightly distended although improved from this AM, non-tender to palpation    Labs:  Recent Labs   Lab 07/22/23  0404   WBC 2.70*   RBC 3.00*   HGB 9.6*   HCT 28.4*      MCV 95   MCH 32.0*   MCHC 33.8       A/P:  Continue NPO, will reassess in AM  Continue to monitor bowel function  Pain improved  Continue zofran for nausea and phenergan PRN     Najma Strong MD  PGY-3 OB/GYN

## 2023-07-22 NOTE — ASSESSMENT & PLAN NOTE
- two episodes of vomiting prior to presentation with one additional episode in ED  - nausea improved with zofran and phenergan  - CMP and Magnesium WNL  - Hypoactive bowel sounds on exam, abdomen still slightly distended  - antiemetics: scheduled zofran q6 and compazine prn, will switch compazine to PRN phenergan  - diet: will make NPO due to persistent nausea, with ice chips  - AM CBC, BMP ordered

## 2023-07-22 NOTE — ASSESSMENT & PLAN NOTE
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  A partial or low-grade obstruction may be considered.  There is distal gas and stool within the colon, which is not significantly dilated.  - In ED, required 4 mg morphine and two doses of dilaudid with persistent pain  - Admitted to observation for pain control  - Reports pain is improved this morning  - Discussed adding scheduled ibuprofen alternating with tylenol as creatinine is normal but patient reports it upsets her stomach  - Discussed trying to limit narcotics if able to help with constipation  - Pain regimen: scheduled tylenol q6, 15mg morphine q4 prn, dilaudid btp

## 2023-07-22 NOTE — CARE UPDATE
07/22/23 0906   PRE-TX-O2   Device (Oxygen Therapy) room air   SpO2 (!) 94 %   Pulse Oximetry Type Intermittent   $ Pulse Oximetry - Multiple Charge Pulse Oximetry - Multiple

## 2023-07-23 VITALS
DIASTOLIC BLOOD PRESSURE: 69 MMHG | HEIGHT: 66 IN | HEART RATE: 71 BPM | OXYGEN SATURATION: 97 % | BODY MASS INDEX: 19.67 KG/M2 | SYSTOLIC BLOOD PRESSURE: 155 MMHG | WEIGHT: 122.38 LBS | RESPIRATION RATE: 16 BRPM | TEMPERATURE: 98 F

## 2023-07-23 PROBLEM — R10.9 ABDOMINAL PAIN: Status: RESOLVED | Noted: 2023-07-21 | Resolved: 2023-07-23

## 2023-07-23 PROCEDURE — 99238 PR HOSPITAL DISCHARGE DAY,<30 MIN: ICD-10-PCS | Mod: ,,, | Performed by: OBSTETRICS & GYNECOLOGY

## 2023-07-23 PROCEDURE — 63600175 PHARM REV CODE 636 W HCPCS

## 2023-07-23 PROCEDURE — 99238 HOSP IP/OBS DSCHRG MGMT 30/<: CPT | Mod: ,,, | Performed by: OBSTETRICS & GYNECOLOGY

## 2023-07-23 PROCEDURE — 25000003 PHARM REV CODE 250

## 2023-07-23 RX ORDER — PROMETHAZINE HYDROCHLORIDE 12.5 MG/1
12.5 TABLET ORAL EVERY 6 HOURS PRN
Qty: 15 TABLET | Refills: 0 | Status: SHIPPED | OUTPATIENT
Start: 2023-07-23 | End: 2023-07-23 | Stop reason: SDUPTHER

## 2023-07-23 RX ORDER — PROMETHAZINE HYDROCHLORIDE 12.5 MG/1
12.5 TABLET ORAL EVERY 6 HOURS PRN
Qty: 15 TABLET | Refills: 0 | Status: SHIPPED | OUTPATIENT
Start: 2023-07-23 | End: 2023-09-12 | Stop reason: SDUPTHER

## 2023-07-23 RX ORDER — DEXTROMETHORPHAN HYDROBROMIDE, GUAIFENESIN 5; 100 MG/5ML; MG/5ML
650 LIQUID ORAL EVERY 6 HOURS PRN
Qty: 30 TABLET | Refills: 1 | Status: SHIPPED | OUTPATIENT
Start: 2023-07-23 | End: 2023-08-29

## 2023-07-23 RX ADMIN — ONDANSETRON 4 MG: 2 INJECTION INTRAMUSCULAR; INTRAVENOUS at 06:07

## 2023-07-23 RX ADMIN — ACETAMINOPHEN 650 MG: 325 TABLET, FILM COATED ORAL at 06:07

## 2023-07-23 NOTE — PROGRESS NOTES
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 The pain began early this morning and was associated with nausea. She tried taking Zofran without relief. When her pain did not improve, she decided to present to the ED. She has baseline mild abdominal pain but does not take chronic narcotics. She also reports baseline constipation however has had three bowel movements since presenting to the ED. Of note, patient is lactose intolerant but reports having a cream sauce with her fish pasta last night. Since this morning she has had three episodes of vomiting. She denies any fevers, chills, chest pain, or shortness of breath that is different from her baseline.     PMH is otherwise significant for IBS and CKD.      Hospital Course:  07/21/2023 - Admitted to observation for pain control. Clear liquid diet. Anti-emetics scheduled.   07/22/2023 - NAEON. Pain improved overnight. One episode of emesis this morning with continued nausea. Will make NPO  07/23/2023 - NAEON. Pain near resolved, only requiring tylenol. Nausea improved. Hungry this AM. + Flatus      Interval History: NAEON. Reports pain has improved this morning and she is only requiring tylenol. No further episodes of vomiting. Nausea controlled with current regimen. Voiding and ambulating spontaneously. + Flatus. Feels hungry    Scheduled Meds:   acetaminophen  650 mg Oral Q6H    enoxparin  40 mg Subcutaneous  Daily    ondansetron  4 mg Intravenous Q6H    pantoprazole  40 mg Oral Daily     Continuous Infusions:   lactated ringers 100 mL/hr at 07/21/23 2310     PRN Meds:hydrALAZINE, HYDROmorphone, morphine, promethazine (PHENERGAN) IVPB, sodium chloride 0.9%    Review of patient's allergies indicates:   Allergen Reactions    Adhesive Dermatitis and Swelling     Skin adhesive (skinaffix)    Iodinated contrast media Other (See Comments), Diarrhea and Shortness Of Breath    Iodine Other (See Comments)     FAINTING, renal issues, Bilateral flank spasms  IODINE IN CONTRAST    Chamomile flower Hives    Codeine     Dyclonine Hives    Mold Other (See Comments)    Pcn [penicillins]     Pennyroyal oil Other (See Comments)    Peppermint Other (See Comments)    Diphenhydramine hcl Rash     Sometimes rash       Objective:     Vital Signs (Most Recent):  Temp: 97.7 °F (36.5 °C) (07/23/23 0808)  Pulse: 71 (07/23/23 0808)  Resp: 16 (07/23/23 0808)  BP: (!) 155/69 (07/23/23 0808)  SpO2: 97 % (07/23/23 0808) Vital Signs (24h Range):  Temp:  [97.6 °F (36.4 °C)-98.1 °F (36.7 °C)] 97.7 °F (36.5 °C)  Pulse:  [69-82] 71  Resp:  [16-20] 16  SpO2:  [93 %-97 %] 97 %  BP: (138-194)/(63-81) 155/69     Weight: 55.5 kg (122 lb 5.7 oz)  Body mass index is 19.75 kg/m².    Intake/Output - Last 3 Shifts         07/21 0700 07/22 0659 07/22 0700 07/23 0659 07/23 0700 07/24 0659    Urine (mL/kg/hr) 200 400 (0.3)     Emesis/NG output  200     Total Output 200 600     Net -200 -600                          Physical Exam:   Constitutional: She is oriented to person, place, and time.       Cardiovascular:  Normal rate.             Pulmonary/Chest: Effort normal. No respiratory distress.        Abdominal: Soft. Bowel sounds are normal. She exhibits distension (improved from yesterday). There is no abdominal tenderness. There is no rebound and no guarding.             Musculoskeletal: Normal range of motion.       Neurological: She is alert and  oriented to person, place, and time.    Skin: Skin is warm and dry.    Psychiatric: She has a normal mood and affect.        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  A partial or low-grade obstruction may be considered.  There is distal gas and stool within the colon, which is not significantly dilated.  - In ED, required 4 mg morphine and two doses of dilaudid with persistent pain  - Admitted to observation for pain control  - Reports pain significantly improved this AM, only requiring tylenol  - Pain regimen: scheduled tylenol q6, 15mg morphine q4 prn, dilaudid btp    Constipation  - Discussed initiation of constipation medications however patient reports her stomach is sensitive to most medications  - Will hold off for now and monitor bowel function  - Discussed limiting narcotics if possible  - + flatus today    Hypertension  - Patient denies past history of HTN  - Elevated BP overnight  - Hydral PRN ordered    Chronic kidney disease (CKD)  - patient denies history of CKD, reports that she had acute episode of decreased kidney function after CT with contrast in  past  - Cr 0.9 on admission, seems to be at baseline  - will avoid nephrotoxic medications while admitted    Nausea and vomiting  - two episodes of vomiting prior to presentation with one additional episode in ED  - nausea significantly improved with zofran and phenergan  - normal bowel sounds on exam today  - antiemetics: scheduled zofran q6 and phenergan PRN  - diet: will advance to regular diet    Ovarian cancer  - recurrent high grade serous ovarian cancer, see Onc history above  - currently on lynparza BID  - will hold while inpatient due to side effects of nausea/vomiting  - Lovenox 40 daily for DVT ppx        VTE Risk Mitigation (From admission, onward)         Ordered     enoxaparin injection 40 mg  Daily         07/21/23 1351     IP VTE HIGH RISK PATIENT  Once         07/21/23 1351     Place sequential compression device  Until discontinued         07/21/23 1351              Will consider discharge today if able to tolerate breakfast.    Najma Strong MD  Gynecologic Oncology  Scientologist [facility] ([address])

## 2023-07-23 NOTE — PLAN OF CARE
Spoke with  regarding discharge - aware of need for follow up with Dr Hodgson in 2 weeks - instructions placed on AVS for patient to schedule appt once office reopens tomorrow - new med escribed for bedside delivery but they are closed today - secure chat to MD to resend to preferred [facility] -  will provide transportation home - awaiting patient to tolerate diet     [facility] ([address])  Discharge Final Note    Primary Care Provider: Neymar Cash MD    Expected Discharge Date: 7/23/2023    Final Discharge Note (most recent)       Final Note - 07/23/23 0923          Final Note    Assessment Type Final Discharge Note     Anticipated Discharge Disposition Home or Self Care     What phone number can be called within the next 1-3 days to see how you are doing after discharge? [phone]     Hospital Resources/Appts/Education Provided Provided patient/caregiver with written discharge plan information        Post-Acute Status    Other Status No Post-Acute Service Needs     Discharge Delays Diet Not Ready for Discharge                   Contact Info       Jerrell Hodgson MD   Specialty: Gynecologic Oncology, Gynecology, Oncology    [address] [facility] [facility]  [address]  [facility] [address]   Phone: [phone]       Next Steps: Follow up in 2 week(s)    Instructions: Hospital follow up

## 2023-07-23 NOTE — ASSESSMENT & PLAN NOTE
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  A partial or low-grade obstruction may be considered.  There is distal gas and stool within the colon, which is not significantly dilated.  - In ED, required 4 mg morphine and two doses of dilaudid with persistent pain  - Admitted to observation for pain control  - Reports pain significantly improved this AM, only requiring tylenol  - Pain regimen: scheduled tylenol q6, 15mg morphine q4 prn, dilaudid btp

## 2023-07-23 NOTE — ASSESSMENT & PLAN NOTE
- two episodes of vomiting prior to presentation with one additional episode in ED  - nausea significantly improved with zofran and phenergan  - normal bowel sounds on exam today  - antiemetics: scheduled zofran q6 and phenergan PRN  - diet: will advance to regular diet

## 2023-07-23 NOTE — ASSESSMENT & PLAN NOTE
- Discussed initiation of constipation medications however patient reports her stomach is sensitive to most medications  - Will hold off for now and monitor bowel function  - Discussed limiting narcotics if possible  - + flatus today

## 2023-07-23 NOTE — DISCHARGE SUMMARY
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 The pain began early this morning and was associated with nausea. She tried taking Zofran without relief. When her pain did not improve, she decided to present to the ED. She has baseline mild abdominal pain but does not take chronic narcotics. She also reports baseline constipation however has had three bowel movements since presenting to the ED. Of note, patient is lactose intolerant but reports having a cream sauce with her fish pasta last night. Since this morning she has had three episodes of vomiting. She denies any fevers, chills, chest pain, or shortness of breath that is different from her baseline.     PMH is otherwise significant for IBS and CKD.      Hospital Course:  07/21/2023 - Admitted to observation for pain control. Clear liquid diet. Anti-emetics scheduled.   07/22/2023 - NAEON. Pain improved overnight. One episode of emesis this morning with continued nausea. Will make NPO  07/23/2023 - NAEON. Pain near resolved, only requiring tylenol. Nausea improved. Hungry this AM. + Flatus    Patient able to tolerate regular diet for breakfast. Stable for discharge with strict return precautions.     Goals of Care Treatment Preferences:  Code Status: Full Code      * No surgery found *     Consults (From admission, onward)          Status Ordering Provider     Inpatient consult to Palliative Care  Once        Provider:  (Not yet assigned)    Acknowledged GOLD,  YESSENIA     Inpatient consult to Gynecologic Oncology  Once        Provider:  (Not yet assigned)    Completed YESSENIA PASCUAL            Significant Diagnostic Studies:  CT Abdomen Pelvis  Without Contrast  Order: [id number]  Status: Final result     Visible to patient: Yes (not seen)     Next appt: 07/26/2023 at 01:00 PM in Hematology and Oncology ([facility], [facility])     0 Result Notes  Details    Reading Physician Reading Date Result Priority   Suman Krueger MD  [phone] 7/21/2023 STAT     Narrative & Impression  EXAMINATION:  CT ABDOMEN PELVIS WITHOUT CONTRAST     CLINICAL HISTORY:  Nausea/vomiting;Abdominal pain, acute, nonlocalized;     TECHNIQUE:  Low dose axial images, sagittal and coronal reformations were obtained from the lung bases to the pubic symphysis.  Oral contrast was not administered.     COMPARISON:  06/03/2020     FINDINGS:  Limited evaluation lung bases show no concerning masses or nodules.     The liver is normal in size.  No solid mass noting lack of IV contrast.  Scattered hepatic cysts are unchanged from prior exam.  Gallbladder is grossly unremarkable.     The spleen, adrenal glands, and pancreas show no focal abnormality noting lack of IV contrast.     Bilateral kidneys are normal in size and position.  No hydronephrosis or nephrolithiasis.  Urinary bladder shows no focal wall thickening.  Uterus is absent.     There is diffuse small bowel dilatation and a few scattered gas fluid levels.  A definitive transition point is not identified.  Colon is decompressed and contains fecal contents.  There is no free fluid or free gas.  No concerning lymphadenopathy.     Moderate atherosclerosis without aneurysm.  No evidence of an acute fracture or destructive osseous lesion.     Impression:     Mild diffuse small bowel dilatation without an identifiable transition point.  A partial or low-grade obstruction may be considered.  There is distal gas and stool within the colon, which is not  significantly dilated.        Electronically signed by: Suman Krueger MD  Date:                                            07/21/2023  Time:                                           10:51         Pending Diagnostic Studies:       None          Final Active Diagnoses:    Diagnosis Date Noted POA    Hypertension [I10] 07/22/2023 Yes    Constipation [K59.00] 07/22/2023 Yes    Ovarian cancer [C56.9] 07/21/2023 Yes    Nausea and vomiting [R11.2] 07/21/2023 Yes    Chronic kidney disease (CKD) [N18.9] 07/21/2023 Yes      Problems Resolved During this Admission:    Diagnosis Date Noted Date Resolved POA    PRINCIPAL PROBLEM:  Abdominal pain [R10.9] 07/21/2023 07/23/2023 Yes        Discharged Condition: good    Disposition: Home or Self Care    Follow Up:   Follow-up Information       Jerrell Hodgson MD Follow up in 2 week(s).    Specialties: Gynecologic Oncology, Gynecology, Oncology  Why: Hospital follow up  Contact information:  [address] [address]  [phone]                           Patient Instructions:      Diet Adult Regular     Notify your health care provider if you experience any of the following:  temperature >100.4     Notify your health care provider if you experience any of the following:  persistent nausea and vomiting or diarrhea     Notify your health care provider if you experience any of the following:  severe uncontrolled pain     Notify your health care provider if you experience any of the following:  difficulty breathing or increased cough     Notify your health care provider if you experience any of the following:  severe persistent headache     Notify your health care provider if you experience any of the following:  worsening rash     Notify your health care provider if you experience any of the following:  increased confusion or weakness     Notify your health care provider if you experience any of the following:  persistent dizziness, light-headedness, or  visual disturbances     Other restrictions (specify):   Order Comments: Do not restart Lymparza until at least Tuesday, if still having nausea/vomiting reach out to Dr. Hodgson' office prior to restarting     Activity as tolerated     Medications:  Reconciled Home Medications:      Medication List        START taking these medications      acetaminophen 650 MG Tbsr  Commonly known as: TYLENOL  Take 1 tablet (650 mg total) by mouth every 6 (six) hours as needed (pain).     promethazine 12.5 MG Tab  Commonly known as: PHENERGAN  Take 1 tablet (12.5 mg total) by mouth every 6 (six) hours as needed (Nausea/vomiting).            CONTINUE taking these medications      b complex vitamins capsule  Take 1 capsule by mouth.     cholecalciferol (vitamin D3) 25 mcg (1,000 unit) capsule  Commonly known as: VITAMIN D3  Take 7,000 Units by mouth.     digestive enzymes Cap  Take by mouth.     LIDOcaine-prilocaine cream  Commonly known as: EMLA  Please apply to area 1 hour prior to procedure.     olaparib 100 mg Tab  Take 200 mg by mouth 2 (two) times a day.     ondansetron 4 MG tablet  Commonly known as: ZOFRAN  Take 4 mg by mouth.              Najma Strong MD  Gynecologic Oncology  [facility] ([address])

## 2023-07-24 ENCOUNTER — PATIENT MESSAGE (OUTPATIENT)
Dept: GYNECOLOGIC ONCOLOGY | Facility: CLINIC | Age: 68
End: 2023-07-24
Payer: MEDICARE

## 2023-07-25 ENCOUNTER — TELEPHONE (OUTPATIENT)
Dept: GYNECOLOGIC ONCOLOGY | Facility: CLINIC | Age: 68
End: 2023-07-25
Payer: MEDICARE

## 2023-07-25 ENCOUNTER — OFFICE VISIT (OUTPATIENT)
Dept: GYNECOLOGIC ONCOLOGY | Facility: CLINIC | Age: 68
End: 2023-07-25
Payer: MEDICARE

## 2023-07-25 VITALS
HEART RATE: 84 BPM | DIASTOLIC BLOOD PRESSURE: 67 MMHG | SYSTOLIC BLOOD PRESSURE: 155 MMHG | BODY MASS INDEX: 20.5 KG/M2 | WEIGHT: 127 LBS

## 2023-07-25 DIAGNOSIS — C56.3 MALIGNANT NEOPLASM OF BOTH OVARIES: Primary | ICD-10-CM

## 2023-07-25 DIAGNOSIS — R11.14 BILIOUS VOMITING WITH NAUSEA: ICD-10-CM

## 2023-07-25 PROCEDURE — 99214 PR OFFICE/OUTPT VISIT, EST, LEVL IV, 30-39 MIN: ICD-10-PCS | Mod: S$PBB,,, | Performed by: OBSTETRICS & GYNECOLOGY

## 2023-07-25 PROCEDURE — 99999 PR PBB SHADOW E&M-EST. PATIENT-LVL III: ICD-10-PCS | Mod: PBBFAC,,, | Performed by: OBSTETRICS & GYNECOLOGY

## 2023-07-25 PROCEDURE — 99214 OFFICE O/P EST MOD 30 MIN: CPT | Mod: S$PBB,,, | Performed by: OBSTETRICS & GYNECOLOGY

## 2023-07-25 PROCEDURE — 99999 PR PBB SHADOW E&M-EST. PATIENT-LVL III: CPT | Mod: PBBFAC,,, | Performed by: OBSTETRICS & GYNECOLOGY

## 2023-07-25 PROCEDURE — 99213 OFFICE O/P EST LOW 20 MIN: CPT | Mod: PBBFAC | Performed by: OBSTETRICS & GYNECOLOGY

## 2023-07-25 NOTE — TELEPHONE ENCOUNTER
----- Message from Najma May sent at 7/25/2023  9:59 AM CDT -----  Contact: NAJMA MCDONALD [[id number]]  Type: Call Back      Who called: NAJMA MCDONALD [[id number]]      What is the request in detail: Patient is requesting a call back. Pt states that she was admitted to the hospital over the weekend. She is calling to schedule a follow up appointment.   Please advise.     Can the clinic reply by [facility]? No      Would the patient rather a call back or a response via [facility]? Call back       Best call back number: [phone]      Additional Information:

## 2023-07-25 NOTE — TELEPHONE ENCOUNTER
Spoke with the patient she states that she was told that she can see Dr. Nava. Message sent back to staff to give the patient a call----- Message from Alexis Pressley RN sent at 7/25/2023 10:17 AM CDT -----  Contact: YESSENIA MCDONALD [[id number]]    ----- Message -----  From: Yessenia May  Sent: 7/25/2023  10:08 AM CDT  To: Rema Allan Staff    Type: Call Back      Who called: YESSENIA MCDONALD [[id number]]      What is the request in detail: Patient is requesting a call back. Pt states that she was admitted to the hospital over the weekend. She is calling to schedule a follow up appointment.   Please advise.     Can the clinic reply by [facility]? No      Would the patient rather a call back or a response via [facility]? Call back       Best call back number: [phone]      Additional Information:

## 2023-07-25 NOTE — PROGRESS NOTES
Audio Only Telehealth Visit     The patient location is: [place]   The chief complaint leading to consultation is: IBS/food intolerance   Visit type: Virtual visit with audio only (telephone)  Total time spent with patient: 55     The reason for the audio only service rather than synchronous audio and video virtual visit was related to technical difficulties or patient preference/necessity.     Each patient to whom I provide medical services by telemedicine is:  (1) informed of the relationship between the physician and patient and the respective role of any other health care provider with respect to management of the patient; and (2) notified that they may decline to receive medical services by telemedicine and may withdraw from such care at any time. Patient verbally consented to receive this service via voice-only telephone call.     This service was not originating from a related E/M service provided within the previous 7 days nor will  to an E/M service or procedure within the next 24 hours or my soonest available appointment.  Prevailing standard of care was able to be met in this audio-only visit.      Oncology Nutrition Assessment for Medical Nutrition Therapy  Initial Visit    Najma Hung   1955    Referring Provider:  Samia Mike, *      Reason for Visit: Pt in for education and nutrition counseling     PMHx:   Past Medical History:   Diagnosis Date    CKD (chronic kidney disease)     IBS (irritable bowel syndrome)     OAB (overactive bladder)     Ovarian cancer 2020    stage iv metastatic       Nutrition Assessment    This is a 67 y.o.female with stage metastatic Papillary Serous Ovarian Carcinoma s/p Xlap/BSO/Omentectomy/Pelvic and PA nodes/Peritoneal stripping and debulking 6/20. Followed by multiple rounds of chemotherapy. Currently on lynparza. PMH is otherwise significant for IBS and CKD.  She is met Dr. Andrew Weil in the past and is very interested in integrative  "therapies. Recently had visit with Dr. Mike.   She reports a history of IBS which makes her diet very limited. She has had a lot of constipation. Increasing fluids, walking, and took Lidia Lax. She has done a lot of digging into integrative medicine to help her body heal (started 24 years ago with IBS diagnosis). She does not drink or smoke and has always exercised and concentrated on eating well.   She would like more variety in her diet. Previously tried being gluten free to help with GI symptoms but lost too much weight (down to about 113lb). Comfortable at current weight.   She cannot due enteric coated peppermint due to allergy.     Weight:55.8 kg (123 lb)  Height:5' 6" (1.676 m)  BMI:Body mass index is 19.85 kg/m².   IBW: Ideal body weight: 59.3 kg (130 lb 11.7 oz)    Allergies: Adhesive, Iodinated contrast media, Iodine, Chamomile flower, Codeine, Dyclonine, Mold, Pcn [penicillins], Pennyroyal oil, Peppermint, and Diphenhydramine hcl    Current Medications:    Current Outpatient Medications:     acetaminophen (TYLENOL) 650 MG TbSR, Take 1 tablet (650 mg total) by mouth every 6 (six) hours as needed (pain)., Disp: 30 tablet, Rfl: 1    b complex vitamins capsule, Take 1 capsule by mouth., Disp: , Rfl:     cholecalciferol, vitamin D3, (VITAMIN D3) 25 mcg (1,000 unit) capsule, Take 7,000 Units by mouth., Disp: , Rfl:     digestive enzymes Cap, Take by mouth., Disp: , Rfl:     LIDOcaine-prilocaine (EMLA) cream, Please apply to area 1 hour prior to procedure., Disp: , Rfl:     olaparib 100 mg Tab, Take 200 mg by mouth 2 (two) times a day., Disp: 120 tablet, Rfl: 6    ondansetron (ZOFRAN) 4 MG tablet, Take 4 mg by mouth., Disp: , Rfl:     promethazine (PHENERGAN) 12.5 MG Tab, Take 1 tablet (12.5 mg total) by mouth every 6 (six) hours as needed (Nausea/vomiting)., Disp: 15 tablet, Rfl: 0    Vitamins/Supplements: probiotics, digestive enzymes, vitamin D, B complex     Labs: Reviewed from 7/22    Nutrition Diagnosis  "   Problem: altered GI function  Etiology (related to):  IBS history   Signs/Symptoms (as evidenced by):  constipation, food intolerances, previous weight loss    Nutrition Intervention    Nutrition Prescription   1400 Kcals (25kcal/kg)  56-67 g protein (1-1.2g/kg)   2056-8746 mL fluid (25-30mL/kg)    Recommendations:  Reviewed 5R protocol   -Low FODMAP diet   -at minimum dairy and gluten free   -continue current supplements   -L glutamine 500 mg TIB  -psyllium fiber (ground flax rather than metamucil due to recent CT findings)   -yoga, acupuncture    Materials Provided/Reviewed   Low FODMAP diet   5R protocol for IBS     Nutrition Monitoring and Evaluation    Monitor: diet tolerance , diet education needs , and symptom management     Goals: improve diet variety, food tolerance     Follow up In 4 weeks     Communication to referring provider/care team: note available in chart     Counseling time: 1 Hour    Rhoda Bauer, MPH, RD, , LDN, FAND   [phone]

## 2023-07-26 ENCOUNTER — PATIENT MESSAGE (OUTPATIENT)
Dept: GYNECOLOGIC ONCOLOGY | Facility: CLINIC | Age: 68
End: 2023-07-26
Payer: MEDICARE

## 2023-07-26 ENCOUNTER — CLINICAL SUPPORT (OUTPATIENT)
Dept: HEMATOLOGY/ONCOLOGY | Facility: CLINIC | Age: 68
End: 2023-07-26
Payer: MEDICARE

## 2023-07-26 ENCOUNTER — TELEPHONE (OUTPATIENT)
Dept: GYNECOLOGIC ONCOLOGY | Facility: CLINIC | Age: 68
End: 2023-07-26
Payer: MEDICARE

## 2023-07-26 VITALS — HEIGHT: 66 IN | WEIGHT: 123 LBS | BODY MASS INDEX: 19.77 KG/M2

## 2023-07-26 DIAGNOSIS — R63.4 WEIGHT LOSS: ICD-10-CM

## 2023-07-26 DIAGNOSIS — C56.9 MALIGNANT NEOPLASM OF OVARY, UNSPECIFIED LATERALITY: ICD-10-CM

## 2023-07-26 DIAGNOSIS — K58.9 IRRITABLE BOWEL SYNDROME, UNSPECIFIED TYPE: ICD-10-CM

## 2023-07-26 DIAGNOSIS — Z71.3 NUTRITIONAL COUNSELING: Primary | ICD-10-CM

## 2023-07-26 PROBLEM — Z51.11 ENCOUNTER FOR ANTINEOPLASTIC CHEMOTHERAPY: Status: ACTIVE | Noted: 2023-07-26

## 2023-07-26 PROCEDURE — 98968 PR NONPHYSICIAN TELEPHONE ASSESSMENT 21-30 MIN: ICD-10-PCS | Mod: 95,,, | Performed by: DIETITIAN, REGISTERED

## 2023-07-26 PROCEDURE — 98968 PH1 ASSMT&MGMT NQHP 21-30: CPT | Mod: 95,,, | Performed by: DIETITIAN, REGISTERED

## 2023-07-26 NOTE — PROGRESS NOTES
Subjective:      Patient ID: Najma Hung is a 67 y.o. female.    Chief Complaint: Follow-up    Treatment History  Stage IIIC high-grade serous ovarian cancer  Xlap/BSO/Omentectomy/Pelvic and PA nodes/Peritoneal stripping and debulking 6/20 by Dr. Olivier  T/C x 2, then Carbo x 3 due to patient intolerance of treatment completed 1/28/21  Elected to not pursue maintenance PARPi  CARIS testing negative  Germline normal, CDX positive  Recurrence documented 5/22, received Carbo + neulasta x 1  No treatment until 11/22, when multifocal progression documented on PET  Doxil/Carbo x 4 completed 4/23  Delayed PARPi therapy until starting Lynparza 200 BID 6/23    HPI  Her toay as a hospital f/u after discharge on Sunday.  Presented to the ED on Friday afternoon with severe cramps as well as N/V x 3 episodes.  Has a h/o intermittent constipation, no SBO.  Non contrast imaging performed in the ED demonstrated nonspecific dilated loops f small bowel without obvious transition ppoint.  Was admitted for pain control and IVF with antiemetics. Responded well and was discharged as above.  Did have 3 large bowel movements in the hospital.  Feels better today.  No further N/V, pain improved.  Review of Systems   Constitutional:  Negative for activity change, appetite change, chills, fatigue and fever.   HENT:  Negative for hearing loss, mouth sores, nosebleeds, sore throat and tinnitus.    Eyes:  Negative for visual disturbance.   Respiratory:  Negative for cough, chest tightness, shortness of breath and wheezing.    Cardiovascular:  Negative for chest pain and leg swelling.   Gastrointestinal:  Negative for abdominal distention, abdominal pain, blood in stool, constipation, diarrhea, nausea and vomiting.   Genitourinary:  Negative for dysuria, flank pain, frequency, hematuria, pelvic pain, vaginal bleeding, vaginal discharge and vaginal pain.   Musculoskeletal:  Negative for arthralgias and back pain.   Skin:  Negative for  rash.   Neurological:  Negative for dizziness, seizures, syncope, weakness and numbness.   Hematological:  Does not bruise/bleed easily.   Psychiatric/Behavioral:  Negative for confusion and sleep disturbance. The patient is not nervous/anxious.      Objective:   Physical Exam:   Constitutional: She is oriented to person, place, and time. She appears well-developed and well-nourished. No distress.    HENT:   Head: Normocephalic and atraumatic.    Eyes: No scleral icterus.     Cardiovascular:       Exam reveals no cyanosis and no edema.        Pulmonary/Chest: Effort normal. No respiratory distress. She exhibits no tenderness.        Abdominal: Soft. She exhibits no distension, no fluid wave and no mass. There is no abdominal tenderness. There is no rebound and no guarding. No hernia.             Musculoskeletal: Normal range of motion and moves all extremeties. No edema.      Lymphadenopathy:     She has no cervical adenopathy.    Neurological: She is alert and oriented to person, place, and time.    Skin: Skin is warm and dry. No cyanosis. No pallor.    Psychiatric: She has a normal mood and affect. Her behavior is normal.     Assessment:     1. Malignant neoplasm of both ovaries    2. Bilious vomiting with nausea        Plan:       Feeling better after recent hospitalization.  May have a component of dysfunctional bowel due to carcinomatosis.  No symptoms of obstruction currently. Hold PARPi until Saturday.  Continue bowel regimen. RTC as scheduled.

## 2023-07-31 ENCOUNTER — TELEPHONE (OUTPATIENT)
Dept: PHARMACY | Facility: CLINIC | Age: 68
End: 2023-07-31
Payer: MEDICARE

## 2023-07-31 ENCOUNTER — TELEPHONE (OUTPATIENT)
Dept: GYNECOLOGIC ONCOLOGY | Facility: CLINIC | Age: 68
End: 2023-07-31
Payer: MEDICARE

## 2023-07-31 ENCOUNTER — SPECIALTY PHARMACY (OUTPATIENT)
Dept: PHARMACY | Facility: CLINIC | Age: 68
End: 2023-07-31
Payer: MEDICARE

## 2023-07-31 DIAGNOSIS — C56.3 MALIGNANT NEOPLASM OF BOTH OVARIES: Primary | ICD-10-CM

## 2023-07-31 NOTE — TELEPHONE ENCOUNTER
Incoming call from patient regarding issues getting in touch with [facility] for olaparib prescription. Provided patient with [facility] phone number: [phone] and advised to reach out to [facility] with any additional questions/ concerns.

## 2023-07-31 NOTE — TELEPHONE ENCOUNTER
LVM trying to return patient call. We had sent an olaparib script to [facility] for her not long ago. Need to see what questions she has. Awaiting callback from patient

## 2023-07-31 NOTE — TELEPHONE ENCOUNTER
Incoming call - patient stated she has been on the line with [facility] for her Lymparza for 2 hours and still is unable to get a hold of them to schedule her refill.    The number that was given to her is [phone].     However, the [facility] number I found online was [phone]; told her we would need to investigate what was the correct number she needs to call and get back to her.    Informed assigned [facility] of the situation. He will contact her once the [facility] info is confirmed.

## 2023-07-31 NOTE — TELEPHONE ENCOUNTER
----- Message from Glenis Cuenca sent at 7/31/2023 12:40 PM CDT -----  Regarding: order concerns  Name of Who is Calling: Najma           What is the request in detail: Patient is requesting a call back because her order weren't sent to [facility].           Can the clinic reply by [facility]: Yes           [facility] [phone]

## 2023-07-31 NOTE — TELEPHONE ENCOUNTER
Patient said she was able to get her Lynparza set up with [facility] All questions and concerns answered.

## 2023-08-03 ENCOUNTER — PATIENT MESSAGE (OUTPATIENT)
Dept: GYNECOLOGIC ONCOLOGY | Facility: CLINIC | Age: 68
End: 2023-08-03
Payer: MEDICARE

## 2023-08-18 ENCOUNTER — LAB VISIT (OUTPATIENT)
Dept: LAB | Facility: OTHER | Age: 68
End: 2023-08-18
Attending: INTERNAL MEDICINE
Payer: MEDICARE

## 2023-08-18 DIAGNOSIS — C56.3 MALIGNANT NEOPLASM OF BOTH OVARIES: Primary | ICD-10-CM

## 2023-08-18 DIAGNOSIS — C56.3 MALIGNANT NEOPLASM OF BOTH OVARIES: ICD-10-CM

## 2023-08-18 LAB
ALBUMIN SERPL BCP-MCNC: 4.2 G/DL (ref 3.5–5.2)
ALP SERPL-CCNC: 78 U/L (ref 55–135)
ALT SERPL W/O P-5'-P-CCNC: 18 U/L (ref 10–44)
ANION GAP SERPL CALC-SCNC: 11 MMOL/L (ref 8–16)
AST SERPL-CCNC: 25 U/L (ref 10–40)
BILIRUB SERPL-MCNC: 0.5 MG/DL (ref 0.1–1)
BUN SERPL-MCNC: 11 MG/DL (ref 8–23)
CALCIUM SERPL-MCNC: 9.9 MG/DL (ref 8.7–10.5)
CANCER AG125 SERPL-ACNC: 1151 U/ML (ref 0–30)
CHLORIDE SERPL-SCNC: 103 MMOL/L (ref 95–110)
CO2 SERPL-SCNC: 25 MMOL/L (ref 23–29)
CREAT SERPL-MCNC: 0.9 MG/DL (ref 0.5–1.4)
ERYTHROCYTE [DISTWIDTH] IN BLOOD BY AUTOMATED COUNT: 16.9 % (ref 11.5–14.5)
EST. GFR  (NO RACE VARIABLE): >60 ML/MIN/1.73 M^2
GLUCOSE SERPL-MCNC: 94 MG/DL (ref 70–110)
HCT VFR BLD AUTO: 33.8 % (ref 37–48.5)
HGB BLD-MCNC: 11.1 G/DL (ref 12–16)
IMM GRANULOCYTES # BLD AUTO: 0 K/UL (ref 0–0.04)
MCH RBC QN AUTO: 31.7 PG (ref 27–31)
MCHC RBC AUTO-ENTMCNC: 32.8 G/DL (ref 32–36)
MCV RBC AUTO: 97 FL (ref 82–98)
NEUTROPHILS # BLD AUTO: 2 K/UL (ref 1.8–7.7)
PLATELET # BLD AUTO: 182 K/UL (ref 150–450)
PMV BLD AUTO: 8.6 FL (ref 9.2–12.9)
POTASSIUM SERPL-SCNC: 4.2 MMOL/L (ref 3.5–5.1)
PROT SERPL-MCNC: 7.7 G/DL (ref 6–8.4)
RBC # BLD AUTO: 3.5 M/UL (ref 4–5.4)
SODIUM SERPL-SCNC: 139 MMOL/L (ref 136–145)
WBC # BLD AUTO: 2.99 K/UL (ref 3.9–12.7)

## 2023-08-18 PROCEDURE — 85027 COMPLETE CBC AUTOMATED: CPT | Performed by: OBSTETRICS & GYNECOLOGY

## 2023-08-18 PROCEDURE — 36415 COLL VENOUS BLD VENIPUNCTURE: CPT | Performed by: OBSTETRICS & GYNECOLOGY

## 2023-08-18 PROCEDURE — 86304 IMMUNOASSAY TUMOR CA 125: CPT | Performed by: OBSTETRICS & GYNECOLOGY

## 2023-08-18 PROCEDURE — 80053 COMPREHEN METABOLIC PANEL: CPT | Performed by: OBSTETRICS & GYNECOLOGY

## 2023-08-18 NOTE — TELEPHONE ENCOUNTER
----- Message from Monica Ervin MA sent at 7/12/2022 10:39 AM CDT -----  Pt desires a new pt appt but has questions such as where would she do pelvic floor rehab, what oncologist would [place] refer to etc. pt was dx with Ovarian CA in May 2020. Please call @ [phone].    
Pt called to get information about a possible appointment. Pt states has a history of ovarian cancer since May 2020. Dr At [facility] Dr Norma Ahmadi  With [facility] did her surgery and then she went to see Dr Douglass at [facility] for the Chemo cause she did not want to go to [place]. Pt states she has been seeing Dr Douglass and now has a elevated  and she is recommending Chemo again. Pt states she has kidney issues from the chemo in the past so she wants someone that will do a integrative approach. Pt states she seen Dr Mike and Dr Aceves's video and would like to be seen.  Advised pt she would need to see our GYN [facility] because she has a history of ovarian cancer. advised pt she would have to see them first and then they would send her to see Dr Mike for integrative.   Gave pt names of our GYN [facility] and pt will think over everything and let us know if she wants to schedule a appointment  
done

## 2023-08-21 ENCOUNTER — OFFICE VISIT (OUTPATIENT)
Dept: GYNECOLOGIC ONCOLOGY | Facility: CLINIC | Age: 68
DRG: 389 | End: 2023-08-21
Payer: MEDICARE

## 2023-08-21 VITALS
SYSTOLIC BLOOD PRESSURE: 161 MMHG | BODY MASS INDEX: 19.37 KG/M2 | DIASTOLIC BLOOD PRESSURE: 72 MMHG | HEART RATE: 78 BPM | WEIGHT: 120 LBS

## 2023-08-21 DIAGNOSIS — Z51.11 ENCOUNTER FOR ANTINEOPLASTIC CHEMOTHERAPY: ICD-10-CM

## 2023-08-21 DIAGNOSIS — C56.9 MALIGNANT NEOPLASM OF OVARY, UNSPECIFIED LATERALITY: Primary | ICD-10-CM

## 2023-08-21 PROCEDURE — 99999 PR PBB SHADOW E&M-EST. PATIENT-LVL III: ICD-10-PCS | Mod: PBBFAC,,, | Performed by: OBSTETRICS & GYNECOLOGY

## 2023-08-21 PROCEDURE — 99213 OFFICE O/P EST LOW 20 MIN: CPT | Mod: PBBFAC | Performed by: OBSTETRICS & GYNECOLOGY

## 2023-08-21 PROCEDURE — 99999 PR PBB SHADOW E&M-EST. PATIENT-LVL III: CPT | Mod: PBBFAC,,, | Performed by: OBSTETRICS & GYNECOLOGY

## 2023-08-21 PROCEDURE — 99215 OFFICE O/P EST HI 40 MIN: CPT | Mod: S$PBB,,, | Performed by: OBSTETRICS & GYNECOLOGY

## 2023-08-21 PROCEDURE — 99215 PR OFFICE/OUTPT VISIT, EST, LEVL V, 40-54 MIN: ICD-10-PCS | Mod: S$PBB,,, | Performed by: OBSTETRICS & GYNECOLOGY

## 2023-08-21 NOTE — PROGRESS NOTES
Subjective:      Patient ID: Najma Hung is a 68 y.o. female.    Chief Complaint: Chemotherapy    Treatment History  Stage IIIC high-grade serous ovarian cancer  Xlap/BSO/Omentectomy/Pelvic and PA nodes/Peritoneal stripping and debulking 6/20 by Dr. Olivier  T/C x 2, then Carbo x 3 due to patient intolerance of treatment completed 1/28/21  Elected to not pursue maintenance PARPi  CARIS testing negative  Germline normal, CDX positive  Recurrence documented 5/22, received Carbo + neulasta x 1  No treatment until 11/22, when multifocal progression documented on PET  Doxil/Carbo x 4 completed 4/23  Delayed PARPi therapy until starting Lynparza 200 BID 6/23    HPI  Her today for continued f/u.  CA-125 continues to increase.  Also having some GI symptoms.  Review of Systems   Constitutional:  Negative for activity change, appetite change, chills, fatigue and fever.   HENT:  Negative for hearing loss, mouth sores, nosebleeds, sore throat and tinnitus.    Eyes:  Negative for visual disturbance.   Respiratory:  Negative for cough, chest tightness, shortness of breath and wheezing.    Cardiovascular:  Negative for chest pain and leg swelling.   Gastrointestinal:  Negative for abdominal distention, abdominal pain, blood in stool, constipation, diarrhea, nausea and vomiting.   Genitourinary:  Negative for dysuria, flank pain, frequency, hematuria, pelvic pain, vaginal bleeding, vaginal discharge and vaginal pain.   Musculoskeletal:  Negative for arthralgias and back pain.   Skin:  Negative for rash.   Neurological:  Negative for dizziness, seizures, syncope, weakness and numbness.   Hematological:  Does not bruise/bleed easily.   Psychiatric/Behavioral:  Negative for confusion and sleep disturbance. The patient is not nervous/anxious.        Objective:   Physical Exam:   Constitutional: She is oriented to person, place, and time. She appears well-developed and well-nourished. No distress.    HENT:   Head: Normocephalic  and atraumatic.    Eyes: No scleral icterus.     Cardiovascular:       Exam reveals no cyanosis and no edema.        Pulmonary/Chest: Effort normal. No respiratory distress. She exhibits no tenderness.        Abdominal: Soft. She exhibits no distension, no fluid wave and no mass. There is no abdominal tenderness. There is no rebound and no guarding. No hernia.             Musculoskeletal: Normal range of motion and moves all extremeties. No edema.      Lymphadenopathy:     She has no cervical adenopathy.    Neurological: She is alert and oriented to person, place, and time.    Skin: Skin is warm and dry. No cyanosis. No pallor.    Psychiatric: She has a normal mood and affect. Her behavior is normal.       Assessment:     1. Malignant neoplasm of ovary, unspecified laterality    2. Encounter for antineoplastic chemotherapy        Plan:        Ok to hold PARP.  Plan imaging in next couple of weeks.  May need to transition to new regimen.

## 2023-08-23 ENCOUNTER — HOSPITAL ENCOUNTER (INPATIENT)
Facility: OTHER | Age: 68
LOS: 3 days | Discharge: HOME | DRG: 389 | End: 2023-08-26
Attending: EMERGENCY MEDICINE | Admitting: OBSTETRICS & GYNECOLOGY
Payer: MEDICARE

## 2023-08-23 DIAGNOSIS — K56.609 SMALL BOWEL OBSTRUCTION: Primary | ICD-10-CM

## 2023-08-23 PROBLEM — K56.600 PARTIAL SMALL BOWEL OBSTRUCTION: Status: ACTIVE | Noted: 2023-08-23

## 2023-08-23 LAB
ALBUMIN SERPL BCP-MCNC: 4.7 G/DL (ref 3.5–5.2)
ALP SERPL-CCNC: 86 U/L (ref 55–135)
ALT SERPL W/O P-5'-P-CCNC: 15 U/L (ref 10–44)
ANION GAP SERPL CALC-SCNC: 13 MMOL/L (ref 8–16)
AST SERPL-CCNC: 24 U/L (ref 10–40)
BACTERIA #/AREA URNS HPF: ABNORMAL /HPF
BASOPHILS # BLD AUTO: 0.02 K/UL (ref 0–0.2)
BASOPHILS NFR BLD: 0.4 % (ref 0–1.9)
BILIRUB SERPL-MCNC: 0.5 MG/DL (ref 0.1–1)
BILIRUB UR QL STRIP: NEGATIVE
BUN SERPL-MCNC: 16 MG/DL (ref 8–23)
CALCIUM SERPL-MCNC: 10.7 MG/DL (ref 8.7–10.5)
CHLORIDE SERPL-SCNC: 100 MMOL/L (ref 95–110)
CLARITY UR: ABNORMAL
CO2 SERPL-SCNC: 27 MMOL/L (ref 23–29)
COLOR UR: YELLOW
CREAT SERPL-MCNC: 0.9 MG/DL (ref 0.5–1.4)
DIFFERENTIAL METHOD: ABNORMAL
EOSINOPHIL # BLD AUTO: 0 K/UL (ref 0–0.5)
EOSINOPHIL NFR BLD: 0.4 % (ref 0–8)
ERYTHROCYTE [DISTWIDTH] IN BLOOD BY AUTOMATED COUNT: 16.2 % (ref 11.5–14.5)
EST. GFR  (NO RACE VARIABLE): >60 ML/MIN/1.73 M^2
GLUCOSE SERPL-MCNC: 114 MG/DL (ref 70–110)
GLUCOSE UR QL STRIP: NEGATIVE
HCT VFR BLD AUTO: 35.7 % (ref 37–48.5)
HGB BLD-MCNC: 12 G/DL (ref 12–16)
HGB UR QL STRIP: ABNORMAL
IMM GRANULOCYTES # BLD AUTO: 0.01 K/UL (ref 0–0.04)
IMM GRANULOCYTES NFR BLD AUTO: 0.2 % (ref 0–0.5)
KETONES UR QL STRIP: ABNORMAL
LEUKOCYTE ESTERASE UR QL STRIP: ABNORMAL
LIPASE SERPL-CCNC: 13 U/L (ref 4–60)
LYMPHOCYTES # BLD AUTO: 0.5 K/UL (ref 1–4.8)
LYMPHOCYTES NFR BLD: 9.2 % (ref 18–48)
MAGNESIUM SERPL-MCNC: 1.8 MG/DL (ref 1.6–2.6)
MCH RBC QN AUTO: 32.3 PG (ref 27–31)
MCHC RBC AUTO-ENTMCNC: 33.6 G/DL (ref 32–36)
MCV RBC AUTO: 96 FL (ref 82–98)
MICROSCOPIC COMMENT: ABNORMAL
MONOCYTES # BLD AUTO: 0.4 K/UL (ref 0.3–1)
MONOCYTES NFR BLD: 8.6 % (ref 4–15)
NEUTROPHILS # BLD AUTO: 4.1 K/UL (ref 1.8–7.7)
NEUTROPHILS NFR BLD: 81.2 % (ref 38–73)
NITRITE UR QL STRIP: NEGATIVE
NRBC BLD-RTO: 0 /100 WBC
PH UR STRIP: 7 [PH] (ref 5–8)
PHOSPHATE SERPL-MCNC: 3.3 MG/DL (ref 2.7–4.5)
PLATELET # BLD AUTO: 201 K/UL (ref 150–450)
PMV BLD AUTO: 9.1 FL (ref 9.2–12.9)
POTASSIUM SERPL-SCNC: 4 MMOL/L (ref 3.5–5.1)
PROT SERPL-MCNC: 8.1 G/DL (ref 6–8.4)
PROT UR QL STRIP: NEGATIVE
RBC # BLD AUTO: 3.72 M/UL (ref 4–5.4)
RBC #/AREA URNS HPF: 1 /HPF (ref 0–4)
SODIUM SERPL-SCNC: 140 MMOL/L (ref 136–145)
SP GR UR STRIP: 1.01 (ref 1–1.03)
SQUAMOUS #/AREA URNS HPF: 2 /HPF
URN SPEC COLLECT METH UR: ABNORMAL
UROBILINOGEN UR STRIP-ACNC: NEGATIVE EU/DL
WBC # BLD AUTO: 4.99 K/UL (ref 3.9–12.7)
WBC #/AREA URNS HPF: 14 /HPF (ref 0–5)

## 2023-08-23 PROCEDURE — 11000001 HC ACUTE MED/SURG PRIVATE ROOM

## 2023-08-23 PROCEDURE — 85025 COMPLETE CBC W/AUTO DIFF WBC: CPT | Performed by: EMERGENCY MEDICINE

## 2023-08-23 PROCEDURE — 87086 URINE CULTURE/COLONY COUNT: CPT | Performed by: EMERGENCY MEDICINE

## 2023-08-23 PROCEDURE — 63600175 PHARM REV CODE 636 W HCPCS: Performed by: EMERGENCY MEDICINE

## 2023-08-23 PROCEDURE — 83690 ASSAY OF LIPASE: CPT | Performed by: EMERGENCY MEDICINE

## 2023-08-23 PROCEDURE — 99222 PR INITIAL HOSPITAL CARE,LEVL II: ICD-10-PCS | Mod: ,,, | Performed by: STUDENT IN AN ORGANIZED HEALTH CARE EDUCATION/TRAINING PROGRAM

## 2023-08-23 PROCEDURE — 80053 COMPREHEN METABOLIC PANEL: CPT | Performed by: EMERGENCY MEDICINE

## 2023-08-23 PROCEDURE — 96361 HYDRATE IV INFUSION ADD-ON: CPT

## 2023-08-23 PROCEDURE — 99222 1ST HOSP IP/OBS MODERATE 55: CPT | Mod: ,,, | Performed by: STUDENT IN AN ORGANIZED HEALTH CARE EDUCATION/TRAINING PROGRAM

## 2023-08-23 PROCEDURE — 96375 TX/PRO/DX INJ NEW DRUG ADDON: CPT

## 2023-08-23 PROCEDURE — 96374 THER/PROPH/DIAG INJ IV PUSH: CPT

## 2023-08-23 PROCEDURE — 96376 TX/PRO/DX INJ SAME DRUG ADON: CPT

## 2023-08-23 PROCEDURE — 83735 ASSAY OF MAGNESIUM: CPT | Performed by: GENERAL PRACTICE

## 2023-08-23 PROCEDURE — 25000003 PHARM REV CODE 250: Performed by: EMERGENCY MEDICINE

## 2023-08-23 PROCEDURE — 25000003 PHARM REV CODE 250: Performed by: GENERAL PRACTICE

## 2023-08-23 PROCEDURE — 81000 URINALYSIS NONAUTO W/SCOPE: CPT | Performed by: EMERGENCY MEDICINE

## 2023-08-23 PROCEDURE — 84100 ASSAY OF PHOSPHORUS: CPT | Performed by: GENERAL PRACTICE

## 2023-08-23 PROCEDURE — 99285 EMERGENCY DEPT VISIT HI MDM: CPT | Mod: 25

## 2023-08-23 PROCEDURE — 63600175 PHARM REV CODE 636 W HCPCS: Performed by: GENERAL PRACTICE

## 2023-08-23 RX ORDER — HYDRALAZINE HYDROCHLORIDE 20 MG/ML
10 INJECTION INTRAMUSCULAR; INTRAVENOUS EVERY 6 HOURS PRN
Status: DISCONTINUED | OUTPATIENT
Start: 2023-08-23 | End: 2023-08-26 | Stop reason: HOSPADM

## 2023-08-23 RX ORDER — LIDOCAINE 50 MG/G
1 PATCH TOPICAL
Status: DISCONTINUED | OUTPATIENT
Start: 2023-08-23 | End: 2023-08-26 | Stop reason: HOSPADM

## 2023-08-23 RX ORDER — PROCHLORPERAZINE EDISYLATE 5 MG/ML
5 INJECTION INTRAMUSCULAR; INTRAVENOUS EVERY 6 HOURS PRN
Status: DISCONTINUED | OUTPATIENT
Start: 2023-08-23 | End: 2023-08-23

## 2023-08-23 RX ORDER — HYDROMORPHONE HYDROCHLORIDE 1 MG/ML
1 INJECTION, SOLUTION INTRAMUSCULAR; INTRAVENOUS; SUBCUTANEOUS
Status: COMPLETED | OUTPATIENT
Start: 2023-08-23 | End: 2023-08-23

## 2023-08-23 RX ORDER — HYDROMORPHONE HYDROCHLORIDE 1 MG/ML
0.5 INJECTION, SOLUTION INTRAMUSCULAR; INTRAVENOUS; SUBCUTANEOUS
Status: COMPLETED | OUTPATIENT
Start: 2023-08-23 | End: 2023-08-23

## 2023-08-23 RX ORDER — MAGNESIUM SULFATE HEPTAHYDRATE 40 MG/ML
2 INJECTION, SOLUTION INTRAVENOUS ONCE
Status: COMPLETED | OUTPATIENT
Start: 2023-08-23 | End: 2023-08-23

## 2023-08-23 RX ORDER — SODIUM CHLORIDE, SODIUM LACTATE, POTASSIUM CHLORIDE, CALCIUM CHLORIDE 600; 310; 30; 20 MG/100ML; MG/100ML; MG/100ML; MG/100ML
INJECTION, SOLUTION INTRAVENOUS CONTINUOUS
Status: DISCONTINUED | OUTPATIENT
Start: 2023-08-23 | End: 2023-08-26

## 2023-08-23 RX ORDER — PROCHLORPERAZINE EDISYLATE 5 MG/ML
5 INJECTION INTRAMUSCULAR; INTRAVENOUS EVERY 6 HOURS PRN
Status: DISCONTINUED | OUTPATIENT
Start: 2023-08-23 | End: 2023-08-26 | Stop reason: HOSPADM

## 2023-08-23 RX ORDER — HYDROMORPHONE HYDROCHLORIDE 1 MG/ML
1 INJECTION, SOLUTION INTRAMUSCULAR; INTRAVENOUS; SUBCUTANEOUS
Status: DISCONTINUED | OUTPATIENT
Start: 2023-08-23 | End: 2023-08-23

## 2023-08-23 RX ORDER — SODIUM CHLORIDE 0.9 % (FLUSH) 0.9 %
10 SYRINGE (ML) INJECTION
Status: DISCONTINUED | OUTPATIENT
Start: 2023-08-23 | End: 2023-08-23

## 2023-08-23 RX ORDER — SODIUM CHLORIDE, SODIUM LACTATE, POTASSIUM CHLORIDE, CALCIUM CHLORIDE 600; 310; 30; 20 MG/100ML; MG/100ML; MG/100ML; MG/100ML
1000 INJECTION, SOLUTION INTRAVENOUS
Status: DISCONTINUED | OUTPATIENT
Start: 2023-08-23 | End: 2023-08-23

## 2023-08-23 RX ORDER — SODIUM CHLORIDE 0.9 % (FLUSH) 0.9 %
10 SYRINGE (ML) INJECTION
Status: DISCONTINUED | OUTPATIENT
Start: 2023-08-23 | End: 2023-08-26 | Stop reason: HOSPADM

## 2023-08-23 RX ORDER — DEXAMETHASONE SODIUM PHOSPHATE 4 MG/ML
4 INJECTION, SOLUTION INTRA-ARTICULAR; INTRALESIONAL; INTRAMUSCULAR; INTRAVENOUS; SOFT TISSUE
Status: DISCONTINUED | OUTPATIENT
Start: 2023-08-23 | End: 2023-08-24

## 2023-08-23 RX ORDER — ENOXAPARIN SODIUM 100 MG/ML
40 INJECTION SUBCUTANEOUS EVERY 24 HOURS
Status: DISCONTINUED | OUTPATIENT
Start: 2023-08-23 | End: 2023-08-26 | Stop reason: HOSPADM

## 2023-08-23 RX ORDER — SODIUM CHLORIDE 9 MG/ML
1000 INJECTION, SOLUTION INTRAVENOUS
Status: COMPLETED | OUTPATIENT
Start: 2023-08-23 | End: 2023-08-23

## 2023-08-23 RX ORDER — ONDANSETRON 2 MG/ML
4 INJECTION INTRAMUSCULAR; INTRAVENOUS EVERY 6 HOURS PRN
Status: DISCONTINUED | OUTPATIENT
Start: 2023-08-23 | End: 2023-08-26 | Stop reason: HOSPADM

## 2023-08-23 RX ORDER — METOCLOPRAMIDE HYDROCHLORIDE 5 MG/ML
5 INJECTION INTRAMUSCULAR; INTRAVENOUS EVERY 6 HOURS PRN
Status: DISCONTINUED | OUTPATIENT
Start: 2023-08-23 | End: 2023-08-23

## 2023-08-23 RX ORDER — HYDROMORPHONE HYDROCHLORIDE 1 MG/ML
0.5 INJECTION, SOLUTION INTRAMUSCULAR; INTRAVENOUS; SUBCUTANEOUS EVERY 6 HOURS PRN
Status: DISCONTINUED | OUTPATIENT
Start: 2023-08-23 | End: 2023-08-26 | Stop reason: HOSPADM

## 2023-08-23 RX ORDER — ONDANSETRON 2 MG/ML
4 INJECTION INTRAMUSCULAR; INTRAVENOUS ONCE
Status: COMPLETED | OUTPATIENT
Start: 2023-08-23 | End: 2023-08-23

## 2023-08-23 RX ORDER — ACETAMINOPHEN 650 MG/1
650 SUPPOSITORY RECTAL EVERY 6 HOURS PRN
Status: DISCONTINUED | OUTPATIENT
Start: 2023-08-23 | End: 2023-08-26 | Stop reason: HOSPADM

## 2023-08-23 RX ADMIN — ACETAMINOPHEN 650 MG: 650 SUPPOSITORY RECTAL at 11:08

## 2023-08-23 RX ADMIN — HYDROMORPHONE HYDROCHLORIDE 1 MG: 1 INJECTION, SOLUTION INTRAMUSCULAR; INTRAVENOUS; SUBCUTANEOUS at 07:08

## 2023-08-23 RX ADMIN — SODIUM CHLORIDE, POTASSIUM CHLORIDE, SODIUM LACTATE AND CALCIUM CHLORIDE: 600; 310; 30; 20 INJECTION, SOLUTION INTRAVENOUS at 05:08

## 2023-08-23 RX ADMIN — MAGNESIUM SULFATE HEPTAHYDRATE 2 G: 40 INJECTION, SOLUTION INTRAVENOUS at 10:08

## 2023-08-23 RX ADMIN — HYDRALAZINE HYDROCHLORIDE 10 MG: 20 INJECTION INTRAMUSCULAR; INTRAVENOUS at 08:08

## 2023-08-23 RX ADMIN — HYDRALAZINE HYDROCHLORIDE 10 MG: 20 INJECTION INTRAMUSCULAR; INTRAVENOUS at 05:08

## 2023-08-23 RX ADMIN — ONDANSETRON 4 MG: 2 INJECTION INTRAMUSCULAR; INTRAVENOUS at 06:08

## 2023-08-23 RX ADMIN — SODIUM CHLORIDE 1000 ML: 9 INJECTION, SOLUTION INTRAVENOUS at 04:08

## 2023-08-23 RX ADMIN — PROCHLORPERAZINE EDISYLATE 5 MG: 5 INJECTION, SOLUTION INTRAMUSCULAR; INTRAVENOUS at 08:08

## 2023-08-23 RX ADMIN — HYDROMORPHONE HYDROCHLORIDE 0.5 MG: 0.5 INJECTION, SOLUTION INTRAMUSCULAR; INTRAVENOUS; SUBCUTANEOUS at 04:08

## 2023-08-23 RX ADMIN — ENOXAPARIN SODIUM 40 MG: 40 INJECTION SUBCUTANEOUS at 05:08

## 2023-08-23 RX ADMIN — SODIUM CHLORIDE, POTASSIUM CHLORIDE, SODIUM LACTATE AND CALCIUM CHLORIDE: 600; 310; 30; 20 INJECTION, SOLUTION INTRAVENOUS at 10:08

## 2023-08-23 RX ADMIN — HYDROMORPHONE HYDROCHLORIDE 1 MG: 1 INJECTION, SOLUTION INTRAMUSCULAR; INTRAVENOUS; SUBCUTANEOUS at 05:08

## 2023-08-23 RX ADMIN — ONDANSETRON 4 MG: 2 INJECTION INTRAMUSCULAR; INTRAVENOUS at 08:08

## 2023-08-23 RX ADMIN — LIDOCAINE 1 PATCH: 50 PATCH CUTANEOUS at 04:08

## 2023-08-23 NOTE — ED PROVIDER NOTES
Encounter Date: 8/23/2023    SCRIBE #1 NOTE: I, Dona Marrufo, am scribing for, and in the presence of,  Roseann Batista MD.       History     Chief Complaint   Patient presents with    Abdominal Pain     C/o intermittent lower abdominal pain for awhile. +nausea. Pt reports taking zofran & tylenol at home. -urinary complaints -vomit/diarrhea      Najma Hung is a 68 y.o. female, with a PMHx of IBS, CKD, ovarian cancer who presents to the ED for evaluation of lower abdominal pain. Describes her pain as a cramping sensation. Progression is waxing and waning. Per , states she typically has mild abdominal pain which she attributes to oral chemotherapy. He states they decided to stop oral chemotherapy 2 days ago. Her pain was acutely exacerbated earlier this evening after going on a walk. She states her current pain feels similar to her admission last month on 7/21/23. Associated symptoms include mild nausea and abdominal distension, which she states also occurs everyday and progressively worsens throughout the day, but is more significant today. Denies vomiting or diarrhea. Her last BM was this morning.  She additionally reports left chest wall pain that significantly worsens with movement, coughing, and sitting up. She attributes this pain to pulling a muscle when she was doing yoga several days ago. She has taken Tylenol at home for this pain. This is the extent of the patient's complaints at this time.    The history is provided by the patient.     Review of patient's allergies indicates:   Allergen Reactions    Adhesive Dermatitis and Swelling     Skin adhesive (skinaffix)    Iodinated contrast media Other (See Comments), Diarrhea and Shortness Of Breath    Iodine Other (See Comments)     FAINTING, renal issues, Bilateral flank spasms  IODINE IN CONTRAST    Chamomile flower Hives    Codeine     Dyclonine Hives    Mold Other (See Comments)    Pcn [penicillins]     Pennyroyal oil Other (See Comments)     Peppermint Other (See Comments)    Diphenhydramine hcl Rash     Sometimes rash     Past Medical History:   Diagnosis Date    CKD (chronic kidney disease)     IBS (irritable bowel syndrome)     OAB (overactive bladder)     Ovarian cancer 2020    stage iv metastatic     Past Surgical History:   Procedure Laterality Date    HYSTERECTOMY      age 28    WISDOM TOOTH EXTRACTION       Family History   Problem Relation Age of Onset    Vaginal cancer Neg Hx     Endometrial cancer Neg Hx     Cervical cancer Neg Hx     Breast cancer Neg Hx      Social History     Tobacco Use    Smoking status: Never    Smokeless tobacco: Never   Substance Use Topics    Alcohol use: Never    Drug use: Never     Review of Systems   Constitutional:  Negative for chills and fever.   HENT:  Negative for congestion and sore throat.    Eyes:  Negative for visual disturbance.   Respiratory:  Negative for cough and shortness of breath.    Cardiovascular:  Positive for chest pain. Negative for palpitations.   Gastrointestinal:  Positive for abdominal pain and nausea. Negative for diarrhea and vomiting.   Genitourinary:  Negative for decreased urine volume, dysuria and vaginal discharge.   Musculoskeletal:  Negative for joint swelling, neck pain and neck stiffness.   Skin:  Negative for rash and wound.   Neurological:  Negative for weakness, numbness and headaches.   Psychiatric/Behavioral:  Negative for confusion.        Physical Exam     Initial Vitals [08/23/23 0320]   BP Pulse Resp Temp SpO2   (!) 142/64 86 18 97.6 °F (36.4 °C) 100 %      MAP       --         Physical Exam    Constitutional: She appears well-developed and well-nourished. She does not have a sickly appearance. No distress.   HENT:   Head: Normocephalic and atraumatic.   Right Ear: External ear normal.   Left Ear: External ear normal.   Dry mucous membranes.   Eyes: Conjunctivae, EOM and lids are normal. Right eye exhibits no discharge. Left eye exhibits no discharge. Right conjunctiva  is not injected. Right conjunctiva has no hemorrhage. Left conjunctiva is not injected. Left conjunctiva has no hemorrhage. No scleral icterus.   Neck: Phonation normal. No stridor present. No tracheal deviation present.   Normal range of motion.  Cardiovascular:  Normal rate, regular rhythm and normal heart sounds.     Exam reveals no friction rub.       No murmur heard.  Pulses:       Radial pulses are 2+ on the right side and 2+ on the left side.        Dorsalis pedis pulses are 2+ on the right side and 2+ on the left side.   Pulmonary/Chest: Breath sounds normal. No respiratory distress. She has no wheezes. She has no rhonchi. She has no rales.   Abdominal: She exhibits distension. There is abdominal tenderness.   Abdomen distended. Diffuse abdominal tenderness. Decreased bowel sounds. There is no rebound and no guarding.   Musculoskeletal:      Cervical back: Normal range of motion.     Neurological: She is alert and oriented to person, place, and time. She has normal strength. GCS eye subscore is 4. GCS verbal subscore is 5. GCS motor subscore is 6.   Skin: Skin is warm.   Psychiatric: She has a normal mood and affect. Her speech is normal and behavior is normal. Judgment and thought content normal. Cognition and memory are normal.         ED Course   Procedures  Labs Reviewed   CBC W/ AUTO DIFFERENTIAL - Abnormal; Notable for the following components:       Result Value    RBC 3.72 (*)     Hematocrit 35.7 (*)     MCH 32.3 (*)     RDW 16.2 (*)     MPV 9.1 (*)     Lymph # 0.5 (*)     Gran % 81.2 (*)     Lymph % 9.2 (*)     All other components within normal limits   COMPREHENSIVE METABOLIC PANEL - Abnormal; Notable for the following components:    Glucose 114 (*)     Calcium 10.7 (*)     All other components within normal limits   URINALYSIS, REFLEX TO URINE CULTURE - Abnormal; Notable for the following components:    Appearance, UA Hazy (*)     Ketones, UA 1+ (*)     Occult Blood UA Trace (*)     Leukocytes,  UA 2+ (*)     All other components within normal limits    Narrative:     Specimen Source->Urine   URINALYSIS MICROSCOPIC - Abnormal; Notable for the following components:    WBC, UA 14 (*)     Bacteria Few (*)     All other components within normal limits    Narrative:     Specimen Source->Urine   CULTURE, URINE   LIPASE          Imaging Results               CT Abdomen Pelvis  Without Contrast (Final result)  Result time 08/23/23 05:18:54      Final result by Memo Meehan MD (08/23/23 05:18:54)                   Impression:      Abnormal small bowel pattern consistent with small bowel obstructive process, concerning for high-grade partial or complete obstruction, as discussed above.    Findings concerning for pulmonary and hepatic metastatic disease, as discussed above.    Small pleural effusion at the right lung base.    Urinary bladder wall thickening with perivesicular haziness, correlation for UTI/cystitis is needed.    Additional findings as above.    This report was flagged in Epic as abnormal.      Electronically signed by: Memo Meehan  Date:    08/23/2023  Time:    05:18               Narrative:    EXAMINATION:  CT ABDOMEN PELVIS WITHOUT CONTRAST    CLINICAL HISTORY:  Abdominal pain, acute, nonlocalized;    TECHNIQUE:  Low dose axial images, sagittal and coronal reformations were obtained from the lung bases to the pubic symphysis.  Intravenous contrast and oral contrast was not utilized, this diminishes the sensitivity of the examination.    COMPARISON:  CT examination of the abdomen and pelvis July 21, 2023    FINDINGS:  There is a small pleural effusion at the right lung base.  There are multiple small pulmonary nodules at the lung bases, this may relate to intrathoracic metastatic disease.    There is a small hiatal hernia noted.  The stomach demonstrates nonspecific appearance of moderate distention with fluid, air and ingested material.  There are dilated small bowel loops throughout the  abdomen and pelvis with appearance of interloop edema and inflammatory change.  The distal small bowel including the terminal ileum appears decompressed.  It is difficult to delineate the specific point of transition, however thought likely within the central abdomen as seen on axial images 82 through 105.  In the central abdominal mesentery at this level there are multiple prominent lymph nodes noted as well.  Given the transition from prominently dilated small bowel to decompressed distal small bowel the findings are concerning for high-grade partial or complete obstruction.  There is no evidence for pneumatosis, there is no portal venous air, mesenteric venous air or free intraperitoneal air.    The appendix is not identified.  There is mild prominence of the colon throughout its course with air and stool without inflammatory or obstructive pattern of the colon.    Hepatic hypodensities are noted, some of which may relate to cysts although not well evaluated on this examination there are additional areas of ill-defined hypodensity that are concerning for hepatic metastatic disease.    There is no evidence for acute process of the pancreas, spleen or adrenal glands.  There is no evidence for hydronephrosis or obstructive uropathy or perinephric inflammatory change bilaterally.  The abdominal aorta demonstrates atherosclerotic change, appears normal in caliber otherwise not optimally evaluated on this exam.  The urinary bladder is decompressed, wall thickening greater than expected for decompressed appearance is noted, correlation for UTI/cystitis is needed.    The osseous structures demonstrate chronic change, there is diminished mineralization and degenerative change noted.                                       Medications   LIDOcaine 5 % patch 1 patch (1 patch Transdermal Patch Applied 8/23/23 1237)   lactated ringers infusion (has no administration in time range)   0.9%  NaCl infusion (0 mLs Intravenous Stopped  8/23/23 0525)   HYDROmorphone injection 0.5 mg (0.5 mg Intravenous Given 8/23/23 0428)   HYDROmorphone injection 1 mg (1 mg Intravenous Given 8/23/23 0524)     Medical Decision Making  Amount and/or Complexity of Data Reviewed  External Data Reviewed: labs.  Labs: ordered.  Radiology: ordered and independent interpretation performed.    Risk  Prescription drug management.      Additional MDM:   Comments: 68-year-old female with history of ovarian cancer presents afebrile, hemodynamically stable and well-appearing with mid abdominal cramping similar to her admission last month.  On exam her abdomen is distended but soft with diffuse tenderness.  CBC with no leukocytosis.  H&H within normal limits.  CMP without significant abnormalities.  CT abdomen pelvis without contrast consistent with SBO (high-grade partial versus complete obstruction) with transition point in the central abdomen.  UTI also concerning for pulmonary and hepatic metastatic disease.  Gyn Onc on-call consulted for admission will evaluate the patient in the emergency department..        Scribe Attestation:   Scribe #1: I performed the above scribed service and the documentation accurately describes the services I performed. I attest to the accuracy of the note.      I, Roseann Batista  , personally performed the services described in this documentation. All medical record entries made by the scribe were at my direction and in my presence. I have reviewed the chart and agree that the record reflects my personal performance and is accurate and complete.                      Clinical Impression:   Final diagnoses:  [K56.609] Small bowel obstruction (Primary)        ED Disposition Condition    Observation Stable                Roseann Batista MD  08/23/23 0536

## 2023-08-23 NOTE — H&P
[facility] Emergency Dept  Gynecologic Oncology  H&P    Patient Name: Najma Hung  MRN: [id number]  Admission Date: 2023  Primary Care Provider: Neymar Cash III, MD   Principal Problem: <principal problem not specified>    Subjective:     Chief Complaint/Reason for Admission: Abdominal pain    History of Present Illness:   Najma Hung is a 68 y.o. 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 Patient states she has been having chronic abdominal pain for the past 3-4 months while on chemotherapy but reports over the past few days it has gotten worse. She has not taken her Lynparaza in the last two days. She states the pain is similar to when she was admitted last month (2023-2023). She has tried taking tylenol and Zofran with no relief. Patient endorses having a BM at home. She reports vomiting while in the ED but was not having any previous episodes of vomiting. She denies diarrhea or constipation.    She also reports left chest wall pain from pulling a muscle while doing yoga. She has been taking Tylenol with minimal relief.           Oncology History:   2020 Imaging Significant Finding   Pelvic US with multicystic enlarged ovaries bilaterally, largest 8cm, some with mural nodularity.     2020 Imaging Significant Finding   CT A/P with large (12.4 x 9.2 cm), complex, multi cystic mass in pelvis with mural nodularity and numerous septations. Numerous hypodensities in liver, favor hepatic cysts.     2020- Elap, BSO, radical cytoreduction, supracolic omentectomy, bilateral ureterolysis, staging biopsies, bilateral pelvic/para-aortic LAD, striping of pelvic/bladder peritoneum, and cystoscopy with Dr. Olivier. 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 Ovarian surface ruptured bilaterally. LVSI in right ovary. Metastatic disease in pelvic  "peritoneum, omentum, and 2/8 pelvic lymph nodes.     CARUS testing 7/25/20 specimen: HER2/Adrian negative (0), HRD negative, KRAS amplified, TP53 pathogenic variant, Microstellite stable, Mmr proficient, TMB low, BRCA1 and BRCA2 wildtype, ER negative, FOLR1 negative, PDL1 negative     -46. (Prior to chemo)       8/10/2020- 1/28/2021 chemotherapy.   8/20/2020- Course #1 of Carbo/Taxol   9/10/20- Course #2 Carbo/Taxotere  10/15/2020 - unable to tolerate combination chemo; changed to single agent carboplatin  2/10/2020 - dose reduce carboplatin to 90% - course #4 carboplatin  1/28/2021- dose reduce carboplatin to 80%; course #5 carboplatin (delayed due to GI issues)     9/8/2020- [facility] My Risk genetic testing, NEGATIVE     -97.6 (completion of chemo)      4/19/21- PET scan showed:  No evidence for FDG avid metastasis or recurrence.     5/5/21- Patient decided not to go on PARP     5/17/21- [facility] myCTeleport CDx testing: POSITIVE     11/29/21-  = 70.7     12/15/21- PET scan showed:  No evidence for FDG avid metastasis or recurrence     2/22: -776      2/17/22- CT abdomen and pelvis showed:  1. Multiple subcentimeter hypodensities throughout the liver suspicious for multifocal hepatic metastatic disease.  **I reviewed the CT scan from [facility] dated 6/2020 and compared it to [facility] scan dated 2/2022 with radiologist Dr. Soares. There are virtually the same numerous hepatic cysts seen in 6/2020 - 5-7 cysts all under 10mm, each cyst measures within 1-2 mm compared to the cysts diameter on current scan. These are "stable" cysts over 1+ years suggesting likely benign nature. Also, PET scan from Dec 2021 showed no FDG avid lesions. Najma decided to postpone liver biopsy and repeat  in 1 month      continued to rise 177>241.3      5/25/2022:  continues to rise >200. Decided to proceed with re-treatment with single agent carboplatin. Course #1 carboplatin with neulasta     8/22 PET: No " evidence of FDG met. disease. liver lesion not FDG avid.  Indeterminate foci of FDG in lower pelvis.       355 (8/2022) >1155 (11/22)      11/22 PET: Interval development of FDG avid pericapsular hepatic and splenic implants as well as FDG avid peritoneal and mesenteric soft tissue metastatic deposits.      12/2022- Doxil and Carbo.     4/23 PET- Interval positive response to treatment. Decrease and resolution of some of the mesenteric and peritoneal implants. Decreased FDG activity of hepatic pericapsular subcapsular metastatic disease. Resolution of previously seen. Splenic implant      4/23: -980      6/1/2023 Began Lynparza 200mg BID      6/2023:-445      6/28/2023: - 513      7/23- PET: 1. Progression of disease with increased degree of hypermetabolic activity involving the known serosal implants as well as new serosal implant adjacent to the spleen.  2. Concern for new metastatic nodule within the middle lobe     Oncology Treatment Plan:   Lynparza BID     Past Medical History:   Diagnosis Date    CKD (chronic kidney disease)     IBS (irritable bowel syndrome)     OAB (overactive bladder)     Ovarian cancer 2020    stage iv metastatic     Past Surgical History:   Procedure Laterality Date    HYSTERECTOMY      age 28    WISDOM TOOTH EXTRACTION       Family History    None       Tobacco Use    Smoking status: Never    Smokeless tobacco: Never   Substance and Sexual Activity    Alcohol use: Never    Drug use: Never    Sexual activity: Yes       (Not in a hospital admission)      Review of patient's allergies indicates:   Allergen Reactions    Adhesive Dermatitis and Swelling     Skin adhesive (skinaffix)    Iodinated contrast media Other (See Comments), Diarrhea and Shortness Of Breath    Iodine Other (See Comments)     FAINTING, renal issues, Bilateral flank spasms  IODINE IN CONTRAST    Chamomile flower Hives    Codeine     Dyclonine Hives    Mold Other (See Comments)    Pcn [penicillins]      Pennyroyal oil Other (See Comments)    Peppermint Other (See Comments)    Diphenhydramine hcl Rash     Sometimes rash       Review of Systems   Constitutional:  Negative for chills and fever.   HENT:  Negative for nasal congestion.    Eyes:  Negative for visual disturbance.   Respiratory:  Negative for cough and shortness of breath.    Cardiovascular:  Negative for chest pain.   Gastrointestinal:  Positive for abdominal pain, nausea and vomiting. Negative for constipation and diarrhea.   Genitourinary:  Negative for dysuria, frequency, urgency, vaginal bleeding and vaginal discharge.   Musculoskeletal:  Positive for myalgias.   Integumentary:  Negative for rash.   Neurological:  Negative for headaches.   Hematological:  Does not bruise/bleed easily.   Psychiatric/Behavioral:  The patient is not nervous/anxious.       Objective:     Vital Signs (Most Recent):  Temp: 97.6 °F (36.4 °C) (08/23/23 0603)  Pulse: 89 (08/23/23 0603)  Resp: 16 (08/23/23 0603)  BP: (!) 217/104 (08/23/23 0603)  SpO2: 96 % (08/23/23 0603) Vital Signs (24h Range):  Temp:  [97.6 °F (36.4 °C)] 97.6 °F (36.4 °C)  Pulse:  [86-89] 89  Resp:  [16-18] 16  SpO2:  [96 %-100 %] 96 %  BP: (142-217)/() 217/104        There is no height or weight on file to calculate BMI.    Physical Exam:   Constitutional: She is oriented to person, place, and time. She appears well-developed and well-nourished.    HENT:   Head: Normocephalic and atraumatic.    Eyes: EOM are normal.     Cardiovascular:  Normal rate.             Pulmonary/Chest: Effort normal. No respiratory distress.        Abdominal: She exhibits distension. Bowel sounds are decreased. There is abdominal tenderness. There is no rebound and no guarding.                 Neurological: She is alert and oriented to person, place, and time.    Skin: Skin is warm and dry.    Psychiatric: She has a normal mood and affect. Her behavior is normal. Thought content normal.       Laboratory:  All pertinent  labs from the last 24 hours have been reviewed.    Diagnostic Results:  CT: Reviewed    Imaging Results               CT Abdomen Pelvis  Without Contrast (Final result)  Result time 08/23/23 05:18:54      Final result by Memo Meehan MD (08/23/23 05:18:54)                   Impression:      Abnormal small bowel pattern consistent with small bowel obstructive process, concerning for high-grade partial or complete obstruction, as discussed above.    Findings concerning for pulmonary and hepatic metastatic disease, as discussed above.    Small pleural effusion at the right lung base.    Urinary bladder wall thickening with perivesicular haziness, correlation for UTI/cystitis is needed.    Additional findings as above.    This report was flagged in Epic as abnormal.      Electronically signed by: Memo Meehan  Date:    08/23/2023  Time:    05:18               Narrative:    EXAMINATION:  CT ABDOMEN PELVIS WITHOUT CONTRAST    CLINICAL HISTORY:  Abdominal pain, acute, nonlocalized;    TECHNIQUE:  Low dose axial images, sagittal and coronal reformations were obtained from the lung bases to the pubic symphysis.  Intravenous contrast and oral contrast was not utilized, this diminishes the sensitivity of the examination.    COMPARISON:  CT examination of the abdomen and pelvis July 21, 2023    FINDINGS:  There is a small pleural effusion at the right lung base.  There are multiple small pulmonary nodules at the lung bases, this may relate to intrathoracic metastatic disease.    There is a small hiatal hernia noted.  The stomach demonstrates nonspecific appearance of moderate distention with fluid, air and ingested material.  There are dilated small bowel loops throughout the abdomen and pelvis with appearance of interloop edema and inflammatory change.  The distal small bowel including the terminal ileum appears decompressed.  It is difficult to delineate the specific point of transition, however thought likely within  the central abdomen as seen on axial images 82 through 105.  In the central abdominal mesentery at this level there are multiple prominent lymph nodes noted as well.  Given the transition from prominently dilated small bowel to decompressed distal small bowel the findings are concerning for high-grade partial or complete obstruction.  There is no evidence for pneumatosis, there is no portal venous air, mesenteric venous air or free intraperitoneal air.    The appendix is not identified.  There is mild prominence of the colon throughout its course with air and stool without inflammatory or obstructive pattern of the colon.    Hepatic hypodensities are noted, some of which may relate to cysts although not well evaluated on this examination there are additional areas of ill-defined hypodensity that are concerning for hepatic metastatic disease.    There is no evidence for acute process of the pancreas, spleen or adrenal glands.  There is no evidence for hydronephrosis or obstructive uropathy or perinephric inflammatory change bilaterally.  The abdominal aorta demonstrates atherosclerotic change, appears normal in caliber otherwise not optimally evaluated on this exam.  The urinary bladder is decompressed, wall thickening greater than expected for decompressed appearance is noted, correlation for UTI/cystitis is needed.    The osseous structures demonstrate chronic change, there is diminished mineralization and degenerative change noted.                                     Assessment/Plan:     Najma Hung is a 68 y.o. 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    Partial SBO  - Plan to admit and place NGT for bowel rest  - CT: Abnormal small bowel pattern consistent with small bowel obstructive process, concerning for high-grade partial or complete obstruction. Findings concerning for pulmonary and hepatic metastatic  disease. Small pleural effusion at the right lung base.  - Antiemetics: Zofran IV PRN, Compazine IV PRN  - Diet NPO +  cc/hr  - CBC stable  - CMP: K 4.0, Cr 0.9, Mg and Phos ordered   - Pain control: Dilaudid PRN  - Daily CMP/Mg/Phos: Will replace electrolytes PRN with goal K >4.0, Mg > 2,  Phos >3.      Hypertension (not on medication)  - Patient hypertensive while vomiting in the ED. Will Continue to monitor.  - Hydral PRN    Stage IIIB high grade serous ovarian cancer   - See Gyn Oncology history above  - On Lynparza BID. Will hold at this time.   - Lovenox daily for VTE prophylaxis     CKD 2  - Baseline Cr 0.9.   - Withholding nephrotoxic agents  - Daily CMP    OAB (not on medication)  - Asymptomatic  - Will continue to monitor     Calin Simpson MD  PGY2  Gynecologic Oncology  [facility] - Emergency Dept

## 2023-08-23 NOTE — ED NOTES
Order for 1L LR bolus held at this time per admitting provider at bedside due to pt hypertensive BP readings. Awaiting new orders as provider finishes at bedside of pt.

## 2023-08-23 NOTE — CONSULTS
See H&P for full details. In short, Najma Hung is a 68 y.o. 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 CT showed abnormal small bowel pattern consistent with small bowel obstructive process, concerning for high-grade partial or complete obstruction. Patient will be admitted for SBO. Plan for NGT, NPO+  cc/hr, antiemetics PRN, and electrolytes replacement PRN.     Calin Simpson MD  OBGYN PGY-2

## 2023-08-23 NOTE — ED NOTES
Pt rounding complete.  Pain 8/10.  Restroom and comfort needs addressed.  Pt updated on plan of care.  Call light within reach.  Will continue to monitor.

## 2023-08-24 LAB
ALBUMIN SERPL BCP-MCNC: 3.4 G/DL (ref 3.5–5.2)
ALP SERPL-CCNC: 65 U/L (ref 55–135)
ALT SERPL W/O P-5'-P-CCNC: 13 U/L (ref 10–44)
ANION GAP SERPL CALC-SCNC: 11 MMOL/L (ref 8–16)
AST SERPL-CCNC: 20 U/L (ref 10–40)
BACTERIA UR CULT: NO GROWTH
BILIRUB SERPL-MCNC: 0.6 MG/DL (ref 0.1–1)
BUN SERPL-MCNC: 15 MG/DL (ref 8–23)
CALCIUM SERPL-MCNC: 8.9 MG/DL (ref 8.7–10.5)
CHLORIDE SERPL-SCNC: 103 MMOL/L (ref 95–110)
CO2 SERPL-SCNC: 26 MMOL/L (ref 23–29)
CREAT SERPL-MCNC: 0.7 MG/DL (ref 0.5–1.4)
EST. GFR  (NO RACE VARIABLE): >60 ML/MIN/1.73 M^2
GLUCOSE SERPL-MCNC: 80 MG/DL (ref 70–110)
MAGNESIUM SERPL-MCNC: 1.9 MG/DL (ref 1.6–2.6)
PHOSPHATE SERPL-MCNC: 3.1 MG/DL (ref 2.7–4.5)
POTASSIUM SERPL-SCNC: 3.4 MMOL/L (ref 3.5–5.1)
PROT SERPL-MCNC: 6.2 G/DL (ref 6–8.4)
SODIUM SERPL-SCNC: 140 MMOL/L (ref 136–145)

## 2023-08-24 PROCEDURE — 63600175 PHARM REV CODE 636 W HCPCS: Performed by: GENERAL PRACTICE

## 2023-08-24 PROCEDURE — 84100 ASSAY OF PHOSPHORUS: CPT

## 2023-08-24 PROCEDURE — 99232 SBSQ HOSP IP/OBS MODERATE 35: CPT | Mod: ,,, | Performed by: OBSTETRICS & GYNECOLOGY

## 2023-08-24 PROCEDURE — 25000003 PHARM REV CODE 250: Performed by: GENERAL PRACTICE

## 2023-08-24 PROCEDURE — 11000001 HC ACUTE MED/SURG PRIVATE ROOM

## 2023-08-24 PROCEDURE — 63600175 PHARM REV CODE 636 W HCPCS: Performed by: STUDENT IN AN ORGANIZED HEALTH CARE EDUCATION/TRAINING PROGRAM

## 2023-08-24 PROCEDURE — 83735 ASSAY OF MAGNESIUM: CPT

## 2023-08-24 PROCEDURE — 99232 PR SUBSEQUENT HOSPITAL CARE,LEVL II: ICD-10-PCS | Mod: ,,, | Performed by: OBSTETRICS & GYNECOLOGY

## 2023-08-24 PROCEDURE — 80053 COMPREHEN METABOLIC PANEL: CPT

## 2023-08-24 PROCEDURE — 25000003 PHARM REV CODE 250: Performed by: STUDENT IN AN ORGANIZED HEALTH CARE EDUCATION/TRAINING PROGRAM

## 2023-08-24 RX ORDER — CETIRIZINE HYDROCHLORIDE 10 MG/1
10 TABLET ORAL DAILY
Status: DISCONTINUED | OUTPATIENT
Start: 2023-08-24 | End: 2023-08-26 | Stop reason: HOSPADM

## 2023-08-24 RX ORDER — CALCIUM CARBONATE 200(500)MG
500 TABLET,CHEWABLE ORAL DAILY PRN
Status: DISCONTINUED | OUTPATIENT
Start: 2023-08-24 | End: 2023-08-25

## 2023-08-24 RX ORDER — FAMOTIDINE 10 MG/ML
20 INJECTION INTRAVENOUS 2 TIMES DAILY
Status: DISCONTINUED | OUTPATIENT
Start: 2023-08-24 | End: 2023-08-26 | Stop reason: HOSPADM

## 2023-08-24 RX ORDER — POTASSIUM CHLORIDE 7.45 MG/ML
10 INJECTION INTRAVENOUS
Status: COMPLETED | OUTPATIENT
Start: 2023-08-24 | End: 2023-08-24

## 2023-08-24 RX ADMIN — HYDRALAZINE HYDROCHLORIDE 10 MG: 20 INJECTION INTRAMUSCULAR; INTRAVENOUS at 09:08

## 2023-08-24 RX ADMIN — POTASSIUM CHLORIDE 10 MEQ: 7.46 INJECTION, SOLUTION INTRAVENOUS at 08:08

## 2023-08-24 RX ADMIN — SODIUM CHLORIDE, POTASSIUM CHLORIDE, SODIUM LACTATE AND CALCIUM CHLORIDE: 600; 310; 30; 20 INJECTION, SOLUTION INTRAVENOUS at 01:08

## 2023-08-24 RX ADMIN — POTASSIUM CHLORIDE 10 MEQ: 7.46 INJECTION, SOLUTION INTRAVENOUS at 10:08

## 2023-08-24 RX ADMIN — SODIUM CHLORIDE, POTASSIUM CHLORIDE, SODIUM LACTATE AND CALCIUM CHLORIDE: 600; 310; 30; 20 INJECTION, SOLUTION INTRAVENOUS at 09:08

## 2023-08-24 RX ADMIN — POTASSIUM CHLORIDE 10 MEQ: 7.46 INJECTION, SOLUTION INTRAVENOUS at 07:08

## 2023-08-24 RX ADMIN — CETIRIZINE HYDROCHLORIDE 10 MG: 10 TABLET, FILM COATED ORAL at 02:08

## 2023-08-24 RX ADMIN — LIDOCAINE 1 PATCH: 50 PATCH CUTANEOUS at 05:08

## 2023-08-24 RX ADMIN — ACETAMINOPHEN 650 MG: 650 SUPPOSITORY RECTAL at 01:08

## 2023-08-24 RX ADMIN — ENOXAPARIN SODIUM 40 MG: 40 INJECTION SUBCUTANEOUS at 04:08

## 2023-08-24 RX ADMIN — FAMOTIDINE 20 MG: 10 INJECTION, SOLUTION INTRAVENOUS at 09:08

## 2023-08-24 RX ADMIN — ACETAMINOPHEN 650 MG: 650 SUPPOSITORY RECTAL at 09:08

## 2023-08-24 NOTE — PROGRESS NOTES
Gynecologic Oncology  Progress Note    Patient Name: Najma Hung  MRN: [id number]  Admission Date: 2023  Primary Care Provider: Neymar Cash III, MD   Principal Problem: Partial small bowel obstruction    Subjective:     Chief Complaint/Reason for Admission: Abdominal pain    History of Present Illness:   Najma Hung is a 68 y.o. 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 Patient states she has been having chronic abdominal pain for the past 3-4 months while on chemotherapy but reports over the past few days it has gotten worse. She has not taken her Lynparaza in the last two days. She states the pain is similar to when she was admitted last month (2023-2023). She has tried taking tylenol and Zofran with no relief. Patient endorses having a BM at home. She reports vomiting while in the ED but was not having any previous episodes of vomiting. She denies diarrhea or constipation.    She also reports left chest wall pain from pulling a muscle while doing yoga. She has been taking Tylenol with minimal relief.         Hospital Course:  2024: Admitted with malignant small bowel obstruction. NGT placed. NPO.  2023: NGT with 650cc output. Abdominal distention improved, not yet passing flatus or having BM. Continue NGT and NPO.        Objective:     Vital Signs (Most Recent):  Temp: 98.3 °F (36.8 °C) (23 0503)  Pulse: 76 (23 0503)  Resp: 18 (23 0503)  BP: (!) 151/66 (23 0503)  SpO2: (!) 93 % (23 0503) Vital Signs (24h Range):  Temp:  [97.8 °F (36.6 °C)-98.9 °F (37.2 °C)] 98.3 °F (36.8 °C)  Pulse:  [] 76  Resp:  [11-23] 18  SpO2:  [93 %-97 %] 93 %  BP: (151-204)/(66-86) 151/66     Weight: 53.5 kg (118 lb)  Body mass index is 19.05 kg/m².    Physical Exam:   Constitutional: She is oriented to person, place, and time. She appears well-developed and  well-nourished.    HENT:   Head: Normocephalic and atraumatic.    Eyes: EOM are normal.     Cardiovascular:  Normal rate.             Pulmonary/Chest: Effort normal. No respiratory distress.        Abdominal: Bowel sounds are normal. She exhibits distension (improved). There is no abdominal tenderness. There is no rebound and no guarding.                 Neurological: She is alert and oriented to person, place, and time.    Skin: Skin is warm and dry.    Psychiatric: She has a normal mood and affect. Her behavior is normal. Thought content normal.       Laboratory:  Recent Labs   Lab 08/18/23  1304 08/23/23  0429 08/23/23  0917 08/24/23  0440    140  --  140   K 4.2 4.0  --  3.4*    100  --  103   CO2 25 27  --  26   BUN 11 16  --  15   CREATININE 0.9 0.9  --  0.7   GLU 94 114*  --  80   PROT 7.7 8.1  --  6.2   BILITOT 0.5 0.5  --  0.6   ALKPHOS 78 86  --  65   ALT 18 15  --  13   AST 25 24  --  20   MG  --   --  1.8 1.9   PHOS  --   --  3.3 3.1        Diagnostic Results:  CT: Reviewed    Imaging Results              XR NG/OG tube placement check, non-radiologist performed (Final result)  Result time 08/23/23 10:12:46   Procedure changed from XR Gastric tube check, non-radiologist performed     Final result by Cassy Agrawal MD (08/23/23 10:12:46)                   Impression:      Please see above.      Electronically signed by: Cassy Agrawal  Date:    08/23/2023  Time:    10:12               Narrative:    EXAMINATION:  XR NG/OG TUBE PLACEMENT CHECK, NON-RADIOLOGIST PERFORMED    CLINICAL HISTORY:  partial SBO;    TECHNIQUE:  Supine radiograph of the upper abdomen obtained to assess NG tube placement.    COMPARISON:  None    FINDINGS:  Tip of the enteric tube projects over the stomach.    Lung bases are clear.                                        CT Abdomen Pelvis  Without Contrast (Final result)  Result time 08/23/23 05:18:54      Final result by Memo Meehan MD (08/23/23 05:18:54)                    Impression:      Abnormal small bowel pattern consistent with small bowel obstructive process, concerning for high-grade partial or complete obstruction, as discussed above.    Findings concerning for pulmonary and hepatic metastatic disease, as discussed above.    Small pleural effusion at the right lung base.    Urinary bladder wall thickening with perivesicular haziness, correlation for UTI/cystitis is needed.    Additional findings as above.    This report was flagged in Epic as abnormal.      Electronically signed by: Memo Meehan  Date:    08/23/2023  Time:    05:18               Narrative:    EXAMINATION:  CT ABDOMEN PELVIS WITHOUT CONTRAST    CLINICAL HISTORY:  Abdominal pain, acute, nonlocalized;    TECHNIQUE:  Low dose axial images, sagittal and coronal reformations were obtained from the lung bases to the pubic symphysis.  Intravenous contrast and oral contrast was not utilized, this diminishes the sensitivity of the examination.    COMPARISON:  CT examination of the abdomen and pelvis July 21, 2023    FINDINGS:  There is a small pleural effusion at the right lung base.  There are multiple small pulmonary nodules at the lung bases, this may relate to intrathoracic metastatic disease.    There is a small hiatal hernia noted.  The stomach demonstrates nonspecific appearance of moderate distention with fluid, air and ingested material.  There are dilated small bowel loops throughout the abdomen and pelvis with appearance of interloop edema and inflammatory change.  The distal small bowel including the terminal ileum appears decompressed.  It is difficult to delineate the specific point of transition, however thought likely within the central abdomen as seen on axial images 82 through 105.  In the central abdominal mesentery at this level there are multiple prominent lymph nodes noted as well.  Given the transition from prominently dilated small bowel to decompressed distal small bowel the  findings are concerning for high-grade partial or complete obstruction.  There is no evidence for pneumatosis, there is no portal venous air, mesenteric venous air or free intraperitoneal air.    The appendix is not identified.  There is mild prominence of the colon throughout its course with air and stool without inflammatory or obstructive pattern of the colon.    Hepatic hypodensities are noted, some of which may relate to cysts although not well evaluated on this examination there are additional areas of ill-defined hypodensity that are concerning for hepatic metastatic disease.    There is no evidence for acute process of the pancreas, spleen or adrenal glands.  There is no evidence for hydronephrosis or obstructive uropathy or perinephric inflammatory change bilaterally.  The abdominal aorta demonstrates atherosclerotic change, appears normal in caliber otherwise not optimally evaluated on this exam.  The urinary bladder is decompressed, wall thickening greater than expected for decompressed appearance is noted, correlation for UTI/cystitis is needed.    The osseous structures demonstrate chronic change, there is diminished mineralization and degenerative change noted.                                     Assessment/Plan:     Najma Hung is a 68 y.o. 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    Partial SBO  - NGT in place. 650cc output overnight  - CT: Abnormal small bowel pattern consistent with small bowel obstructive process, concerning for high-grade partial or complete obstruction. Findings concerning for pulmonary and hepatic metastatic disease. Small pleural effusion at the right lung base.  - Antiemetics: Zofran IV PRN, Compazine IV PRN  - Diet NPO +  cc/hr  - CBC stable  - CMP: K 3.4, Cr 0.7, Mag 1.9, Phos 3.1  - Pain control: Dilaudid PRN  - Daily CMP/Mg/Phos: Will replace electrolytes PRN with  goal K >4.0, Mg > 2,  Phos >3  - Pepcid BID  - Dexamethasone 4mg BID      Hypertension (not on medication)  - Hydral PRN    Stage IIIB high grade serous ovarian cancer   - See Gyn Oncology history above  - On Lynparza BID. Will hold at this time.   - Lovenox daily for VTE prophylaxis     CKD 2  - Baseline Cr 0.9.   - Withholding nephrotoxic agents  - Daily CMP    OAB (not on medication)  - Asymptomatic  - Will continue to monitor       Rayna Viera MD/MPH  OB/GYN PGY4

## 2023-08-24 NOTE — PLAN OF CARE
Problem: Adult Inpatient Plan of Care  Goal: Plan of Care Review  Outcome: Ongoing, Progressing  Goal: Patient-Specific Goal (Individualized)  Outcome: Ongoing, Progressing  Goal: Absence of Hospital-Acquired Illness or Injury  Outcome: Ongoing, Progressing  Goal: Optimal Comfort and Wellbeing  Outcome: Ongoing, Progressing  POC reviewed with pt and spouse at bedside. A&Ox4. Room air. NGT to left nare to LIS. LR at 125 mL/hr. NPO. PRN medication administered for pain. Safety checks performed. Bed in lowest position. Wheels locked. Call light in reach. WCTM.

## 2023-08-24 NOTE — PLAN OF CARE
Problem: Adult Inpatient Plan of Care  Goal: Plan of Care Review  Outcome: Ongoing, Progressing  Goal: Absence of Hospital-Acquired Illness or Injury  Outcome: Ongoing, Progressing  Goal: Optimal Comfort and Wellbeing  Outcome: Ongoing, Progressing     Problem: Fall Injury Risk  Goal: Absence of Fall and Fall-Related Injury  Outcome: Ongoing, Progressing     Problem: Nausea and Vomiting  Goal: Fluid and Electrolyte Balance  Outcome: Ongoing, Progressing   POC reviewed with pt who verbalized understanding. AAOx4. VSS on RA. Remains free of falls and injury. NGT in place to LIWS. Up to bedside commode. Resting between care. Call light in reach and rounds made for safety.  at bedside. Will continue to monitor.

## 2023-08-24 NOTE — ASSESSMENT & PLAN NOTE
- See Gyn Oncology history above  - On Lynparza BID. Will hold at this time.   - Lovenox daily for VTE prophylaxis

## 2023-08-24 NOTE — HPI
Najma Hung is a 68 y.o. 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 Patient states she has been having chronic abdominal pain for the past 3-4 months while on chemotherapy but reports over the past few days it has gotten worse. She has not taken her Lynparaza in the last two days. She states the pain is similar to when she was admitted last month (2023-2023). She has tried taking tylenol and Zofran with no relief. Patient endorses having a BM at home. She reports vomiting while in the ED but was not having any previous episodes of vomiting. She denies diarrhea or constipation.     She also reports left chest wall pain from pulling a muscle while doing yoga. She has been taking Tylenol with minimal relief.         Oncology History:   2020 Imaging Significant Finding   Pelvic US with multicystic enlarged ovaries bilaterally, largest 8cm, some with mural nodularity.     2020 Imaging Significant Finding   CT A/P with large (12.4 x 9.2 cm), complex, multi cystic mass in pelvis with mural nodularity and numerous septations. Numerous hypodensities in liver, favor hepatic cysts.     2020- Elap, BSO, radical cytoreduction, supracolic omentectomy, bilateral ureterolysis, staging biopsies, bilateral pelvic/para-aortic LAD, striping of pelvic/bladder peritoneum, and cystoscopy with Dr. Olivier. 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 Ovarian surface ruptured bilaterally. LVSI in right ovary. Metastatic disease in pelvic peritoneum, omentum, and 2/8 pelvic lymph nodes.     CARUS testing 20 specimen: HER2/Adrian negative (0), HRD negative, KRAS amplified, TP53 pathogenic variant, Microstellite stable, Mmr proficient, TMB low, BRCA1 and BRCA2 wildtype, ER negative, FOLR1 negative, PDL1 negative     -31. (Prior to chemo)       8/10/2020- 2021  "chemotherapy.   8/20/2020- Course #1 of Carbo/Taxol   9/10/20- Course #2 Carbo/Taxotere  10/15/2020 - unable to tolerate combination chemo; changed to single agent carboplatin  2/10/2020 - dose reduce carboplatin to 90% - course #4 carboplatin  1/28/2021- dose reduce carboplatin to 80%; course #5 carboplatin (delayed due to GI issues)     9/8/2020- [facility] My Risk genetic testing, NEGATIVE     -30.6 (completion of chemo)      4/19/21- PET scan showed:  No evidence for FDG avid metastasis or recurrence.     5/5/21- Patient decided not to go on PARP     5/17/21- [facility] CDx testing: POSITIVE     11/29/21-  = 70.7     12/15/21- PET scan showed:  No evidence for FDG avid metastasis or recurrence     2/22: -363      2/17/22- CT abdomen and pelvis showed:  1. Multiple subcentimeter hypodensities throughout the liver suspicious for multifocal hepatic metastatic disease.  **I reviewed the CT scan from [facility] dated 6/2020 and compared it to [facility] CT scan dated 2/2022 with radiologist Dr. Soares. There are virtually the same numerous hepatic cysts seen in 6/2020 - 5-7 cysts all under 10mm, each cyst measures within 1-2 mm compared to the cysts diameter on current scan. These are "stable" cysts over 1+ years suggesting likely benign nature. Also, PET scan from Dec 2021 showed no FDG avid lesions. Najma decided to postpone liver biopsy and repeat  in 1 month      continued to rise 177>241.3      5/25/2022:  continues to rise >200. Decided to proceed with re-treatment with single agent carboplatin. Course #1 carboplatin with neulasta     8/22 PET: No evidence of FDG met. disease. liver lesion not FDG avid.  Indeterminate foci of FDG in lower pelvis.       355 (8/2022) >1155 (11/22)      11/22 PET: Interval development of FDG avid pericapsular hepatic and splenic implants as well as FDG avid peritoneal and mesenteric soft tissue metastatic deposits.      12/2022- Doxil and Carbo.   "   4/23 PET- Interval positive response to treatment. Decrease and resolution of some of the mesenteric and peritoneal implants. Decreased FDG activity of hepatic pericapsular subcapsular metastatic disease. Resolution of previously seen. Splenic implant      4/23: -038      6/1/2023 Began Lynparza 200mg BID      6/2023:-445      6/28/2023: - 513      7/23- PET: 1. Progression of disease with increased degree of hypermetabolic activity involving the known serosal implants as well as new serosal implant adjacent to the spleen.  2. Concern for new metastatic nodule within the middle lobe     Oncology Treatment Plan:   Lynparza BID

## 2023-08-24 NOTE — H&P
[facility] Emergency Dept  Gynecologic Oncology  H&P    Patient Name: Najma Hung  MRN: [id number]  Admission Date: 2023  Primary Care Provider: Neymar Cash III, MD   Principal Problem: Partial small bowel obstruction    Subjective:     Chief Complaint/Reason for Admission: Abdominal pain    History of Present Illness:   Najma Hung is a 68 y.o. 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 Patient states she has been having chronic abdominal pain for the past 3-4 months while on chemotherapy but reports over the past few days it has gotten worse. She has not taken her Lynparaza in the last two days. She states the pain is similar to when she was admitted last month (2023-2023). She has tried taking tylenol and Zofran with no relief. Patient endorses having a BM at home. She reports vomiting while in the ED but was not having any previous episodes of vomiting. She denies diarrhea or constipation.    She also reports left chest wall pain from pulling a muscle while doing yoga. She has been taking Tylenol with minimal relief.         Hospital Course:  2024: Admitted with malignant small bowel obstruction. NGT placed. NPO.  2023: NGT with 650cc output. Abdominal distention improved, not yet passing flatus or having BM. Continue NGT and NPO.        Objective:     Vital Signs (Most Recent):  Temp: 98.3 °F (36.8 °C) (23 0503)  Pulse: 76 (23 0503)  Resp: 18 (23 0503)  BP: (!) 151/66 (23 0503)  SpO2: (!) 93 % (23 0503) Vital Signs (24h Range):  Temp:  [97.8 °F (36.6 °C)-98.9 °F (37.2 °C)] 98.3 °F (36.8 °C)  Pulse:  [] 76  Resp:  [11-23] 18  SpO2:  [93 %-97 %] 93 %  BP: (151-204)/(66-86) 151/66     Weight: 53.5 kg (118 lb)  Body mass index is 19.05 kg/m².    Physical Exam:   Constitutional: She is oriented to person, place, and time. She appears  well-developed and well-nourished.    HENT:   Head: Normocephalic and atraumatic.    Eyes: EOM are normal.     Cardiovascular:  Normal rate.             Pulmonary/Chest: Effort normal. No respiratory distress.        Abdominal: Bowel sounds are normal. She exhibits distension (improved). There is no abdominal tenderness. There is no rebound and no guarding.                 Neurological: She is alert and oriented to person, place, and time.    Skin: Skin is warm and dry.    Psychiatric: She has a normal mood and affect. Her behavior is normal. Thought content normal.       Laboratory:  Recent Labs   Lab 08/18/23  1304 08/23/23  0429 08/23/23  0917 08/24/23  0440    140  --  140   K 4.2 4.0  --  3.4*    100  --  103   CO2 25 27  --  26   BUN 11 16  --  15   CREATININE 0.9 0.9  --  0.7   GLU 94 114*  --  80   PROT 7.7 8.1  --  6.2   BILITOT 0.5 0.5  --  0.6   ALKPHOS 78 86  --  65   ALT 18 15  --  13   AST 25 24  --  20   MG  --   --  1.8 1.9   PHOS  --   --  3.3 3.1        Diagnostic Results:  CT: Reviewed    Imaging Results              XR NG/OG tube placement check, non-radiologist performed (Final result)  Result time 08/23/23 10:12:46   Procedure changed from XR Gastric tube check, non-radiologist performed     Final result by Cassy Agrawal MD (08/23/23 10:12:46)                   Impression:      Please see above.      Electronically signed by: Cassy Agrawal  Date:    08/23/2023  Time:    10:12               Narrative:    EXAMINATION:  XR NG/OG TUBE PLACEMENT CHECK, NON-RADIOLOGIST PERFORMED    CLINICAL HISTORY:  partial SBO;    TECHNIQUE:  Supine radiograph of the upper abdomen obtained to assess NG tube placement.    COMPARISON:  None    FINDINGS:  Tip of the enteric tube projects over the stomach.    Lung bases are clear.                                        CT Abdomen Pelvis  Without Contrast (Final result)  Result time 08/23/23 05:18:54      Final result by Memo Meehan MD  (08/23/23 05:18:54)                   Impression:      Abnormal small bowel pattern consistent with small bowel obstructive process, concerning for high-grade partial or complete obstruction, as discussed above.    Findings concerning for pulmonary and hepatic metastatic disease, as discussed above.    Small pleural effusion at the right lung base.    Urinary bladder wall thickening with perivesicular haziness, correlation for UTI/cystitis is needed.    Additional findings as above.    This report was flagged in Epic as abnormal.      Electronically signed by: Memo Meehan  Date:    08/23/2023  Time:    05:18               Narrative:    EXAMINATION:  CT ABDOMEN PELVIS WITHOUT CONTRAST    CLINICAL HISTORY:  Abdominal pain, acute, nonlocalized;    TECHNIQUE:  Low dose axial images, sagittal and coronal reformations were obtained from the lung bases to the pubic symphysis.  Intravenous contrast and oral contrast was not utilized, this diminishes the sensitivity of the examination.    COMPARISON:  CT examination of the abdomen and pelvis July 21, 2023    FINDINGS:  There is a small pleural effusion at the right lung base.  There are multiple small pulmonary nodules at the lung bases, this may relate to intrathoracic metastatic disease.    There is a small hiatal hernia noted.  The stomach demonstrates nonspecific appearance of moderate distention with fluid, air and ingested material.  There are dilated small bowel loops throughout the abdomen and pelvis with appearance of interloop edema and inflammatory change.  The distal small bowel including the terminal ileum appears decompressed.  It is difficult to delineate the specific point of transition, however thought likely within the central abdomen as seen on axial images 82 through 105.  In the central abdominal mesentery at this level there are multiple prominent lymph nodes noted as well.  Given the transition from prominently dilated small bowel to decompressed  distal small bowel the findings are concerning for high-grade partial or complete obstruction.  There is no evidence for pneumatosis, there is no portal venous air, mesenteric venous air or free intraperitoneal air.    The appendix is not identified.  There is mild prominence of the colon throughout its course with air and stool without inflammatory or obstructive pattern of the colon.    Hepatic hypodensities are noted, some of which may relate to cysts although not well evaluated on this examination there are additional areas of ill-defined hypodensity that are concerning for hepatic metastatic disease.    There is no evidence for acute process of the pancreas, spleen or adrenal glands.  There is no evidence for hydronephrosis or obstructive uropathy or perinephric inflammatory change bilaterally.  The abdominal aorta demonstrates atherosclerotic change, appears normal in caliber otherwise not optimally evaluated on this exam.  The urinary bladder is decompressed, wall thickening greater than expected for decompressed appearance is noted, correlation for UTI/cystitis is needed.    The osseous structures demonstrate chronic change, there is diminished mineralization and degenerative change noted.                                     Assessment/Plan:     Najma Hung is a 68 y.o. 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    Partial SBO  - NGT in place. 650cc output overnight  - CT: Abnormal small bowel pattern consistent with small bowel obstructive process, concerning for high-grade partial or complete obstruction. Findings concerning for pulmonary and hepatic metastatic disease. Small pleural effusion at the right lung base.  - Antiemetics: Zofran IV PRN, Compazine IV PRN  - Diet NPO +  cc/hr  - CBC stable  - CMP: K 3.4, Cr 0.7, Mag 1.9, Phos 3.1  - Pain control: Dilaudid PRN  - Daily CMP/Mg/Phos: Will replace  electrolytes PRN with goal K >4.0, Mg > 2,  Phos >3  - Pepcid BID  - Dexamethasone 4mg BID      Hypertension (not on medication)  - Hydral PRN    Stage IIIB high grade serous ovarian cancer   - See Gyn Oncology history above  - On Lynparza BID. Will hold at this time.   - Lovenox daily for VTE prophylaxis     CKD 2  - Baseline Cr 0.9.   - Withholding nephrotoxic agents  - Daily CMP    OAB (not on medication)  - Asymptomatic  - Will continue to monitor       Rayna Viera MD/MPH  OB/GYN PGY4

## 2023-08-24 NOTE — ASSESSMENT & PLAN NOTE
- NGT in place. 650cc output overnight  - CT: Abnormal small bowel pattern consistent with small bowel obstructive process, concerning for high-grade partial or complete obstruction. Findings concerning for pulmonary and hepatic metastatic disease. Small pleural effusion at the right lung base.  - Antiemetics: Zofran IV PRN, Compazine IV PRN  - Diet NPO +  cc/hr  - CBC stable  - CMP: K 3.4, Cr 0.7, Mag 1.9, Phos 3.1  - Pain control: Dilaudid PRN  - Daily CMP/Mg/Phos: Will replace electrolytes PRN with goal K >4.0, Mg > 2,  Phos >3  - Pepcid BID  - Dexamethasone 4mg BID

## 2023-08-24 NOTE — SUBJECTIVE & OBJECTIVE
Interval History: ***    Scheduled Meds:   enoxparin  40 mg Subcutaneous Q24H (prophylaxis, 1700)    famotidine (PF)  20 mg Intravenous BID    LIDOcaine  1 patch Transdermal Q24H     Continuous Infusions:   lactated ringers 125 mL/hr at 08/24/23 0159     PRN Meds:acetaminophen, hydrALAZINE, HYDROmorphone, ondansetron, prochlorperazine, sodium chloride 0.9%    Review of patient's allergies indicates:   Allergen Reactions    Adhesive Dermatitis and Swelling     Skin adhesive (skinaffix)    Iodinated contrast media Other (See Comments), Diarrhea and Shortness Of Breath    Iodine Other (See Comments)     FAINTING, renal issues, Bilateral flank spasms  IODINE IN CONTRAST    Chamomile flower Hives    Codeine     Dyclonine Hives    Mold Other (See Comments)    Pcn [penicillins]     Pennyroyal oil Other (See Comments)    Peppermint Other (See Comments)    Diphenhydramine hcl Rash     Sometimes rash       Objective:     Vital Signs (Most Recent):  Temp: 98.2 °F (36.8 °C) (08/24/23 1130)  Pulse: 86 (08/24/23 1130)  Resp: 16 (08/24/23 1130)  BP: (!) 179/74 (08/24/23 1130)  SpO2: 97 % (08/24/23 1130) Vital Signs (24h Range):  Temp:  [97.8 °F (36.6 °C)-98.9 °F (37.2 °C)] 98.2 °F (36.8 °C)  Pulse:  [] 86  Resp:  [15-21] 16  SpO2:  [93 %-97 %] 97 %  BP: (151-189)/(66-84) 179/74     Weight: 53.5 kg (118 lb)  Body mass index is 19.05 kg/m².    Intake/Output - Last 3 Shifts         08/22 0700 08/23 0659 08/23 0700 08/24 0659 08/24 0700 08/25 0659    P.O.  0     I.V. (mL/kg) 999 2368.3 (44.3)     Total Intake(mL/kg) 999 2368.3 (44.3)     Urine (mL/kg/hr)  850 (0.7)     Drains  650 450    Total Output  1500 450    Net +999 +868.3 -450                         Physical Exam       Lines/Drains/Airways       Drain  Duration                  NG/OG Tube 08/23/23 0956 16 Fr. Left nostril 1 day              Peripheral Intravenous Line  Duration                  Peripheral IV - Single Lumen 08/23/23 0426 20 G Left;Posterior Wrist 1 day  "                   Laboratory:  {Select Labs:21161}    Diagnostic Results:  {Results; Diagnostics:[id number]::"***"}  "

## 2023-08-24 NOTE — HOSPITAL COURSE
08/23/2024: Admitted with malignant small bowel obstruction. NGT placed. NPO.  08/24/2023: NGT with 650cc output. Abdominal distention improved, not yet passing flatus or having BM. Continue NGT and NPO.   08/25/2023: NGT out overnight. Gastrograffin study completed with no obstruction. Passing flatus.  08/26/2023: Continued passage of flatus, having bowel movements. Anticipate discharge today.

## 2023-08-24 NOTE — CARE UPDATE
"Afternoon Assessment:    Patient resting in bed comfortably. Reports that she tried chloraseptic spray for throat, but has mild allergy to red dye so she is now sneezing which is painful. Abdominal pain resolved. Ambulating, voiding. No return of bowel function.    Temp:  [97.8 °F (36.6 °C)-98.9 °F (37.2 °C)] 98.2 °F (36.8 °C)  Pulse:  [] 86  Resp:  [15-21] 16  SpO2:  [93 %-97 %] 97 %  BP: (151-189)/(66-84) 179/74    PE:  Abdomen: deferred    Labs:  No results for input(s): "WBC", "RBC", "HGB", "HCT", "PLT", "MCV", "MCH", "MCHC" in the last 24 hours.    A/P:  - Will order zyrtec for allergies  - Patient strongly desires to take home calcium carbonate powder for GERD (currently declining pepcid). Will reach out to pharmacy about obtaining powder without dye.  - Xray ordered to confirm NGT placement, as appears to be falling out after sneezing.    Rayna Viera MD/MPH  OB/GYN PGY4      "

## 2023-08-24 NOTE — PLAN OF CARE
Patient AAOx3, independent at baseline. PCP correct on facesheet. Patient denies owning any DME.  at bedside and will provide transportation home.   08/24/23 1005   Discharge Assessment   Assessment Type Discharge Planning Assessment   Confirmed/corrected address, phone number and insurance Yes   Confirmed Demographics Correct on Facesheet   Source of Information patient   Communicated BRYANNA with patient/caregiver Date not available/Unable to determine   People in Home spouse   Do you expect to return to your current living situation? Yes   Do you have help at home or someone to help you manage your care at home? Yes   Who are your caregiver(s) and their phone number(s)? Cody Ferrara (Significant other)   [phone] (Home Phone)   Prior to hospitilization cognitive status: Alert/Oriented   Current cognitive status: Alert/Oriented   Equipment Currently Used at Home none   Readmission within 30 days? No   Do you currently have service(s) that help you manage your care at home? No   Do you take prescription medications? Yes   Do you have prescription coverage? Yes   Do you have any problems affording any of your prescribed medications? No   Is the patient taking medications as prescribed? yes   Who is going to help you get home at discharge? Cody Ferrara (Significant other)   [phone] (Home Phone)   How do you get to doctors appointments? family or friend will provide   Are you on dialysis? No   Do you take coumadin? No   DME Needed Upon Discharge  none   Discharge Plan discussed with: Patient   Transition of Care Barriers None   Discharge Plan A Home with family   Discharge Plan B Home     [facility] ([address])  Initial Discharge Assessment       Primary Care Provider: Neymar Cash III, MD    Admission Diagnosis: Small bowel obstruction [K56.609]    Admission Date: 8/23/2023  Expected Discharge Date:     Transition of Care Barriers: (P) None    Payor: MEDICARE / Plan: MEDICARE PART A & B  / Product Type: Government /     Extended Emergency Contact Information  Primary Emergency Contact: Cody Ferrara  Home Phone: [phone]  Relation: Significant other  Preferred language: English    Discharge Plan A: (P) Home with family  Discharge Plan B: (P) Home      [id number] - [place], [place] - [address]  [address]  [place] [address]  Phone: [phone] Fax: [phone]    [facility]  [address] [facility] [address]  Phone: [phone] Fax: [phone]      Initial Assessment (most recent)       Adult Discharge Assessment - 08/24/23 1005          Discharge Assessment    Assessment Type Discharge Planning Assessment (P)      Confirmed/corrected address, phone number and insurance Yes (P)      Confirmed Demographics Correct on Facesheet (P)      Source of Information patient (P)      Communicated BRYANNA with patient/caregiver Date not available/Unable to determine (P)      People in Home spouse (P)      Do you expect to return to your current living situation? Yes (P)      Do you have help at home or someone to help you manage your care at home? Yes (P)      Who are your caregiver(s) and their phone number(s)? Cody Ferrara (Significant other)   [phone] (Home Phone) (P)      Prior to hospitilization cognitive status: Alert/Oriented (P)      Current cognitive status: Alert/Oriented (P)      Equipment Currently Used at Home none (P)      Readmission within 30 days? No (P)      Do you currently have service(s) that help you manage your care at home? No (P)      Do you take prescription medications? Yes (P)      Do you have prescription coverage? Yes (P)      Do you have any problems affording any of your prescribed medications? No (P)      Is the patient taking medications as prescribed? yes (P)      Who is going to help you get home at discharge? Cody Ferrara (Significant other)   [phone] (Home Phone) (P)      How do you get to doctors  appointments? family or friend will provide (P)      Are you on dialysis? No (P)      Do you take coumadin? No (P)      DME Needed Upon Discharge  none (P)      Discharge Plan discussed with: Patient (P)      Transition of Care Barriers None (P)      Discharge Plan A Home with family (P)      Discharge Plan B Home (P)

## 2023-08-25 LAB
ALBUMIN SERPL BCP-MCNC: 3.3 G/DL (ref 3.5–5.2)
ALP SERPL-CCNC: 65 U/L (ref 55–135)
ALT SERPL W/O P-5'-P-CCNC: 12 U/L (ref 10–44)
ANION GAP SERPL CALC-SCNC: 18 MMOL/L (ref 8–16)
AST SERPL-CCNC: 22 U/L (ref 10–40)
BILIRUB SERPL-MCNC: 0.7 MG/DL (ref 0.1–1)
BUN SERPL-MCNC: 13 MG/DL (ref 8–23)
CALCIUM SERPL-MCNC: 8.4 MG/DL (ref 8.7–10.5)
CHLORIDE SERPL-SCNC: 103 MMOL/L (ref 95–110)
CO2 SERPL-SCNC: 16 MMOL/L (ref 23–29)
CREAT SERPL-MCNC: 0.7 MG/DL (ref 0.5–1.4)
EST. GFR  (NO RACE VARIABLE): >60 ML/MIN/1.73 M^2
GLUCOSE SERPL-MCNC: 58 MG/DL (ref 70–110)
MAGNESIUM SERPL-MCNC: 1.6 MG/DL (ref 1.6–2.6)
PHOSPHATE SERPL-MCNC: 2.7 MG/DL (ref 2.7–4.5)
POTASSIUM SERPL-SCNC: 3.4 MMOL/L (ref 3.5–5.1)
PROT SERPL-MCNC: 6.1 G/DL (ref 6–8.4)
SODIUM SERPL-SCNC: 137 MMOL/L (ref 136–145)

## 2023-08-25 PROCEDURE — 63600175 PHARM REV CODE 636 W HCPCS

## 2023-08-25 PROCEDURE — 25000003 PHARM REV CODE 250: Performed by: GENERAL PRACTICE

## 2023-08-25 PROCEDURE — 11000001 HC ACUTE MED/SURG PRIVATE ROOM

## 2023-08-25 PROCEDURE — 99232 SBSQ HOSP IP/OBS MODERATE 35: CPT | Mod: ,,, | Performed by: OBSTETRICS & GYNECOLOGY

## 2023-08-25 PROCEDURE — 80053 COMPREHEN METABOLIC PANEL: CPT

## 2023-08-25 PROCEDURE — 25000003 PHARM REV CODE 250: Performed by: STUDENT IN AN ORGANIZED HEALTH CARE EDUCATION/TRAINING PROGRAM

## 2023-08-25 PROCEDURE — 25500020 PHARM REV CODE 255: Performed by: STUDENT IN AN ORGANIZED HEALTH CARE EDUCATION/TRAINING PROGRAM

## 2023-08-25 PROCEDURE — 84100 ASSAY OF PHOSPHORUS: CPT

## 2023-08-25 PROCEDURE — 63600175 PHARM REV CODE 636 W HCPCS: Performed by: GENERAL PRACTICE

## 2023-08-25 PROCEDURE — 99232 PR SUBSEQUENT HOSPITAL CARE,LEVL II: ICD-10-PCS | Mod: ,,, | Performed by: OBSTETRICS & GYNECOLOGY

## 2023-08-25 PROCEDURE — 63600175 PHARM REV CODE 636 W HCPCS: Performed by: STUDENT IN AN ORGANIZED HEALTH CARE EDUCATION/TRAINING PROGRAM

## 2023-08-25 PROCEDURE — 83735 ASSAY OF MAGNESIUM: CPT

## 2023-08-25 RX ORDER — EPINEPHRINE 0.1 MG/ML
0.3 INJECTION INTRAVENOUS ONCE
Status: DISCONTINUED | OUTPATIENT
Start: 2023-08-25 | End: 2023-08-26 | Stop reason: HOSPADM

## 2023-08-25 RX ORDER — LORAZEPAM 2 MG/ML
0.5 INJECTION INTRAMUSCULAR ONCE
Status: COMPLETED | OUTPATIENT
Start: 2023-08-25 | End: 2023-08-25

## 2023-08-25 RX ORDER — MAGNESIUM SULFATE HEPTAHYDRATE 40 MG/ML
2 INJECTION, SOLUTION INTRAVENOUS ONCE
Status: COMPLETED | OUTPATIENT
Start: 2023-08-25 | End: 2023-08-25

## 2023-08-25 RX ORDER — LIDOCAINE HYDROCHLORIDE 20 MG/ML
JELLY TOPICAL
Status: DISCONTINUED | OUTPATIENT
Start: 2023-08-25 | End: 2023-08-26 | Stop reason: HOSPADM

## 2023-08-25 RX ORDER — LORAZEPAM 2 MG/ML
1 INJECTION INTRAMUSCULAR ONCE
Status: DISCONTINUED | OUTPATIENT
Start: 2023-08-25 | End: 2023-08-26 | Stop reason: HOSPADM

## 2023-08-25 RX ORDER — POTASSIUM CHLORIDE 7.45 MG/ML
40 INJECTION INTRAVENOUS ONCE
Status: COMPLETED | OUTPATIENT
Start: 2023-08-25 | End: 2023-08-25

## 2023-08-25 RX ADMIN — MAGNESIUM SULFATE HEPTAHYDRATE 2 G: 40 INJECTION, SOLUTION INTRAVENOUS at 12:08

## 2023-08-25 RX ADMIN — POTASSIUM CHLORIDE 40 MEQ: 7.46 INJECTION, SOLUTION INTRAVENOUS at 06:08

## 2023-08-25 RX ADMIN — ACETAMINOPHEN 650 MG: 650 SUPPOSITORY RECTAL at 10:08

## 2023-08-25 RX ADMIN — LORAZEPAM 0.5 MG: 2 INJECTION INTRAMUSCULAR; INTRAVENOUS at 02:08

## 2023-08-25 RX ADMIN — LIDOCAINE 1 PATCH: 50 PATCH CUTANEOUS at 04:08

## 2023-08-25 RX ADMIN — ENOXAPARIN SODIUM 40 MG: 40 INJECTION SUBCUTANEOUS at 05:08

## 2023-08-25 RX ADMIN — ACETAMINOPHEN 650 MG: 650 SUPPOSITORY RECTAL at 08:08

## 2023-08-25 RX ADMIN — DIATRIZOATE MEGLUMINE AND DIATRIZOATE SODIUM 100 ML: 660; 100 LIQUID ORAL; RECTAL at 03:08

## 2023-08-25 RX ADMIN — LIDOCAINE HYDROCHLORIDE: 20 JELLY TOPICAL at 01:08

## 2023-08-25 RX ADMIN — LORAZEPAM 0.5 MG: 2 INJECTION INTRAMUSCULAR; INTRAVENOUS at 01:08

## 2023-08-25 RX ADMIN — SODIUM CHLORIDE, POTASSIUM CHLORIDE, SODIUM LACTATE AND CALCIUM CHLORIDE: 600; 310; 30; 20 INJECTION, SOLUTION INTRAVENOUS at 08:08

## 2023-08-25 RX ADMIN — PROCHLORPERAZINE EDISYLATE 5 MG: 5 INJECTION, SOLUTION INTRAMUSCULAR; INTRAVENOUS at 08:08

## 2023-08-25 NOTE — NURSING
Patient got up to bedside commode to void & when she sneezed her NG tube came out. Patient wants me to ask the MD if it can be put back in later today.. will page MD

## 2023-08-25 NOTE — PROGRESS NOTES
Notified by nursing that patient sneezed out NG tube while walking to bathroom. Patient declines having NG tube replaced at this time and desires to wait a couple of hours before having replaced. Discussed that if she begins to have nausea/vomiting will need to be replaced sooner. Will continue to closely monitor with low threshold to replace if patient agreeable.

## 2023-08-25 NOTE — PROGRESS NOTES
Gynecologic Oncology  Progress Note    Patient Name: Najma Hung  MRN: [id number]  Admission Date: 2023  Primary Care Provider: Neymar Cash III, MD   Principal Problem: Partial small bowel obstruction    Subjective:     Chief Complaint/Reason for Admission: Abdominal pain    History of Present Illness:   Najma Hung is a 68 y.o. 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 Patient states she has been having chronic abdominal pain for the past 3-4 months while on chemotherapy but reports over the past few days it has gotten worse. She has not taken her Lynparaza in the last two days. She states the pain is similar to when she was admitted last month (2023-2023). She has tried taking tylenol and Zofran with no relief. Patient endorses having a BM at home. She reports vomiting while in the ED but was not having any previous episodes of vomiting. She denies diarrhea or constipation.    She also reports left chest wall pain from pulling a muscle while doing yoga. She has been taking Tylenol with minimal relief.         Oncology History:   2020 Imaging Significant Finding   Pelvic US with multicystic enlarged ovaries bilaterally, largest 8cm, some with mural nodularity.     2020 Imaging Significant Finding   CT A/P with large (12.4 x 9.2 cm), complex, multi cystic mass in pelvis with mural nodularity and numerous septations. Numerous hypodensities in liver, favor hepatic cysts.     2020- [facility], BSO, radical cytoreduction, supracolic omentectomy, bilateral ureterolysis, staging biopsies, bilateral pelvic/para-aortic LAD, striping of pelvic/bladder peritoneum, and cystoscopy with Dr. Olivier. 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 Ovarian surface ruptured bilaterally. LVSI in right ovary. Metastatic disease in pelvic peritoneum, omentum,  "and 2/8 pelvic lymph nodes.     CARUS testing 7/25/20 specimen: HER2/Adrian negative (0), HRD negative, KRAS amplified, TP53 pathogenic variant, Microstellite stable, Mmr proficient, TMB low, BRCA1 and BRCA2 wildtype, ER negative, FOLR1 negative, PDL1 negative     -07. (Prior to chemo)       8/10/2020- 1/28/2021 chemotherapy.   8/20/2020- Course #1 of Carbo/Taxol   9/10/20- Course #2 Carbo/Taxotere  10/15/2020 - unable to tolerate combination chemo; changed to single agent carboplatin  2/10/2020 - dose reduce carboplatin to 90% - course #4 carboplatin  1/28/2021- dose reduce carboplatin to 80%; course #5 carboplatin (delayed due to GI issues)     9/8/2020- [facility] My Risk genetic testing, NEGATIVE     -72.6 (completion of chemo)      4/19/21- PET scan showed:  No evidence for FDG avid metastasis or recurrence.     5/5/21- Patient decided not to go on PARP     5/17/21- [employer] myChoHeyo CDx testing: POSITIVE     11/29/21-  = 70.7     12/15/21- PET scan showed:  No evidence for FDG avid metastasis or recurrence     2/22: -627      2/17/22- CT abdomen and pelvis showed:  1. Multiple subcentimeter hypodensities throughout the liver suspicious for multifocal hepatic metastatic disease.  **I reviewed the CT scan from [facility] dated 6/2020 and compared it to [place] scan dated 2/2022 with radiologist Dr. Soares. There are virtually the same numerous hepatic cysts seen in 6/2020 - 5-7 cysts all under 10mm, each cyst measures within 1-2 mm compared to the cysts diameter on current scan. These are "stable" cysts over 1+ years suggesting likely benign nature. Also, PET scan from Dec 2021 showed no FDG avid lesions. Najma decided to postpone liver biopsy and repeat  in 1 month      continued to rise 177>241.3      5/25/2022:  continues to rise >200. Decided to proceed with re-treatment with single agent carboplatin. Course #1 carboplatin with neulasta     8/22 PET: No evidence of FDG met. " disease. liver lesion not FDG avid.  Indeterminate foci of FDG in lower pelvis.       355 (8/2022) >1155 (11/22)      11/22 PET: Interval development of FDG avid pericapsular hepatic and splenic implants as well as FDG avid peritoneal and mesenteric soft tissue metastatic deposits.      12/2022- Doxil and Carbo.     4/23 PET- Interval positive response to treatment. Decrease and resolution of some of the mesenteric and peritoneal implants. Decreased FDG activity of hepatic pericapsular subcapsular metastatic disease. Resolution of previously seen. Splenic implant      4/23: -079      6/1/2023 Began Lynparza 200mg BID      6/2023:-445      6/28/2023: - 513      7/23- PET: 1. Progression of disease with increased degree of hypermetabolic activity involving the known serosal implants as well as new serosal implant adjacent to the spleen.  2. Concern for new metastatic nodule within the middle lobe     Oncology Treatment Plan:   Lynparza [facility]    Hospital Course:  08/23/2024: Admitted with malignant small bowel obstruction. NGT placed. NPO.  08/24/2023: NGT with 650cc output. Abdominal distention improved, not yet passing flatus or having BM. Continue NGT and NPO.     Interval History:   Overnight, patient felt very anxious with NG tube in place requiring ativan. While ambulating to bathroom NG tube came out and patient requested it to not be replaced. Denies nausea or vomiting without requiring anti-emetics. Reports passing flatus. No BM. Endorses feeling hungry. Abdominal pain well controlled without requiring pain medication. Ambulating minimally; however, without difficulty. Voiding without difficulty.        Objective:     Vital Signs (Most Recent):  Temp: 98.3 °F (36.8 °C) (08/24/23 0503)  Pulse: 76 (08/24/23 0503)  Resp: 18 (08/24/23 0503)  BP: (!) 151/66 (08/24/23 0503)  SpO2: (!) 93 % (08/24/23 0503) Vital Signs (24h Range):  Temp:  [97.8 °F (36.6 °C)-98.9 °F (37.2 °C)] 98.3 °F (36.8  °C)  Pulse:  [] 76  Resp:  [11-23] 18  SpO2:  [93 %-97 %] 93 %  BP: (151-204)/(66-86) 151/66     Weight: 53.5 kg (118 lb)  Body mass index is 19.05 kg/m².    Physical Exam:   Constitutional: She is oriented to person, place, and time. She appears well-developed and well-nourished.    HENT:   Head: Normocephalic and atraumatic.    Eyes: EOM are normal.     Cardiovascular:  Normal rate.             Pulmonary/Chest: Effort normal. No respiratory distress.      Abdominal: Bowel sounds are hypoactive. She exhibits distension.There is no abdominal tenderness. There is no rebound and no guarding.                 Neurological: She is alert and oriented to person, place, and time.    Skin: Skin is warm and dry.    Psychiatric: She has a normal mood and affect. Her behavior is normal. Thought content normal.   MSK: +1 edema bilaterally     Laboratory:  Recent Labs   Lab 08/18/23  1304 08/23/23  0429 08/23/23  0917 08/24/23  0440    140  --  140   K 4.2 4.0  --  3.4*    100  --  103   CO2 25 27  --  26   BUN 11 16  --  15   CREATININE 0.9 0.9  --  0.7   GLU 94 114*  --  80   PROT 7.7 8.1  --  6.2   BILITOT 0.5 0.5  --  0.6   ALKPHOS 78 86  --  65   ALT 18 15  --  13   AST 25 24  --  20   MG  --   --  1.8 1.9   PHOS  --   --  3.3 3.1        Diagnostic Results:  CT: Reviewed    Imaging Results              XR NG/OG tube placement check, non-radiologist performed (Final result)  Result time 08/23/23 10:12:46   Procedure changed from XR Gastric tube check, non-radiologist performed     Final result by Cassy Agrawal MD (08/23/23 10:12:46)                   Impression:      Please see above.      Electronically signed by: Cassy Agrawal  Date:    08/23/2023  Time:    10:12               Narrative:    EXAMINATION:  XR NG/OG TUBE PLACEMENT CHECK, NON-RADIOLOGIST PERFORMED    CLINICAL HISTORY:  partial SBO;    TECHNIQUE:  Supine radiograph of the upper abdomen obtained to assess NG tube  placement.    COMPARISON:  None    FINDINGS:  Tip of the enteric tube projects over the stomach.    Lung bases are clear.                                        CT Abdomen Pelvis  Without Contrast (Final result)  Result time 08/23/23 05:18:54      Final result by Memo Meehan MD (08/23/23 05:18:54)                   Impression:      Abnormal small bowel pattern consistent with small bowel obstructive process, concerning for high-grade partial or complete obstruction, as discussed above.    Findings concerning for pulmonary and hepatic metastatic disease, as discussed above.    Small pleural effusion at the right lung base.    Urinary bladder wall thickening with perivesicular haziness, correlation for UTI/cystitis is needed.    Additional findings as above.    This report was flagged in Epic as abnormal.      Electronically signed by: Memo Meehan  Date:    08/23/2023  Time:    05:18               Narrative:    EXAMINATION:  CT ABDOMEN PELVIS WITHOUT CONTRAST    CLINICAL HISTORY:  Abdominal pain, acute, nonlocalized;    TECHNIQUE:  Low dose axial images, sagittal and coronal reformations were obtained from the lung bases to the pubic symphysis.  Intravenous contrast and oral contrast was not utilized, this diminishes the sensitivity of the examination.    COMPARISON:  CT examination of the abdomen and pelvis July 21, 2023    FINDINGS:  There is a small pleural effusion at the right lung base.  There are multiple small pulmonary nodules at the lung bases, this may relate to intrathoracic metastatic disease.    There is a small hiatal hernia noted.  The stomach demonstrates nonspecific appearance of moderate distention with fluid, air and ingested material.  There are dilated small bowel loops throughout the abdomen and pelvis with appearance of interloop edema and inflammatory change.  The distal small bowel including the terminal ileum appears decompressed.  It is difficult to delineate the specific point  of transition, however thought likely within the central abdomen as seen on axial images 82 through 105.  In the central abdominal mesentery at this level there are multiple prominent lymph nodes noted as well.  Given the transition from prominently dilated small bowel to decompressed distal small bowel the findings are concerning for high-grade partial or complete obstruction.  There is no evidence for pneumatosis, there is no portal venous air, mesenteric venous air or free intraperitoneal air.    The appendix is not identified.  There is mild prominence of the colon throughout its course with air and stool without inflammatory or obstructive pattern of the colon.    Hepatic hypodensities are noted, some of which may relate to cysts although not well evaluated on this examination there are additional areas of ill-defined hypodensity that are concerning for hepatic metastatic disease.    There is no evidence for acute process of the pancreas, spleen or adrenal glands.  There is no evidence for hydronephrosis or obstructive uropathy or perinephric inflammatory change bilaterally.  The abdominal aorta demonstrates atherosclerotic change, appears normal in caliber otherwise not optimally evaluated on this exam.  The urinary bladder is decompressed, wall thickening greater than expected for decompressed appearance is noted, correlation for UTI/cystitis is needed.    The osseous structures demonstrate chronic change, there is diminished mineralization and degenerative change noted.                                     Assessment/Plan:     Najma Hung is a 68 y.o. 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    Partial SBO:   - NGT removed around 0330; output overnight was 450 cc   - Will plan to hold NGT and replace if experiencing nausea or vomiting   - CT: Abnormal small bowel pattern consistent with small bowel  obstructive process, concerning for high-grade partial or complete obstruction. Findings concerning for pulmonary and hepatic metastatic disease. Small pleural effusion at the right lung base.  - Antiemetics: Zofran IV PRN, Compazine IV PRN  - Diet NPO +  cc/hr  - CBC stable  - CMP: K 3.4 (replaced), Cr 0.7, Mag 1.6 (replaced), Phos 2.7  - Pain control: Dilaudid PRN  - Daily CMP/Mg/Phos: Will replace electrolytes PRN with goal K >4.0, Mg > 2,  Phos >3  - Pepcid BID  - Declined dexamethasone   - Will monitor throughout the day with possible gastrografin to be completed      Hypertension (not on medication)  - Hydral PRN    Stage IIIB high grade serous ovarian cancer   - See Gyn Oncology history above  - On Lynparza BID. Will hold at this time.   - Lovenox daily for VTE prophylaxis     CKD 2  - Baseline Cr 0.9.   - Withholding nephrotoxic agents  - Daily CMP    OAB (not on medication)  - Asymptomatic  - Will continue to monitor     Leslee Hagan MD/MPH  OB/GYN PGY-3  [facility]

## 2023-08-26 VITALS
BODY MASS INDEX: 18.96 KG/M2 | OXYGEN SATURATION: 95 % | TEMPERATURE: 99 F | HEART RATE: 74 BPM | HEIGHT: 66 IN | SYSTOLIC BLOOD PRESSURE: 162 MMHG | DIASTOLIC BLOOD PRESSURE: 70 MMHG | WEIGHT: 118 LBS | RESPIRATION RATE: 16 BRPM

## 2023-08-26 LAB
ALBUMIN SERPL BCP-MCNC: 3.2 G/DL (ref 3.5–5.2)
ALP SERPL-CCNC: 63 U/L (ref 55–135)
ALT SERPL W/O P-5'-P-CCNC: 12 U/L (ref 10–44)
ANION GAP SERPL CALC-SCNC: 16 MMOL/L (ref 8–16)
AST SERPL-CCNC: 22 U/L (ref 10–40)
BILIRUB SERPL-MCNC: 0.5 MG/DL (ref 0.1–1)
BUN SERPL-MCNC: 11 MG/DL (ref 8–23)
CALCIUM SERPL-MCNC: 8 MG/DL (ref 8.7–10.5)
CHLORIDE SERPL-SCNC: 105 MMOL/L (ref 95–110)
CO2 SERPL-SCNC: 15 MMOL/L (ref 23–29)
CREAT SERPL-MCNC: 0.6 MG/DL (ref 0.5–1.4)
EST. GFR  (NO RACE VARIABLE): >60 ML/MIN/1.73 M^2
GLUCOSE SERPL-MCNC: 63 MG/DL (ref 70–110)
MAGNESIUM SERPL-MCNC: 1.6 MG/DL (ref 1.6–2.6)
PHOSPHATE SERPL-MCNC: 2.3 MG/DL (ref 2.7–4.5)
POTASSIUM SERPL-SCNC: 3.6 MMOL/L (ref 3.5–5.1)
PROT SERPL-MCNC: 5.9 G/DL (ref 6–8.4)
SODIUM SERPL-SCNC: 136 MMOL/L (ref 136–145)

## 2023-08-26 PROCEDURE — 84100 ASSAY OF PHOSPHORUS: CPT

## 2023-08-26 PROCEDURE — 36415 COLL VENOUS BLD VENIPUNCTURE: CPT

## 2023-08-26 PROCEDURE — 63600175 PHARM REV CODE 636 W HCPCS: Performed by: GENERAL PRACTICE

## 2023-08-26 PROCEDURE — 83735 ASSAY OF MAGNESIUM: CPT

## 2023-08-26 PROCEDURE — 25000003 PHARM REV CODE 250: Performed by: STUDENT IN AN ORGANIZED HEALTH CARE EDUCATION/TRAINING PROGRAM

## 2023-08-26 PROCEDURE — 99238 HOSP IP/OBS DSCHRG MGMT 30/<: CPT | Mod: ,,, | Performed by: OBSTETRICS & GYNECOLOGY

## 2023-08-26 PROCEDURE — 80053 COMPREHEN METABOLIC PANEL: CPT

## 2023-08-26 PROCEDURE — 99238 PR HOSPITAL DISCHARGE DAY,<30 MIN: ICD-10-PCS | Mod: ,,, | Performed by: OBSTETRICS & GYNECOLOGY

## 2023-08-26 RX ORDER — SODIUM,POTASSIUM PHOSPHATES 280-250MG
1 POWDER IN PACKET (EA) ORAL ONCE
Status: COMPLETED | OUTPATIENT
Start: 2023-08-26 | End: 2023-08-26

## 2023-08-26 RX ADMIN — SODIUM CHLORIDE, POTASSIUM CHLORIDE, SODIUM LACTATE AND CALCIUM CHLORIDE: 600; 310; 30; 20 INJECTION, SOLUTION INTRAVENOUS at 04:08

## 2023-08-26 RX ADMIN — POTASSIUM & SODIUM PHOSPHATES POWDER PACK 280-160-250 MG 1 PACKET: 280-160-250 PACK at 09:08

## 2023-08-26 NOTE — PROGRESS NOTES
Gynecologic Oncology  Progress Note    Patient Name: Najma Hung  MRN: [id number]  Admission Date: 2023  Primary Care Provider: Neymar Cash III, MD   Principal Problem: Partial small bowel obstruction    Subjective:     Chief Complaint/Reason for Admission: Abdominal pain    History of Present Illness:   Najma Hung is a 68 y.o. 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 Patient states she has been having chronic abdominal pain for the past 3-4 months while on chemotherapy but reports over the past few days it has gotten worse. She has not taken her Lynparaza in the last two days. She states the pain is similar to when she was admitted last month (2023-2023). She has tried taking tylenol and Zofran with no relief. Patient endorses having a BM at home. She reports vomiting while in the ED but was not having any previous episodes of vomiting. She denies diarrhea or constipation.    She also reports left chest wall pain from pulling a muscle while doing yoga. She has been taking Tylenol with minimal relief.         Oncology History:   2020 Imaging Significant Finding   Pelvic US with multicystic enlarged ovaries bilaterally, largest 8cm, some with mural nodularity.     2020 Imaging Significant Finding   CT A/P with large (12.4 x 9.2 cm), complex, multi cystic mass in pelvis with mural nodularity and numerous septations. Numerous hypodensities in liver, favor hepatic cysts.     2020- Elap, BSO, radical cytoreduction, supracolic omentectomy, bilateral ureterolysis, staging biopsies, bilateral pelvic/para-aortic LAD, striping of pelvic/bladder peritoneum, and cystoscopy with Dr. Olivier. 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 Ovarian surface ruptured bilaterally. LVSI in right ovary. Metastatic disease in pelvic peritoneum, omentum,  "and 2/8 pelvic lymph nodes.     CARUS testing 7/25/20 specimen: HER2/Adrian negative (0), HRD negative, KRAS amplified, TP53 pathogenic variant, Microstellite stable, Mmr proficient, TMB low, BRCA1 and BRCA2 wildtype, ER negative, FOLR1 negative, PDL1 negative     -13. (Prior to chemo)       8/10/2020- 1/28/2021 chemotherapy.   8/20/2020- Course #1 of Carbo/Taxol   9/10/20- Course #2 Carbo/Taxotere  10/15/2020 - unable to tolerate combination chemo; changed to single agent carboplatin  2/10/2020 - dose reduce carboplatin to 90% - course #4 carboplatin  1/28/2021- dose reduce carboplatin to 80%; course #5 carboplatin (delayed due to GI issues)     9/8/2020- [facility] My Risk genetic testing, NEGATIVE     -56.6 (completion of chemo)      4/19/21- PET scan showed:  No evidence for FDG avid metastasis or recurrence.     5/5/21- Patient decided not to go on PARP     5/17/21- [employer] CDx testing: POSITIVE     11/29/21-  = 70.7     12/15/21- PET scan showed:  No evidence for FDG avid metastasis or recurrence     2/22: -529      2/17/22- CT abdomen and pelvis showed:  1. Multiple subcentimeter hypodensities throughout the liver suspicious for multifocal hepatic metastatic disease.  **I reviewed the CT scan from [facility] dated 6/2020 and compared it to [facility] CT scan dated 2/2022 with radiologist Dr. Soares. There are virtually the same numerous hepatic cysts seen in 6/2020 - 5-7 cysts all under 10mm, each cyst measures within 1-2 mm compared to the cysts diameter on current scan. These are "stable" cysts over 1+ years suggesting likely benign nature. Also, PET scan from Dec 2021 showed no FDG avid lesions. Najma decided to postpone liver biopsy and repeat  in 1 month      continued to rise 177>241.3      5/25/2022:  continues to rise >200. Decided to proceed with re-treatment with single agent carboplatin. Course #1 carboplatin with neulasta     8/22 PET: No evidence of FDG met. " disease. liver lesion not FDG avid.  Indeterminate foci of FDG in lower pelvis.       355 (8/2022) >1155 (11/22)      11/22 PET: Interval development of FDG avid pericapsular hepatic and splenic implants as well as FDG avid peritoneal and mesenteric soft tissue metastatic deposits.      12/2022- Doxil and Carbo.     4/23 PET- Interval positive response to treatment. Decrease and resolution of some of the mesenteric and peritoneal implants. Decreased FDG activity of hepatic pericapsular subcapsular metastatic disease. Resolution of previously seen. Splenic implant      4/23: -500      6/1/2023 Began Lynparza 200mg BID      6/2023:-445      6/28/2023: - 513      7/23- PET: 1. Progression of disease with increased degree of hypermetabolic activity involving the known serosal implants as well as new serosal implant adjacent to the spleen.  2. Concern for new metastatic nodule within the middle lobe     Oncology Treatment Plan:   Lynparza [facility]    Hospital Course:  08/23/2024: Admitted with malignant small bowel obstruction. NGT placed. NPO.  08/24/2023: NGT with 650cc output. Abdominal distention improved, not yet passing flatus or having BM. Continue NGT and NPO.   08/25/2023: NGT out overnight. Gastrograffin study completed with no obstruction. Passing flatus.  08/26/2023: Continued passage of flatus, having bowel movements. Anticipate discharge today.    Interval History:   Completed gastrograffin study overnight with no obstruction. Advanced to clears without issue. Denies nausea/vomiting. Continues to pass flatus and have bowel movements. Voiding and ambulating without difficulty. Pain well controlled.     Objective:     Vital Signs (Most Recent):  Temp: 98.3 °F (36.8 °C) (08/24/23 0503)  Pulse: 76 (08/24/23 0503)  Resp: 18 (08/24/23 0503)  BP: (!) 151/66 (08/24/23 0503)  SpO2: (!) 93 % (08/24/23 0503) Vital Signs (24h Range):  Temp:  [97.8 °F (36.6 °C)-98.9 °F (37.2 °C)] 98.3 °F (36.8  °C)  Pulse:  [] 76  Resp:  [11-23] 18  SpO2:  [93 %-97 %] 93 %  BP: (151-204)/(66-86) 151/66     Weight: 53.5 kg (118 lb)  Body mass index is 19.05 kg/m².    Physical Exam:   Constitutional: She is oriented to person, place, and time. She appears well-developed and well-nourished.    HENT:   Head: Normocephalic and atraumatic.    Eyes: EOM are normal.     Cardiovascular:  Normal rate.             Pulmonary/Chest: Effort normal. No respiratory distress.      Abdominal: Bowel sounds are present. She exhibits no distention.There is no abdominal tenderness. There is no rebound and no guarding.                 Neurological: She is alert and oriented to person, place, and time.    Skin: Skin is warm and dry.    Psychiatric: She has a normal mood and affect. Her behavior is normal. Thought content normal.   MSK: +1 edema bilaterally     Laboratory:  Recent Labs   Lab 08/18/23  1304 08/23/23  0429 08/23/23  0917 08/24/23  0440    140  --  140   K 4.2 4.0  --  3.4*    100  --  103   CO2 25 27  --  26   BUN 11 16  --  15   CREATININE 0.9 0.9  --  0.7   GLU 94 114*  --  80   PROT 7.7 8.1  --  6.2   BILITOT 0.5 0.5  --  0.6   ALKPHOS 78 86  --  65   ALT 18 15  --  13   AST 25 24  --  20   MG  --   --  1.8 1.9   PHOS  --   --  3.3 3.1        Diagnostic Results:  Imaging Results              XR NG/OG tube placement check, non-radiologist performed (Final result)  Result time 08/23/23 10:12:46   Procedure changed from XR Gastric tube check, non-radiologist performed     Final result by Cassy Agrawal MD (08/23/23 10:12:46)                   Impression:      Please see above.      Electronically signed by: Cassy Agrawal  Date:    08/23/2023  Time:    10:12               Narrative:    EXAMINATION:  XR NG/OG TUBE PLACEMENT CHECK, NON-RADIOLOGIST PERFORMED    CLINICAL HISTORY:  partial SBO;    TECHNIQUE:  Supine radiograph of the upper abdomen obtained to assess NG tube  placement.    COMPARISON:  None    FINDINGS:  Tip of the enteric tube projects over the stomach.    Lung bases are clear.                                        CT Abdomen Pelvis  Without Contrast (Final result)  Result time 08/23/23 05:18:54      Final result by Memo Meehan MD (08/23/23 05:18:54)                   Impression:      Abnormal small bowel pattern consistent with small bowel obstructive process, concerning for high-grade partial or complete obstruction, as discussed above.    Findings concerning for pulmonary and hepatic metastatic disease, as discussed above.    Small pleural effusion at the right lung base.    Urinary bladder wall thickening with perivesicular haziness, correlation for UTI/cystitis is needed.    Additional findings as above.    This report was flagged in Epic as abnormal.      Electronically signed by: Memo Meehan  Date:    08/23/2023  Time:    05:18               Narrative:    EXAMINATION:  CT ABDOMEN PELVIS WITHOUT CONTRAST    CLINICAL HISTORY:  Abdominal pain, acute, nonlocalized;    TECHNIQUE:  Low dose axial images, sagittal and coronal reformations were obtained from the lung bases to the pubic symphysis.  Intravenous contrast and oral contrast was not utilized, this diminishes the sensitivity of the examination.    COMPARISON:  CT examination of the abdomen and pelvis July 21, 2023    FINDINGS:  There is a small pleural effusion at the right lung base.  There are multiple small pulmonary nodules at the lung bases, this may relate to intrathoracic metastatic disease.    There is a small hiatal hernia noted.  The stomach demonstrates nonspecific appearance of moderate distention with fluid, air and ingested material.  There are dilated small bowel loops throughout the abdomen and pelvis with appearance of interloop edema and inflammatory change.  The distal small bowel including the terminal ileum appears decompressed.  It is difficult to delineate the specific point  of transition, however thought likely within the central abdomen as seen on axial images 82 through 105.  In the central abdominal mesentery at this level there are multiple prominent lymph nodes noted as well.  Given the transition from prominently dilated small bowel to decompressed distal small bowel the findings are concerning for high-grade partial or complete obstruction.  There is no evidence for pneumatosis, there is no portal venous air, mesenteric venous air or free intraperitoneal air.    The appendix is not identified.  There is mild prominence of the colon throughout its course with air and stool without inflammatory or obstructive pattern of the colon.    Hepatic hypodensities are noted, some of which may relate to cysts although not well evaluated on this examination there are additional areas of ill-defined hypodensity that are concerning for hepatic metastatic disease.    There is no evidence for acute process of the pancreas, spleen or adrenal glands.  There is no evidence for hydronephrosis or obstructive uropathy or perinephric inflammatory change bilaterally.  The abdominal aorta demonstrates atherosclerotic change, appears normal in caliber otherwise not optimally evaluated on this exam.  The urinary bladder is decompressed, wall thickening greater than expected for decompressed appearance is noted, correlation for UTI/cystitis is needed.    The osseous structures demonstrate chronic change, there is diminished mineralization and degenerative change noted.                                      Assessment/Plan:     Najma Hung is a 68 y.o. 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    Partial SBO:   - NGT replaced for gastrograffin study, then self-removed  - CT: Abnormal small bowel pattern consistent with small bowel obstructive process, concerning for high-grade partial or complete obstruction.  Findings concerning for pulmonary and hepatic metastatic disease. Small pleural effusion at the right lung base.  - KUB (s/p gastrograffin): Contrast throughout bowels, no evidence of obstruction.  - Antiemetics: Zofran IV PRN, Compazine IV PRN  - Diet CLD, will advance to soft  - CBC stable  - CMP: K 3.6, Cr 0.6, Mag 1.6, Phos 2.3 (replaced)  - Pain control: Dilaudid PRN  - Daily CMP/Mg/Phos: Will replace electrolytes PRN with goal K >4.0, Mg > 2,  Phos >3  - Pepcid BID, currently declining  - Declined dexamethasone       Hypertension (not on medication)  - Hydral PRN    Stage IIIB high grade serous ovarian cancer   - See Gyn Oncology history above  - On Lynparza BID. Will hold at this time.   - Lovenox daily for VTE prophylaxis     CKD 2  - Baseline Cr 0.9.   - Withholding nephrotoxic agents  - Daily CMP    OAB (not on medication)  - Asymptomatic  - Will continue to monitor     Dispo: Anticipate discharge today.    Rayna Viera MD/MPH  OB/GYN PGY4

## 2023-08-26 NOTE — DISCHARGE SUMMARY
Discharge Summary  Gynecology Oncology      Admit Date: 2023    Discharge Date and Time: 2023 8:27 AM    Attending Physician: Jerrell Hodgson MD    Principal Diagnoses: Partial small bowel obstruction    Active Hospital Problems    Diagnosis  POA    *Partial small bowel obstruction [K56.600]  Yes    Hypertension [I10]  Yes    Ovarian cancer [C56.9]  Yes    Chronic kidney disease (CKD) [N18.9]  Yes    OAB (overactive bladder) [N32.81]  Yes      Resolved Hospital Problems   No resolved problems to display.       Procedures: * No surgery found *    Discharged Condition: good    Hospital Course:   Najma Hung is a 68 y.o. y.o.  female who presented on 2023 for nausea, vomiting, and abdominal pain. She was admitted for SBO and NGT was placed.    2024: Admitted with malignant small bowel obstruction. NGT placed. NPO.  2023: NGT with 650cc output. Abdominal distention improved, not yet passing flatus or having BM. Continue NGT and NPO.   2023: NGT out overnight. Gastrograffin study completed with no obstruction. Passing flatus.  2023: Continued passage of flatus, having bowel movements. Anticipate discharge today.    On day of discharge, patient was urinating, ambulating, and tolerating a liquid diet without difficulty. Pain was well controlled on PO medication. She was discharged home in stable condition with instructions to follow up with Dr. Hodgson on 23.     Consults: None    Significant Diagnostic Studies:  Recent Labs   Lab 23  0429   WBC 4.99   HGB 12.0   HCT 35.7*   MCV 96           Treatments:   NGT  Gastrograffin Study    Disposition: Home or Self Care    Patient Instructions:   Current Discharge Medication List        CONTINUE these medications which have NOT CHANGED    Details   acetaminophen (TYLENOL) 650 MG TbSR Take 1 tablet (650 mg total) by mouth every 6 (six) hours as needed (pain).  Qty: 30 tablet, Refills: 1      digestive enzymes Cap  Take by mouth.      ondansetron (ZOFRAN) 4 MG tablet Take 4 mg by mouth.      b complex vitamins capsule Take 1 capsule by mouth.      cholecalciferol, vitamin D3, (VITAMIN D3) 25 mcg (1,000 unit) capsule Take 7,000 Units by mouth.      LIDOcaine-prilocaine (EMLA) cream Please apply to area 1 hour prior to procedure.      olaparib 100 mg Tab Take 200 mg by mouth 2 (two) times a day.  Qty: 120 tablet, Refills: 6    Associated Diagnoses: Ovarian cancer, bilateral      promethazine (PHENERGAN) 12.5 MG Tab Take 1 tablet (12.5 mg total) by mouth every 6 (six) hours as needed (Nausea/vomiting).  Qty: 15 tablet, Refills: 0             Discharge Procedure Orders   Diet Adult Regular     No driving until:   Order Comments: No driving until not taking narcotic pain medication.     Notify your health care provider if you experience any of the following:  temperature >100.4     Notify your health care provider if you experience any of the following:  persistent nausea and vomiting or diarrhea     Notify your health care provider if you experience any of the following:  severe uncontrolled pain     Notify your health care provider if you experience any of the following:  difficulty breathing or increased cough     Notify your health care provider if you experience any of the following:  severe persistent headache     Notify your health care provider if you experience any of the following:  worsening rash     Notify your health care provider if you experience any of the following:  persistent dizziness, light-headedness, or visual disturbances     Notify your health care provider if you experience any of the following:  increased confusion or weakness     Notify your health care provider if you experience any of the following:   Order Comments: Heavy vaginal bleeding saturating more than 1 pad per hr for at least consecutive 2 hrs.     Activity as tolerated        Follow-up Information       Jerrell Hodgson MD. Schedule an  appointment as soon as possible for a visit on 9/7/2023.    Specialties: Gynecologic Oncology, Gynecology, Oncology  Why: Follow up  Contact information:  [facility] [address]  [facility] [id number] [phone]                             Rayna Viera MD/MPH  OB/GYN PGY4

## 2023-08-26 NOTE — PLAN OF CARE
Problem: Adult Inpatient Plan of Care  Goal: Plan of Care Review  8/26/2023 0307 by Leslye Good, RN  Outcome: Ongoing, Progressing  Flowsheets (Taken 8/26/2023 0307)  Plan of Care Reviewed With: patient  8/26/2023 0306 by Leslye Good, RN  Outcome: Ongoing, Progressing

## 2023-08-26 NOTE — PLAN OF CARE
08/26/23 0913   Medicare Message   Important Message from Medicare regarding Discharge Appeal Rights Explained to patient/caregiver;Given to patient/caregiver;Signed/date by patient/caregiver   Date IMM was signed 08/26/23   Time IMM was signed 0900

## 2023-08-26 NOTE — PLAN OF CARE
08/26/23 0912   Final Note   Assessment Type Final Discharge Note   Anticipated Discharge Disposition Home   Hospital Resources/Appts/Education Provided Provided patient/caregiver with written discharge plan information;Appointments scheduled and added to AVS   Post-Acute Status   Discharge Delays None known at this time

## 2023-08-26 NOTE — PROGRESS NOTES
AVS reviewed with pt and spouse at bedside. PIV discontinued. No medications to be delivered to bedside. Pt to discharge to home with spouse.

## 2023-08-28 ENCOUNTER — PATIENT MESSAGE (OUTPATIENT)
Dept: HEMATOLOGY/ONCOLOGY | Facility: CLINIC | Age: 68
End: 2023-08-28
Payer: MEDICARE

## 2023-08-29 DIAGNOSIS — C56.9 MALIGNANT NEOPLASM OF OVARY, UNSPECIFIED LATERALITY: Primary | ICD-10-CM

## 2023-08-29 RX ORDER — ACETAMINOPHEN 650 MG/1
650 SUPPOSITORY RECTAL EVERY 8 HOURS PRN
Qty: 20 SUPPOSITORY | Refills: 0 | Status: SHIPPED | OUTPATIENT
Start: 2023-08-29 | End: 2023-09-12 | Stop reason: SDUPTHER

## 2023-09-06 ENCOUNTER — LAB VISIT (OUTPATIENT)
Dept: LAB | Facility: OTHER | Age: 68
End: 2023-09-06
Attending: OBSTETRICS & GYNECOLOGY
Payer: MEDICARE

## 2023-09-06 DIAGNOSIS — C56.3 MALIGNANT NEOPLASM OF BOTH OVARIES: ICD-10-CM

## 2023-09-06 LAB
ALBUMIN SERPL BCP-MCNC: 4.1 G/DL (ref 3.5–5.2)
ALP SERPL-CCNC: 95 U/L (ref 55–135)
ALT SERPL W/O P-5'-P-CCNC: 20 U/L (ref 10–44)
ANION GAP SERPL CALC-SCNC: 10 MMOL/L (ref 8–16)
AST SERPL-CCNC: 26 U/L (ref 10–40)
BILIRUB SERPL-MCNC: 0.5 MG/DL (ref 0.1–1)
BUN SERPL-MCNC: 13 MG/DL (ref 8–23)
CALCIUM SERPL-MCNC: 9.8 MG/DL (ref 8.7–10.5)
CANCER AG125 SERPL-ACNC: 1368 U/ML (ref 0–30)
CHLORIDE SERPL-SCNC: 101 MMOL/L (ref 95–110)
CO2 SERPL-SCNC: 28 MMOL/L (ref 23–29)
CREAT SERPL-MCNC: 0.9 MG/DL (ref 0.5–1.4)
ERYTHROCYTE [DISTWIDTH] IN BLOOD BY AUTOMATED COUNT: 15.4 % (ref 11.5–14.5)
EST. GFR  (NO RACE VARIABLE): >60 ML/MIN/1.73 M^2
GLUCOSE SERPL-MCNC: 124 MG/DL (ref 70–110)
HCT VFR BLD AUTO: 33.3 % (ref 37–48.5)
HGB BLD-MCNC: 10.9 G/DL (ref 12–16)
IMM GRANULOCYTES # BLD AUTO: 0 K/UL (ref 0–0.04)
MCH RBC QN AUTO: 32 PG (ref 27–31)
MCHC RBC AUTO-ENTMCNC: 32.7 G/DL (ref 32–36)
MCV RBC AUTO: 98 FL (ref 82–98)
NEUTROPHILS # BLD AUTO: 1.8 K/UL (ref 1.8–7.7)
PLATELET # BLD AUTO: 231 K/UL (ref 150–450)
PMV BLD AUTO: 8.7 FL (ref 9.2–12.9)
POTASSIUM SERPL-SCNC: 3.7 MMOL/L (ref 3.5–5.1)
PROT SERPL-MCNC: 7.5 G/DL (ref 6–8.4)
RBC # BLD AUTO: 3.41 M/UL (ref 4–5.4)
SODIUM SERPL-SCNC: 139 MMOL/L (ref 136–145)
WBC # BLD AUTO: 2.76 K/UL (ref 3.9–12.7)

## 2023-09-06 PROCEDURE — 86304 IMMUNOASSAY TUMOR CA 125: CPT | Performed by: OBSTETRICS & GYNECOLOGY

## 2023-09-06 PROCEDURE — 80053 COMPREHEN METABOLIC PANEL: CPT | Performed by: OBSTETRICS & GYNECOLOGY

## 2023-09-06 PROCEDURE — 36415 COLL VENOUS BLD VENIPUNCTURE: CPT | Performed by: OBSTETRICS & GYNECOLOGY

## 2023-09-06 PROCEDURE — 85027 COMPLETE CBC AUTOMATED: CPT | Performed by: OBSTETRICS & GYNECOLOGY

## 2023-09-07 ENCOUNTER — OFFICE VISIT (OUTPATIENT)
Dept: GYNECOLOGIC ONCOLOGY | Facility: CLINIC | Age: 68
End: 2023-09-07
Payer: MEDICARE

## 2023-09-07 VITALS
BODY MASS INDEX: 18.72 KG/M2 | DIASTOLIC BLOOD PRESSURE: 63 MMHG | HEART RATE: 83 BPM | SYSTOLIC BLOOD PRESSURE: 135 MMHG | WEIGHT: 115.94 LBS

## 2023-09-07 DIAGNOSIS — C56.3 MALIGNANT NEOPLASM OF BOTH OVARIES: Primary | ICD-10-CM

## 2023-09-07 DIAGNOSIS — K56.600 PARTIAL SMALL BOWEL OBSTRUCTION: ICD-10-CM

## 2023-09-07 DIAGNOSIS — Z51.11 ENCOUNTER FOR ANTINEOPLASTIC CHEMOTHERAPY: ICD-10-CM

## 2023-09-07 PROCEDURE — 99215 PR OFFICE/OUTPT VISIT, EST, LEVL V, 40-54 MIN: ICD-10-PCS | Mod: S$PBB,,, | Performed by: OBSTETRICS & GYNECOLOGY

## 2023-09-07 PROCEDURE — 99999 PR PBB SHADOW E&M-EST. PATIENT-LVL III: CPT | Mod: PBBFAC,,, | Performed by: OBSTETRICS & GYNECOLOGY

## 2023-09-07 PROCEDURE — 99999 PR PBB SHADOW E&M-EST. PATIENT-LVL III: ICD-10-PCS | Mod: PBBFAC,,, | Performed by: OBSTETRICS & GYNECOLOGY

## 2023-09-07 PROCEDURE — 99215 OFFICE O/P EST HI 40 MIN: CPT | Mod: S$PBB,,, | Performed by: OBSTETRICS & GYNECOLOGY

## 2023-09-07 PROCEDURE — 99213 OFFICE O/P EST LOW 20 MIN: CPT | Mod: PBBFAC | Performed by: OBSTETRICS & GYNECOLOGY

## 2023-09-07 NOTE — PROGRESS NOTES
Subjective:      Patient ID: Najma Hung is a 68 y.o. female.    Chief Complaint: Follow-up    Treatment History  Stage IIIC high-grade serous ovarian cancer  Xlap/BSO/Omentectomy/Pelvic and PA nodes/Peritoneal stripping and debulking 6/20 by Dr. Olivier  T/C x 2, then Carbo x 3 due to patient intolerance of treatment completed 1/28/21  Elected to not pursue maintenance PARPi  CARIS testing negative  Germline normal, CDX positive  Recurrence documented 5/22, received Carbo + neulasta x 1  No treatment until 11/22, when multifocal progression documented on PET  Doxil/Carbo x 4 completed 4/23  Delayed PARPi therapy until starting Lynparza 200 BID 6/23  Progression of disease with recent hospitalization x 2 for partial SBO    HPI  Her today for hospital f/u and discussion of treatment plan moving forward.  Labs done yesterday look overall ok.  CA-125 has increased to 1368.  Review of Systems   Constitutional:  Negative for activity change, appetite change, chills, fatigue and fever.   HENT:  Negative for hearing loss, mouth sores, nosebleeds, sore throat and tinnitus.    Eyes:  Negative for visual disturbance.   Respiratory:  Negative for cough, chest tightness, shortness of breath and wheezing.    Cardiovascular:  Negative for chest pain and leg swelling.   Gastrointestinal:  Negative for abdominal distention, abdominal pain, blood in stool, constipation, diarrhea, nausea and vomiting.   Genitourinary:  Negative for dysuria, flank pain, frequency, hematuria, pelvic pain, vaginal bleeding, vaginal discharge and vaginal pain.   Musculoskeletal:  Negative for arthralgias and back pain.   Skin:  Negative for rash.   Neurological:  Negative for dizziness, seizures, syncope, weakness and numbness.   Hematological:  Does not bruise/bleed easily.   Psychiatric/Behavioral:  Negative for confusion and sleep disturbance. The patient is not nervous/anxious.        Objective:   Physical Exam:   Constitutional: She is  oriented to person, place, and time. She appears well-developed and well-nourished. No distress.    HENT:   Head: Normocephalic and atraumatic.    Eyes: No scleral icterus.     Cardiovascular:       Exam reveals no cyanosis and no edema.        Pulmonary/Chest: Effort normal. No respiratory distress. She exhibits no tenderness.        Abdominal: Soft. She exhibits no distension, no fluid wave and no mass. There is no abdominal tenderness. There is no rebound and no guarding. No hernia.             Musculoskeletal: Normal range of motion and moves all extremeties. No edema.      Lymphadenopathy:     She has no cervical adenopathy.    Neurological: She is alert and oriented to person, place, and time.    Skin: Skin is warm and dry. No cyanosis. No pallor.    Psychiatric: She has a normal mood and affect. Her behavior is normal.       Assessment:     1. Malignant neoplasm of both ovaries    2. Encounter for antineoplastic chemotherapy        Plan:        Long discussion with her and her  regarding recent admissions, and CT suggesting disease progression.  She has traditionally been platinum-responsive, so am leaning towards retreatment with Doxil/Carbo versus [place]/Carbo/Avastin based on [facility] data.  They would like to pursue G/C/A.  The R/B/I of chemotherapy were explained to the patient including alopecia, N/V, BM suppression, fatigue, need for transfusion and peripheral neuropathy.  The patient voiced understanding, all questions were answered and consents were signed.

## 2023-09-11 ENCOUNTER — TELEPHONE (OUTPATIENT)
Dept: GYNECOLOGIC ONCOLOGY | Facility: CLINIC | Age: 68
End: 2023-09-11
Payer: MEDICARE

## 2023-09-12 ENCOUNTER — TELEPHONE (OUTPATIENT)
Dept: GYNECOLOGIC ONCOLOGY | Facility: CLINIC | Age: 68
End: 2023-09-12
Payer: MEDICARE

## 2023-09-12 DIAGNOSIS — C56.9 MALIGNANT NEOPLASM OF OVARY, UNSPECIFIED LATERALITY: ICD-10-CM

## 2023-09-12 DIAGNOSIS — Z51.11 ENCOUNTER FOR ANTINEOPLASTIC CHEMOTHERAPY: Primary | ICD-10-CM

## 2023-09-12 RX ORDER — ONDANSETRON 4 MG/1
4 TABLET, FILM COATED ORAL EVERY 12 HOURS PRN
Qty: 20 TABLET | Refills: 0 | Status: SHIPPED | OUTPATIENT
Start: 2023-09-12

## 2023-09-12 RX ORDER — ACETAMINOPHEN 650 MG/1
650 SUPPOSITORY RECTAL EVERY 8 HOURS PRN
Qty: 20 SUPPOSITORY | Refills: 0 | Status: SHIPPED | OUTPATIENT
Start: 2023-09-12

## 2023-09-12 RX ORDER — PROMETHAZINE HYDROCHLORIDE 12.5 MG/1
12.5 TABLET ORAL EVERY 6 HOURS PRN
Qty: 15 TABLET | Refills: 0 | Status: SHIPPED | OUTPATIENT
Start: 2023-09-12 | End: 2024-02-16 | Stop reason: SDUPTHER

## 2023-09-12 NOTE — TELEPHONE ENCOUNTER
----- Message from Randi Rocha sent at 9/12/2023  8:03 AM CDT -----  Regarding: returning call    Type:  Patient Returning Call    Who Called:YESSENIA MCDONALD [[id number]]    Who Left Message for Patient:J'me    Does the patient know what this is regarding?scheduling chemo    Best Call Back Number:[phone]    Additional Information:

## 2023-09-19 ENCOUNTER — NURSE TRIAGE (OUTPATIENT)
Dept: ADMINISTRATIVE | Facility: CLINIC | Age: 68
End: 2023-09-19
Payer: MEDICARE

## 2023-09-19 ENCOUNTER — TELEPHONE (OUTPATIENT)
Dept: GYNECOLOGIC ONCOLOGY | Facility: CLINIC | Age: 68
End: 2023-09-19
Payer: MEDICARE

## 2023-09-19 NOTE — TELEPHONE ENCOUNTER
"Pt has ovarian cancer. She has been hospitalized twice for bad pain in her intestines. Last hospitalization was August 26 per patient. She is have pain, weak, unbalance and acid reflux. Pain is throughout her intestines that comes and goes 5/10. No difficulty breathing no chest pain. Loose stool and constipation. Pain is moderate and has been constant for more than 2 hours. Care advice recommend pt is seen within 4 hours. Please call pt in reference to an appointment.   Reason for Disposition   [1] MILD-MODERATE pain AND [2] constant AND [3] present > 2 hours    Additional Information   Negative: Shock suspected (e.g., cold/pale/clammy skin, too weak to stand, low BP, rapid pulse)   Negative: Difficult to awaken or acting confused (e.g., disoriented, slurred speech)   Negative: Passed out (i.e., lost consciousness, collapsed and was not responding)   Negative: Sounds like a life-threatening emergency to the triager   Negative: Chest pain   Negative: [1] SEVERE pain (e.g., excruciating) AND [2] present > 1 hour   Negative: [1] SEVERE pain AND [2] age > 60 years   Negative: [1] Vomiting AND [2] contains red blood or black ("coffee ground") material  (Exception: Few red streaks in vomit that only happened once.)   Negative: Blood in bowel movements  (Exception: Blood on surface of BM with constipation.)   Negative: Black or tarry bowel movements  (Exception: Chronic-unchanged black-grey BMs AND is taking iron pills or Pepto-Bismol.)   Negative: [1] Vomiting AND [2] contains bile (green color)   Negative: Patient sounds very sick or weak to the triager    Protocols used: Abdominal Pain - Female-A-AH    "

## 2023-09-19 NOTE — TELEPHONE ENCOUNTER
"Received call from patient requesting office visit re: abdominal pain. Per patient, her abdomen "is distended and hard which is similar to how I felt before my last hospitalization." Patient instructed to go to ED/Urgent Care for evaluation, as MD is not in clinic until 9/21. Patient would like to avoid ED- appt scheduled on 9/21. ED precautions provided and patient verbalized understanding.  "

## 2023-09-19 NOTE — TELEPHONE ENCOUNTER
OOC Rn   Dr. Jerrell Hodgson,  Traiged already by Sourav Momin.RN   See dr. Within 4 hours,  looking for  appt and has not heard from anyone.  Kevin Rn    send message to MAGDA Crockett RN. Answered.      Must go to ED for evaluation.   According to RN with Dr. Hodgson who is not clinic day today.   Transferred SC call to Rn of Dr. Hodgson.    Reason for Disposition   Nursing judgment    Protocols used: Information Only Call - No Triage-A-OH

## 2023-09-20 ENCOUNTER — LAB VISIT (OUTPATIENT)
Dept: LAB | Facility: OTHER | Age: 68
End: 2023-09-20
Attending: OBSTETRICS & GYNECOLOGY
Payer: MEDICARE

## 2023-09-20 DIAGNOSIS — C56.3 MALIGNANT NEOPLASM OF BOTH OVARIES: ICD-10-CM

## 2023-09-20 DIAGNOSIS — Z51.11 ENCOUNTER FOR ANTINEOPLASTIC CHEMOTHERAPY: ICD-10-CM

## 2023-09-20 LAB
ALBUMIN SERPL BCP-MCNC: 3.6 G/DL (ref 3.5–5.2)
ALP SERPL-CCNC: 102 U/L (ref 55–135)
ALT SERPL W/O P-5'-P-CCNC: 20 U/L (ref 10–44)
ANION GAP SERPL CALC-SCNC: 14 MMOL/L (ref 8–16)
AST SERPL-CCNC: 26 U/L (ref 10–40)
BACTERIA #/AREA URNS HPF: ABNORMAL /HPF
BILIRUB SERPL-MCNC: 0.3 MG/DL (ref 0.1–1)
BILIRUB UR QL STRIP: NEGATIVE
BUN SERPL-MCNC: 16 MG/DL (ref 8–23)
CALCIUM SERPL-MCNC: 8.7 MG/DL (ref 8.7–10.5)
CANCER AG125 SERPL-ACNC: 1455 U/ML (ref 0–30)
CHLORIDE SERPL-SCNC: 103 MMOL/L (ref 95–110)
CLARITY UR: CLEAR
CO2 SERPL-SCNC: 21 MMOL/L (ref 23–29)
COLOR UR: YELLOW
CREAT SERPL-MCNC: 0.9 MG/DL (ref 0.5–1.4)
ERYTHROCYTE [DISTWIDTH] IN BLOOD BY AUTOMATED COUNT: 13.8 % (ref 11.5–14.5)
EST. GFR  (NO RACE VARIABLE): >60 ML/MIN/1.73 M^2
GLUCOSE SERPL-MCNC: 96 MG/DL (ref 70–110)
GLUCOSE UR QL STRIP: NEGATIVE
HCT VFR BLD AUTO: 35.3 % (ref 37–48.5)
HGB BLD-MCNC: 11.4 G/DL (ref 12–16)
HGB UR QL STRIP: NEGATIVE
HYALINE CASTS #/AREA URNS LPF: 5 /LPF
IMM GRANULOCYTES # BLD AUTO: 0.02 K/UL (ref 0–0.04)
KETONES UR QL STRIP: ABNORMAL
LEUKOCYTE ESTERASE UR QL STRIP: ABNORMAL
MCH RBC QN AUTO: 32.4 PG (ref 27–31)
MCHC RBC AUTO-ENTMCNC: 32.3 G/DL (ref 32–36)
MCV RBC AUTO: 100 FL (ref 82–98)
MICROSCOPIC COMMENT: ABNORMAL
NEUTROPHILS # BLD AUTO: 2.6 K/UL (ref 1.8–7.7)
NITRITE UR QL STRIP: NEGATIVE
PH UR STRIP: 6 [PH] (ref 5–8)
PLATELET # BLD AUTO: 246 K/UL (ref 150–450)
PMV BLD AUTO: 8.7 FL (ref 9.2–12.9)
POTASSIUM SERPL-SCNC: 3.9 MMOL/L (ref 3.5–5.1)
PROT SERPL-MCNC: 6.6 G/DL (ref 6–8.4)
PROT UR QL STRIP: ABNORMAL
RBC # BLD AUTO: 3.52 M/UL (ref 4–5.4)
RBC #/AREA URNS HPF: 2 /HPF (ref 0–4)
SODIUM SERPL-SCNC: 138 MMOL/L (ref 136–145)
SP GR UR STRIP: 1.03 (ref 1–1.03)
SQUAMOUS #/AREA URNS HPF: 8 /HPF
URN SPEC COLLECT METH UR: ABNORMAL
UROBILINOGEN UR STRIP-ACNC: ABNORMAL EU/DL
WBC # BLD AUTO: 3.79 K/UL (ref 3.9–12.7)
WBC #/AREA URNS HPF: 26 /HPF (ref 0–5)

## 2023-09-20 PROCEDURE — 85027 COMPLETE CBC AUTOMATED: CPT | Performed by: OBSTETRICS & GYNECOLOGY

## 2023-09-20 PROCEDURE — 80053 COMPREHEN METABOLIC PANEL: CPT | Performed by: OBSTETRICS & GYNECOLOGY

## 2023-09-20 PROCEDURE — 36415 COLL VENOUS BLD VENIPUNCTURE: CPT | Performed by: OBSTETRICS & GYNECOLOGY

## 2023-09-20 PROCEDURE — 86304 IMMUNOASSAY TUMOR CA 125: CPT | Performed by: OBSTETRICS & GYNECOLOGY

## 2023-09-20 PROCEDURE — 81000 URINALYSIS NONAUTO W/SCOPE: CPT | Performed by: OBSTETRICS & GYNECOLOGY

## 2023-09-21 ENCOUNTER — OFFICE VISIT (OUTPATIENT)
Dept: GYNECOLOGIC ONCOLOGY | Facility: CLINIC | Age: 68
End: 2023-09-21
Payer: MEDICARE

## 2023-09-21 VITALS
WEIGHT: 115.06 LBS | SYSTOLIC BLOOD PRESSURE: 155 MMHG | DIASTOLIC BLOOD PRESSURE: 72 MMHG | BODY MASS INDEX: 18.57 KG/M2 | HEART RATE: 107 BPM

## 2023-09-21 DIAGNOSIS — Z51.11 ENCOUNTER FOR ANTINEOPLASTIC CHEMOTHERAPY: ICD-10-CM

## 2023-09-21 DIAGNOSIS — C56.9 MALIGNANT NEOPLASM OF OVARY, UNSPECIFIED LATERALITY: Primary | ICD-10-CM

## 2023-09-21 PROCEDURE — 99213 OFFICE O/P EST LOW 20 MIN: CPT | Mod: PBBFAC | Performed by: OBSTETRICS & GYNECOLOGY

## 2023-09-21 PROCEDURE — 99999 PR PBB SHADOW E&M-EST. PATIENT-LVL III: CPT | Mod: PBBFAC,,, | Performed by: OBSTETRICS & GYNECOLOGY

## 2023-09-21 PROCEDURE — 99999 PR PBB SHADOW E&M-EST. PATIENT-LVL III: ICD-10-PCS | Mod: PBBFAC,,, | Performed by: OBSTETRICS & GYNECOLOGY

## 2023-09-21 PROCEDURE — 99215 OFFICE O/P EST HI 40 MIN: CPT | Mod: S$PBB,,, | Performed by: OBSTETRICS & GYNECOLOGY

## 2023-09-21 PROCEDURE — 99215 PR OFFICE/OUTPT VISIT, EST, LEVL V, 40-54 MIN: ICD-10-PCS | Mod: S$PBB,,, | Performed by: OBSTETRICS & GYNECOLOGY

## 2023-09-21 RX ORDER — LORAZEPAM 0.5 MG/1
0.5 TABLET ORAL 2 TIMES DAILY
Qty: 60 TABLET | Refills: 0 | Status: SHIPPED | OUTPATIENT
Start: 2023-09-21 | End: 2023-11-16

## 2023-09-21 RX ORDER — SODIUM CHLORIDE 0.9 % (FLUSH) 0.9 %
10 SYRINGE (ML) INJECTION
Status: CANCELLED | OUTPATIENT
Start: 2023-09-21

## 2023-09-21 RX ORDER — DIPHENHYDRAMINE HYDROCHLORIDE 50 MG/ML
50 INJECTION INTRAMUSCULAR; INTRAVENOUS ONCE AS NEEDED
Status: CANCELLED | OUTPATIENT
Start: 2023-09-21

## 2023-09-21 RX ORDER — DIPHENHYDRAMINE HYDROCHLORIDE 50 MG/ML
12.5 INJECTION INTRAMUSCULAR; INTRAVENOUS ONCE AS NEEDED
Status: CANCELLED | OUTPATIENT
Start: 2023-09-21

## 2023-09-21 RX ORDER — EPINEPHRINE 0.3 MG/.3ML
0.3 INJECTION SUBCUTANEOUS ONCE AS NEEDED
Status: CANCELLED | OUTPATIENT
Start: 2023-09-21

## 2023-09-21 RX ORDER — HEPARIN 100 UNIT/ML
500 SYRINGE INTRAVENOUS
Status: CANCELLED | OUTPATIENT
Start: 2023-09-21

## 2023-09-21 NOTE — PROGRESS NOTES
Subjective:      Patient ID: Najma Hung is a 68 y.o. female.    Chief Complaint: Chemotherapy    Treatment History  Stage IIIC high-grade serous ovarian cancer  Xlap/BSO/Omentectomy/Pelvic and PA nodes/Peritoneal stripping and debulking 6/20 by Dr. Olivier  T/C x 2, then Carbo x 3 due to patient intolerance of treatment completed 1/28/21  Elected to not pursue maintenance PARPi  CARIS testing negative  Germline normal, CDX positive  Recurrence documented 5/22, received Carbo + neulasta x 1  No treatment until 11/22, when multifocal progression documented on PET  Doxil/Carbo x 4 completed 4/23  Delayed PARPi therapy until starting Lynparza 200 BID 6/23  Progression of disease with recent hospitalization x 2 for partial SBO    HPI  Her today prior to C1 of new [place]/Carbo/Malena regimen.  Still having intermittent abdominal pain.  Was worse a couple of days ago.  Labs reviewed and ok for treatment.  Review of Systems   Constitutional:  Negative for activity change, appetite change, chills, fatigue and fever.   HENT:  Negative for hearing loss, mouth sores, nosebleeds, sore throat and tinnitus.    Eyes:  Negative for visual disturbance.   Respiratory:  Negative for cough, chest tightness, shortness of breath and wheezing.    Cardiovascular:  Negative for chest pain and leg swelling.   Gastrointestinal:  Negative for abdominal distention, abdominal pain, blood in stool, constipation, diarrhea, nausea and vomiting.   Genitourinary:  Negative for dysuria, flank pain, frequency, hematuria, pelvic pain, vaginal bleeding, vaginal discharge and vaginal pain.   Musculoskeletal:  Negative for arthralgias and back pain.   Skin:  Negative for rash.   Neurological:  Negative for dizziness, seizures, syncope, weakness and numbness.   Hematological:  Does not bruise/bleed easily.   Psychiatric/Behavioral:  Negative for confusion and sleep disturbance. The patient is not nervous/anxious.        Objective:   Physical Exam:    Constitutional: She is oriented to person, place, and time. She appears well-developed and well-nourished. No distress.    HENT:   Head: Normocephalic and atraumatic.    Eyes: No scleral icterus.     Cardiovascular:       Exam reveals no cyanosis and no edema.        Pulmonary/Chest: Effort normal. No respiratory distress. She exhibits no tenderness.        Abdominal: Soft. She exhibits no distension, no fluid wave and no mass. There is no abdominal tenderness. There is no rebound and no guarding. No hernia.             Musculoskeletal: Normal range of motion and moves all extremeties. No edema.      Lymphadenopathy:     She has no cervical adenopathy.    Neurological: She is alert and oriented to person, place, and time.    Skin: Skin is warm and dry. No cyanosis. No pallor.    Psychiatric: She has a normal mood and affect. Her behavior is normal.       Assessment:     1. Malignant neoplasm of ovary, unspecified laterality    2. Encounter for antineoplastic chemotherapy        Plan:       Proceed with first cycle.  Benadryl dose decreased at her request.  Rx for Ativan sent to pharmacy.  RTC as sched

## 2023-09-22 ENCOUNTER — INFUSION (OUTPATIENT)
Dept: INFUSION THERAPY | Facility: HOSPITAL | Age: 68
End: 2023-09-22
Payer: MEDICARE

## 2023-09-22 VITALS
HEART RATE: 75 BPM | OXYGEN SATURATION: 96 % | SYSTOLIC BLOOD PRESSURE: 160 MMHG | DIASTOLIC BLOOD PRESSURE: 74 MMHG | RESPIRATION RATE: 20 BRPM | TEMPERATURE: 98 F | BODY MASS INDEX: 18.49 KG/M2 | HEIGHT: 66 IN | WEIGHT: 115.06 LBS

## 2023-09-22 DIAGNOSIS — C56.3 MALIGNANT NEOPLASM OF BOTH OVARIES: Primary | ICD-10-CM

## 2023-09-22 PROCEDURE — 96413 CHEMO IV INFUSION 1 HR: CPT

## 2023-09-22 PROCEDURE — 25000003 PHARM REV CODE 250: Performed by: OBSTETRICS & GYNECOLOGY

## 2023-09-22 PROCEDURE — 63600175 PHARM REV CODE 636 W HCPCS: Performed by: OBSTETRICS & GYNECOLOGY

## 2023-09-22 PROCEDURE — 96417 CHEMO IV INFUS EACH ADDL SEQ: CPT

## 2023-09-22 PROCEDURE — 96375 TX/PRO/DX INJ NEW DRUG ADDON: CPT

## 2023-09-22 PROCEDURE — A4216 STERILE WATER/SALINE, 10 ML: HCPCS | Performed by: OBSTETRICS & GYNECOLOGY

## 2023-09-22 PROCEDURE — 96367 TX/PROPH/DG ADDL SEQ IV INF: CPT

## 2023-09-22 RX ORDER — EPINEPHRINE 0.3 MG/.3ML
0.3 INJECTION SUBCUTANEOUS ONCE AS NEEDED
Status: DISCONTINUED | OUTPATIENT
Start: 2023-09-22 | End: 2023-09-22 | Stop reason: HOSPADM

## 2023-09-22 RX ORDER — HEPARIN 100 UNIT/ML
500 SYRINGE INTRAVENOUS
Status: DISCONTINUED | OUTPATIENT
Start: 2023-09-22 | End: 2023-09-22 | Stop reason: HOSPADM

## 2023-09-22 RX ORDER — DIPHENHYDRAMINE HYDROCHLORIDE 50 MG/ML
12.5 INJECTION INTRAMUSCULAR; INTRAVENOUS ONCE AS NEEDED
Status: DISCONTINUED | OUTPATIENT
Start: 2023-09-22 | End: 2023-09-22 | Stop reason: HOSPADM

## 2023-09-22 RX ORDER — SODIUM CHLORIDE 0.9 % (FLUSH) 0.9 %
10 SYRINGE (ML) INJECTION
Status: DISCONTINUED | OUTPATIENT
Start: 2023-09-22 | End: 2023-09-22 | Stop reason: HOSPADM

## 2023-09-22 RX ADMIN — GEMCITABINE HYDROCHLORIDE 1200 MG: 1 INJECTION, SOLUTION INTRAVENOUS at 12:09

## 2023-09-22 RX ADMIN — SODIUM CHLORIDE: 9 INJECTION, SOLUTION INTRAVENOUS at 10:09

## 2023-09-22 RX ADMIN — DEXAMETHASONE SODIUM PHOSPHATE 0.25 MG: 4 INJECTION, SOLUTION INTRA-ARTICULAR; INTRALESIONAL; INTRAMUSCULAR; INTRAVENOUS; SOFT TISSUE at 12:09

## 2023-09-22 RX ADMIN — BEVACIZUMAB-AWWB 790 MG: 400 INJECTION, SOLUTION INTRAVENOUS at 10:09

## 2023-09-22 RX ADMIN — CARBOPLATIN 295 MG: 10 INJECTION, SOLUTION INTRAVENOUS at 01:09

## 2023-09-22 RX ADMIN — APREPITANT 130 MG: 130 INJECTION, EMULSION INTRAVENOUS at 12:09

## 2023-09-22 RX ADMIN — Medication 10 ML: at 02:09

## 2023-09-22 RX ADMIN — HEPARIN 500 UNITS: 100 SYRINGE at 02:09

## 2023-09-22 NOTE — PLAN OF CARE
1420-Patient tolerated treatment well. Discharged without complaints or S/S of adverse event.   Instructed to call provider for any questions or concerns.

## 2023-09-22 NOTE — PLAN OF CARE
0919-Labs , hx, and medications reviewed, patient was seen by Md yesterday. Assessment completed, patient denies any open/unhealed wounds for avastin. Discussed plan of care with patient. Patient in agreement. Chair reclined and warm blanket and snack offered.

## 2023-09-25 ENCOUNTER — TELEPHONE (OUTPATIENT)
Dept: GYNECOLOGIC ONCOLOGY | Facility: CLINIC | Age: 68
End: 2023-09-25
Payer: MEDICARE

## 2023-09-25 DIAGNOSIS — E55.9 VITAMIN D DEFICIENCY, UNSPECIFIED: ICD-10-CM

## 2023-09-25 DIAGNOSIS — Z86.39 HISTORY OF VITAMIN D DEFICIENCY: Primary | ICD-10-CM

## 2023-09-25 NOTE — TELEPHONE ENCOUNTER
----- Message from Дмитрий Stevan sent at 9/25/2023  9:35 AM CDT -----  Regarding: Vitamin D  Name of Who is Calling:  Patient          What is the request in detail:  Patient would like to have a Vitamin D added to her blood work            Can the clinic reply by [facility]: Yes            [facility][phone]

## 2023-09-27 ENCOUNTER — TELEPHONE (OUTPATIENT)
Dept: GYNECOLOGIC ONCOLOGY | Facility: CLINIC | Age: 68
End: 2023-09-27
Payer: MEDICARE

## 2023-09-27 ENCOUNTER — LAB VISIT (OUTPATIENT)
Dept: LAB | Facility: OTHER | Age: 68
End: 2023-09-27
Attending: OBSTETRICS & GYNECOLOGY
Payer: MEDICARE

## 2023-09-27 ENCOUNTER — PATIENT MESSAGE (OUTPATIENT)
Dept: GYNECOLOGIC ONCOLOGY | Facility: CLINIC | Age: 68
End: 2023-09-27
Payer: MEDICARE

## 2023-09-27 DIAGNOSIS — Z51.11 ENCOUNTER FOR ANTINEOPLASTIC CHEMOTHERAPY: ICD-10-CM

## 2023-09-27 DIAGNOSIS — E55.9 VITAMIN D DEFICIENCY, UNSPECIFIED: ICD-10-CM

## 2023-09-27 DIAGNOSIS — Z86.39 HISTORY OF VITAMIN D DEFICIENCY: ICD-10-CM

## 2023-09-27 DIAGNOSIS — C56.3 MALIGNANT NEOPLASM OF BOTH OVARIES: ICD-10-CM

## 2023-09-27 LAB
ALBUMIN SERPL BCP-MCNC: 3.8 G/DL (ref 3.5–5.2)
ALP SERPL-CCNC: 120 U/L (ref 55–135)
ALT SERPL W/O P-5'-P-CCNC: 20 U/L (ref 10–44)
ANION GAP SERPL CALC-SCNC: 8 MMOL/L (ref 8–16)
AST SERPL-CCNC: 24 U/L (ref 10–40)
BACTERIA #/AREA URNS HPF: ABNORMAL /HPF
BILIRUB SERPL-MCNC: 0.3 MG/DL (ref 0.1–1)
BILIRUB UR QL STRIP: NEGATIVE
BUN SERPL-MCNC: 13 MG/DL (ref 8–23)
CALCIUM SERPL-MCNC: 9.3 MG/DL (ref 8.7–10.5)
CANCER AG125 SERPL-ACNC: 1746 U/ML (ref 0–30)
CHLORIDE SERPL-SCNC: 100 MMOL/L (ref 95–110)
CLARITY UR: CLEAR
CO2 SERPL-SCNC: 27 MMOL/L (ref 23–29)
COLOR UR: YELLOW
CREAT SERPL-MCNC: 0.8 MG/DL (ref 0.5–1.4)
ERYTHROCYTE [DISTWIDTH] IN BLOOD BY AUTOMATED COUNT: 13 % (ref 11.5–14.5)
EST. GFR  (NO RACE VARIABLE): >60 ML/MIN/1.73 M^2
GLUCOSE SERPL-MCNC: 112 MG/DL (ref 70–110)
GLUCOSE UR QL STRIP: NEGATIVE
HCT VFR BLD AUTO: 31.9 % (ref 37–48.5)
HGB BLD-MCNC: 10.7 G/DL (ref 12–16)
HGB UR QL STRIP: ABNORMAL
IMM GRANULOCYTES # BLD AUTO: 0 K/UL (ref 0–0.04)
KETONES UR QL STRIP: NEGATIVE
LEUKOCYTE ESTERASE UR QL STRIP: ABNORMAL
MCH RBC QN AUTO: 32.1 PG (ref 27–31)
MCHC RBC AUTO-ENTMCNC: 33.5 G/DL (ref 32–36)
MCV RBC AUTO: 96 FL (ref 82–98)
MICROSCOPIC COMMENT: ABNORMAL
NEUTROPHILS # BLD AUTO: 1.3 K/UL (ref 1.8–7.7)
NITRITE UR QL STRIP: NEGATIVE
PH UR STRIP: 6 [PH] (ref 5–8)
PLATELET # BLD AUTO: 192 K/UL (ref 150–450)
PMV BLD AUTO: 8.1 FL (ref 9.2–12.9)
POTASSIUM SERPL-SCNC: 3.9 MMOL/L (ref 3.5–5.1)
PROT SERPL-MCNC: 7 G/DL (ref 6–8.4)
PROT UR QL STRIP: ABNORMAL
RBC # BLD AUTO: 3.33 M/UL (ref 4–5.4)
RBC #/AREA URNS HPF: 3 /HPF (ref 0–4)
SODIUM SERPL-SCNC: 135 MMOL/L (ref 136–145)
SP GR UR STRIP: 1.02 (ref 1–1.03)
SQUAMOUS #/AREA URNS HPF: 2 /HPF
URN SPEC COLLECT METH UR: ABNORMAL
UROBILINOGEN UR STRIP-ACNC: NEGATIVE EU/DL
WBC # BLD AUTO: 1.84 K/UL (ref 3.9–12.7)
WBC #/AREA URNS HPF: 11 /HPF (ref 0–5)

## 2023-09-27 PROCEDURE — 86304 IMMUNOASSAY TUMOR CA 125: CPT | Performed by: OBSTETRICS & GYNECOLOGY

## 2023-09-27 PROCEDURE — 80053 COMPREHEN METABOLIC PANEL: CPT | Performed by: OBSTETRICS & GYNECOLOGY

## 2023-09-27 PROCEDURE — 81000 URINALYSIS NONAUTO W/SCOPE: CPT | Performed by: OBSTETRICS & GYNECOLOGY

## 2023-09-27 PROCEDURE — 82652 VIT D 1 25-DIHYDROXY: CPT | Performed by: OBSTETRICS & GYNECOLOGY

## 2023-09-27 PROCEDURE — 85027 COMPLETE CBC AUTOMATED: CPT | Performed by: OBSTETRICS & GYNECOLOGY

## 2023-10-02 LAB — 1,25(OH)2D3 SERPL-MCNC: 122 PG/ML (ref 20–79)

## 2023-10-11 ENCOUNTER — LAB VISIT (OUTPATIENT)
Dept: LAB | Facility: OTHER | Age: 68
End: 2023-10-11
Attending: OBSTETRICS & GYNECOLOGY
Payer: MEDICARE

## 2023-10-11 DIAGNOSIS — C56.3 MALIGNANT NEOPLASM OF BOTH OVARIES: ICD-10-CM

## 2023-10-11 DIAGNOSIS — Z51.11 ENCOUNTER FOR ANTINEOPLASTIC CHEMOTHERAPY: ICD-10-CM

## 2023-10-11 LAB
ALBUMIN SERPL BCP-MCNC: 4 G/DL (ref 3.5–5.2)
ALP SERPL-CCNC: 102 U/L (ref 55–135)
ALT SERPL W/O P-5'-P-CCNC: 15 U/L (ref 10–44)
ANION GAP SERPL CALC-SCNC: 10 MMOL/L (ref 8–16)
AST SERPL-CCNC: 21 U/L (ref 10–40)
BACTERIA #/AREA URNS HPF: ABNORMAL /HPF
BILIRUB SERPL-MCNC: 0.3 MG/DL (ref 0.1–1)
BILIRUB UR QL STRIP: NEGATIVE
BUN SERPL-MCNC: 16 MG/DL (ref 8–23)
CALCIUM SERPL-MCNC: 9.4 MG/DL (ref 8.7–10.5)
CANCER AG125 SERPL-ACNC: 1298 U/ML (ref 0–30)
CHLORIDE SERPL-SCNC: 103 MMOL/L (ref 95–110)
CLARITY UR: ABNORMAL
CO2 SERPL-SCNC: 27 MMOL/L (ref 23–29)
COLOR UR: YELLOW
CREAT SERPL-MCNC: 0.9 MG/DL (ref 0.5–1.4)
ERYTHROCYTE [DISTWIDTH] IN BLOOD BY AUTOMATED COUNT: 13.7 % (ref 11.5–14.5)
EST. GFR  (NO RACE VARIABLE): >60 ML/MIN/1.73 M^2
GLUCOSE SERPL-MCNC: 73 MG/DL (ref 70–110)
GLUCOSE UR QL STRIP: NEGATIVE
HCT VFR BLD AUTO: 33.7 % (ref 37–48.5)
HGB BLD-MCNC: 10.9 G/DL (ref 12–16)
HGB UR QL STRIP: ABNORMAL
HYALINE CASTS #/AREA URNS LPF: 0 /LPF
IMM GRANULOCYTES # BLD AUTO: 0.01 K/UL (ref 0–0.04)
KETONES UR QL STRIP: NEGATIVE
LEUKOCYTE ESTERASE UR QL STRIP: ABNORMAL
MCH RBC QN AUTO: 32.3 PG (ref 27–31)
MCHC RBC AUTO-ENTMCNC: 32.3 G/DL (ref 32–36)
MCV RBC AUTO: 100 FL (ref 82–98)
MICROSCOPIC COMMENT: ABNORMAL
NEUTROPHILS # BLD AUTO: 1.2 K/UL (ref 1.8–7.7)
NITRITE UR QL STRIP: NEGATIVE
NON-SQ EPI CELLS #/AREA URNS HPF: 1 /HPF
PH UR STRIP: 6 [PH] (ref 5–8)
PLATELET # BLD AUTO: 245 K/UL (ref 150–450)
PMV BLD AUTO: 8.2 FL (ref 9.2–12.9)
POTASSIUM SERPL-SCNC: 3.6 MMOL/L (ref 3.5–5.1)
PROT SERPL-MCNC: 7.4 G/DL (ref 6–8.4)
PROT UR QL STRIP: ABNORMAL
RBC # BLD AUTO: 3.37 M/UL (ref 4–5.4)
RBC #/AREA URNS HPF: 3 /HPF (ref 0–4)
SODIUM SERPL-SCNC: 140 MMOL/L (ref 136–145)
SP GR UR STRIP: 1.02 (ref 1–1.03)
SQUAMOUS #/AREA URNS HPF: 10 /HPF
URN SPEC COLLECT METH UR: ABNORMAL
UROBILINOGEN UR STRIP-ACNC: NEGATIVE EU/DL
WBC # BLD AUTO: 2.25 K/UL (ref 3.9–12.7)
WBC #/AREA URNS HPF: 46 /HPF (ref 0–5)

## 2023-10-11 PROCEDURE — 85027 COMPLETE CBC AUTOMATED: CPT | Performed by: OBSTETRICS & GYNECOLOGY

## 2023-10-11 PROCEDURE — 80053 COMPREHEN METABOLIC PANEL: CPT | Performed by: OBSTETRICS & GYNECOLOGY

## 2023-10-11 PROCEDURE — 36415 COLL VENOUS BLD VENIPUNCTURE: CPT | Performed by: OBSTETRICS & GYNECOLOGY

## 2023-10-11 PROCEDURE — 86304 IMMUNOASSAY TUMOR CA 125: CPT | Performed by: OBSTETRICS & GYNECOLOGY

## 2023-10-11 PROCEDURE — 81000 URINALYSIS NONAUTO W/SCOPE: CPT | Performed by: OBSTETRICS & GYNECOLOGY

## 2023-10-12 ENCOUNTER — PATIENT MESSAGE (OUTPATIENT)
Dept: GYNECOLOGIC ONCOLOGY | Facility: CLINIC | Age: 68
End: 2023-10-12
Payer: MEDICARE

## 2023-10-12 ENCOUNTER — TELEPHONE (OUTPATIENT)
Dept: GYNECOLOGIC ONCOLOGY | Facility: CLINIC | Age: 68
End: 2023-10-12
Payer: MEDICARE

## 2023-10-12 DIAGNOSIS — N30.00 ACUTE CYSTITIS WITHOUT HEMATURIA: Primary | ICD-10-CM

## 2023-10-12 RX ORDER — NITROFURANTOIN 25; 75 MG/1; MG/1
100 CAPSULE ORAL 2 TIMES DAILY
Qty: 14 CAPSULE | Refills: 0 | Status: SHIPPED | OUTPATIENT
Start: 2023-10-12 | End: 2023-10-19

## 2023-10-12 NOTE — TELEPHONE ENCOUNTER
----- Message from Tera Castillo sent at 10/12/2023  3:38 PM CDT -----  Name of Who is Calling:YESSENIA MCDONALD [[id number]]                   What is the request in detail: Pt wants a call back from the nurse                    Can the clinic reply by [facility]: No                   What Number to Call Back if not in [facility]:[phone]

## 2023-10-13 NOTE — TELEPHONE ENCOUNTER
Long discussion with patient about her low ANC and UA results without UTI symptoms.  Advised Dr. Hodgson will follow up with her next week after her labs on the 18th to determine whether she can proceed with treatment on the 20th.  I, again, recommended taking Macrobid for her UA and reviewed instructions for taking. Advised her urine has additional WBCs, leuks and bacteria and appears more like an infection than two weeks ago and with her low ANC she is at more of a risk for infection. She thinks she wants to speak to the urogyn office. I advised she could call our office and be transferred.  I also extensively discussed Palliative Medicine and what their dept offers and she will think about it.

## 2023-10-18 ENCOUNTER — LAB VISIT (OUTPATIENT)
Dept: LAB | Facility: OTHER | Age: 68
End: 2023-10-18
Attending: OBSTETRICS & GYNECOLOGY
Payer: MEDICARE

## 2023-10-18 DIAGNOSIS — Z51.11 ENCOUNTER FOR ANTINEOPLASTIC CHEMOTHERAPY: ICD-10-CM

## 2023-10-18 DIAGNOSIS — C56.3 MALIGNANT NEOPLASM OF BOTH OVARIES: ICD-10-CM

## 2023-10-18 LAB
ALBUMIN SERPL BCP-MCNC: 3.8 G/DL (ref 3.5–5.2)
ALP SERPL-CCNC: 89 U/L (ref 55–135)
ALT SERPL W/O P-5'-P-CCNC: 13 U/L (ref 10–44)
ANION GAP SERPL CALC-SCNC: 8 MMOL/L (ref 8–16)
AST SERPL-CCNC: 19 U/L (ref 10–40)
BILIRUB SERPL-MCNC: 0.3 MG/DL (ref 0.1–1)
BILIRUB UR QL STRIP: NEGATIVE
BUN SERPL-MCNC: 15 MG/DL (ref 8–23)
CALCIUM SERPL-MCNC: 9.4 MG/DL (ref 8.7–10.5)
CANCER AG125 SERPL-ACNC: 1201 U/ML (ref 0–30)
CHLORIDE SERPL-SCNC: 105 MMOL/L (ref 95–110)
CLARITY UR: CLEAR
CO2 SERPL-SCNC: 28 MMOL/L (ref 23–29)
COLOR UR: YELLOW
CREAT SERPL-MCNC: 0.9 MG/DL (ref 0.5–1.4)
ERYTHROCYTE [DISTWIDTH] IN BLOOD BY AUTOMATED COUNT: 13.7 % (ref 11.5–14.5)
EST. GFR  (NO RACE VARIABLE): >60 ML/MIN/1.73 M^2
GLUCOSE SERPL-MCNC: 79 MG/DL (ref 70–110)
GLUCOSE UR QL STRIP: NEGATIVE
HCT VFR BLD AUTO: 32.2 % (ref 37–48.5)
HGB BLD-MCNC: 10.3 G/DL (ref 12–16)
HGB UR QL STRIP: NEGATIVE
IMM GRANULOCYTES # BLD AUTO: 0.01 K/UL (ref 0–0.04)
KETONES UR QL STRIP: NEGATIVE
LEUKOCYTE ESTERASE UR QL STRIP: NEGATIVE
MCH RBC QN AUTO: 31.9 PG (ref 27–31)
MCHC RBC AUTO-ENTMCNC: 32 G/DL (ref 32–36)
MCV RBC AUTO: 100 FL (ref 82–98)
NEUTROPHILS # BLD AUTO: 1.9 K/UL (ref 1.8–7.7)
NITRITE UR QL STRIP: NEGATIVE
PH UR STRIP: 6 [PH] (ref 5–8)
PLATELET # BLD AUTO: 216 K/UL (ref 150–450)
PMV BLD AUTO: 8.4 FL (ref 9.2–12.9)
POTASSIUM SERPL-SCNC: 4.5 MMOL/L (ref 3.5–5.1)
PROT SERPL-MCNC: 6.8 G/DL (ref 6–8.4)
PROT UR QL STRIP: ABNORMAL
RBC # BLD AUTO: 3.23 M/UL (ref 4–5.4)
SODIUM SERPL-SCNC: 141 MMOL/L (ref 136–145)
SP GR UR STRIP: 1.02 (ref 1–1.03)
URN SPEC COLLECT METH UR: ABNORMAL
UROBILINOGEN UR STRIP-ACNC: NEGATIVE EU/DL
WBC # BLD AUTO: 3.21 K/UL (ref 3.9–12.7)

## 2023-10-18 PROCEDURE — 36415 COLL VENOUS BLD VENIPUNCTURE: CPT | Performed by: OBSTETRICS & GYNECOLOGY

## 2023-10-18 PROCEDURE — 86304 IMMUNOASSAY TUMOR CA 125: CPT | Performed by: OBSTETRICS & GYNECOLOGY

## 2023-10-18 PROCEDURE — 81003 URINALYSIS AUTO W/O SCOPE: CPT | Performed by: OBSTETRICS & GYNECOLOGY

## 2023-10-18 PROCEDURE — 85027 COMPLETE CBC AUTOMATED: CPT | Performed by: OBSTETRICS & GYNECOLOGY

## 2023-10-18 PROCEDURE — 80053 COMPREHEN METABOLIC PANEL: CPT | Performed by: OBSTETRICS & GYNECOLOGY

## 2023-10-19 ENCOUNTER — PATIENT MESSAGE (OUTPATIENT)
Dept: GYNECOLOGIC ONCOLOGY | Facility: CLINIC | Age: 68
End: 2023-10-19
Payer: MEDICARE

## 2023-10-19 RX ORDER — EPINEPHRINE 0.3 MG/.3ML
0.3 INJECTION SUBCUTANEOUS ONCE AS NEEDED
Status: CANCELLED | OUTPATIENT
Start: 2023-10-19

## 2023-10-19 RX ORDER — HEPARIN 100 UNIT/ML
500 SYRINGE INTRAVENOUS
Status: CANCELLED | OUTPATIENT
Start: 2023-10-19

## 2023-10-19 RX ORDER — SODIUM CHLORIDE 0.9 % (FLUSH) 0.9 %
10 SYRINGE (ML) INJECTION
Status: CANCELLED | OUTPATIENT
Start: 2023-10-19

## 2023-10-19 RX ORDER — DIPHENHYDRAMINE HYDROCHLORIDE 50 MG/ML
50 INJECTION INTRAMUSCULAR; INTRAVENOUS ONCE AS NEEDED
Status: CANCELLED | OUTPATIENT
Start: 2023-10-19

## 2023-10-20 ENCOUNTER — INFUSION (OUTPATIENT)
Dept: INFUSION THERAPY | Facility: HOSPITAL | Age: 68
End: 2023-10-20
Payer: MEDICARE

## 2023-10-20 VITALS
OXYGEN SATURATION: 97 % | SYSTOLIC BLOOD PRESSURE: 161 MMHG | RESPIRATION RATE: 18 BRPM | HEART RATE: 76 BPM | WEIGHT: 116.88 LBS | TEMPERATURE: 98 F | BODY MASS INDEX: 18.79 KG/M2 | DIASTOLIC BLOOD PRESSURE: 71 MMHG | HEIGHT: 66 IN

## 2023-10-20 DIAGNOSIS — C56.3 MALIGNANT NEOPLASM OF BOTH OVARIES: Primary | ICD-10-CM

## 2023-10-20 PROCEDURE — 96367 TX/PROPH/DG ADDL SEQ IV INF: CPT

## 2023-10-20 PROCEDURE — 63600175 PHARM REV CODE 636 W HCPCS: Mod: JZ,JG | Performed by: OBSTETRICS & GYNECOLOGY

## 2023-10-20 PROCEDURE — 96413 CHEMO IV INFUSION 1 HR: CPT

## 2023-10-20 PROCEDURE — A4216 STERILE WATER/SALINE, 10 ML: HCPCS | Performed by: OBSTETRICS & GYNECOLOGY

## 2023-10-20 PROCEDURE — 96417 CHEMO IV INFUS EACH ADDL SEQ: CPT

## 2023-10-20 PROCEDURE — 25000003 PHARM REV CODE 250: Performed by: OBSTETRICS & GYNECOLOGY

## 2023-10-20 PROCEDURE — 96375 TX/PRO/DX INJ NEW DRUG ADDON: CPT

## 2023-10-20 RX ORDER — HEPARIN 100 UNIT/ML
500 SYRINGE INTRAVENOUS
Status: DISCONTINUED | OUTPATIENT
Start: 2023-10-20 | End: 2023-10-20 | Stop reason: HOSPADM

## 2023-10-20 RX ORDER — EPINEPHRINE 0.3 MG/.3ML
0.3 INJECTION SUBCUTANEOUS ONCE AS NEEDED
Status: DISCONTINUED | OUTPATIENT
Start: 2023-10-20 | End: 2023-10-20 | Stop reason: HOSPADM

## 2023-10-20 RX ORDER — DIPHENHYDRAMINE HYDROCHLORIDE 50 MG/ML
50 INJECTION INTRAMUSCULAR; INTRAVENOUS ONCE AS NEEDED
Status: DISCONTINUED | OUTPATIENT
Start: 2023-10-20 | End: 2023-10-20 | Stop reason: HOSPADM

## 2023-10-20 RX ORDER — SODIUM CHLORIDE 0.9 % (FLUSH) 0.9 %
10 SYRINGE (ML) INJECTION
Status: DISCONTINUED | OUTPATIENT
Start: 2023-10-20 | End: 2023-10-20 | Stop reason: HOSPADM

## 2023-10-20 RX ADMIN — BEVACIZUMAB-AWWB 790 MG: 400 INJECTION, SOLUTION INTRAVENOUS at 11:10

## 2023-10-20 RX ADMIN — PALONOSETRON HYDROCHLORIDE 0.25 MG: 0.25 INJECTION INTRAVENOUS at 12:10

## 2023-10-20 RX ADMIN — SODIUM CHLORIDE: 9 INJECTION, SOLUTION INTRAVENOUS at 10:10

## 2023-10-20 RX ADMIN — APREPITANT 130 MG: 130 INJECTION, EMULSION INTRAVENOUS at 12:10

## 2023-10-20 RX ADMIN — Medication 10 ML: at 02:10

## 2023-10-20 RX ADMIN — CARBOPLATIN 295 MG: 10 INJECTION, SOLUTION INTRAVENOUS at 01:10

## 2023-10-20 RX ADMIN — HEPARIN 500 UNITS: 100 SYRINGE at 02:10

## 2023-10-20 RX ADMIN — GEMCITABINE HYDROCHLORIDE 1060 MG: 1 INJECTION, SOLUTION INTRAVENOUS at 01:10

## 2023-10-20 NOTE — NURSING
1000 Pt here for MVASI, Gemzar, Carbo infusion, accompanied by spouse, no new complaints or concerns at present; discussed treatment plan for today, all questions answered and pt agrees to proceed

## 2023-10-20 NOTE — PLAN OF CARE
1441  Infusion completed, pt tolerated; pt instructed to increase water hydration daily; discussed when to contact MD, when to report to ED; pt and spouse verbalized understanding of all discussed and when to report next

## 2023-10-21 ENCOUNTER — PATIENT MESSAGE (OUTPATIENT)
Dept: HEMATOLOGY/ONCOLOGY | Facility: CLINIC | Age: 68
End: 2023-10-21
Payer: MEDICARE

## 2023-10-25 ENCOUNTER — LAB VISIT (OUTPATIENT)
Dept: LAB | Facility: OTHER | Age: 68
End: 2023-10-25
Attending: OBSTETRICS & GYNECOLOGY
Payer: MEDICARE

## 2023-10-25 DIAGNOSIS — C56.3 MALIGNANT NEOPLASM OF BOTH OVARIES: ICD-10-CM

## 2023-10-25 DIAGNOSIS — Z51.11 ENCOUNTER FOR ANTINEOPLASTIC CHEMOTHERAPY: ICD-10-CM

## 2023-10-25 LAB
ALBUMIN SERPL BCP-MCNC: 3.9 G/DL (ref 3.5–5.2)
ALP SERPL-CCNC: 96 U/L (ref 55–135)
ALT SERPL W/O P-5'-P-CCNC: 25 U/L (ref 10–44)
ANION GAP SERPL CALC-SCNC: 8 MMOL/L (ref 8–16)
AST SERPL-CCNC: 27 U/L (ref 10–40)
BILIRUB SERPL-MCNC: 0.4 MG/DL (ref 0.1–1)
BILIRUB UR QL STRIP: NEGATIVE
BUN SERPL-MCNC: 14 MG/DL (ref 8–23)
CALCIUM SERPL-MCNC: 9.4 MG/DL (ref 8.7–10.5)
CANCER AG125 SERPL-ACNC: 1206 U/ML (ref 0–30)
CHLORIDE SERPL-SCNC: 103 MMOL/L (ref 95–110)
CLARITY UR: CLEAR
CO2 SERPL-SCNC: 26 MMOL/L (ref 23–29)
COLOR UR: COLORLESS
CREAT SERPL-MCNC: 0.8 MG/DL (ref 0.5–1.4)
ERYTHROCYTE [DISTWIDTH] IN BLOOD BY AUTOMATED COUNT: 13.3 % (ref 11.5–14.5)
EST. GFR  (NO RACE VARIABLE): >60 ML/MIN/1.73 M^2
GLUCOSE SERPL-MCNC: 100 MG/DL (ref 70–110)
GLUCOSE UR QL STRIP: NEGATIVE
HCT VFR BLD AUTO: 31 % (ref 37–48.5)
HGB BLD-MCNC: 10.3 G/DL (ref 12–16)
HGB UR QL STRIP: NEGATIVE
IMM GRANULOCYTES # BLD AUTO: 0.01 K/UL (ref 0–0.04)
KETONES UR QL STRIP: NEGATIVE
LEUKOCYTE ESTERASE UR QL STRIP: NEGATIVE
MCH RBC QN AUTO: 32.1 PG (ref 27–31)
MCHC RBC AUTO-ENTMCNC: 33.2 G/DL (ref 32–36)
MCV RBC AUTO: 97 FL (ref 82–98)
NEUTROPHILS # BLD AUTO: 1.8 K/UL (ref 1.8–7.7)
NITRITE UR QL STRIP: NEGATIVE
PH UR STRIP: 6 [PH] (ref 5–8)
PLATELET # BLD AUTO: 156 K/UL (ref 150–450)
PMV BLD AUTO: 8.2 FL (ref 9.2–12.9)
POTASSIUM SERPL-SCNC: 4.2 MMOL/L (ref 3.5–5.1)
PROT SERPL-MCNC: 7 G/DL (ref 6–8.4)
PROT UR QL STRIP: NEGATIVE
RBC # BLD AUTO: 3.21 M/UL (ref 4–5.4)
SODIUM SERPL-SCNC: 137 MMOL/L (ref 136–145)
SP GR UR STRIP: 1.01 (ref 1–1.03)
URN SPEC COLLECT METH UR: ABNORMAL
UROBILINOGEN UR STRIP-ACNC: NEGATIVE EU/DL
WBC # BLD AUTO: 2.53 K/UL (ref 3.9–12.7)

## 2023-10-25 PROCEDURE — 81003 URINALYSIS AUTO W/O SCOPE: CPT | Performed by: OBSTETRICS & GYNECOLOGY

## 2023-10-25 PROCEDURE — 80053 COMPREHEN METABOLIC PANEL: CPT | Performed by: OBSTETRICS & GYNECOLOGY

## 2023-10-25 PROCEDURE — 86304 IMMUNOASSAY TUMOR CA 125: CPT | Performed by: OBSTETRICS & GYNECOLOGY

## 2023-10-25 PROCEDURE — 85027 COMPLETE CBC AUTOMATED: CPT | Performed by: OBSTETRICS & GYNECOLOGY

## 2023-10-26 ENCOUNTER — PATIENT MESSAGE (OUTPATIENT)
Dept: GYNECOLOGIC ONCOLOGY | Facility: CLINIC | Age: 68
End: 2023-10-26
Payer: MEDICARE

## 2023-10-27 ENCOUNTER — INFUSION (OUTPATIENT)
Dept: INFUSION THERAPY | Facility: HOSPITAL | Age: 68
End: 2023-10-27
Payer: MEDICARE

## 2023-10-27 VITALS
WEIGHT: 116 LBS | HEIGHT: 66 IN | HEART RATE: 69 BPM | DIASTOLIC BLOOD PRESSURE: 76 MMHG | BODY MASS INDEX: 18.64 KG/M2 | TEMPERATURE: 98 F | SYSTOLIC BLOOD PRESSURE: 169 MMHG | RESPIRATION RATE: 18 BRPM

## 2023-10-27 DIAGNOSIS — C56.3 MALIGNANT NEOPLASM OF BOTH OVARIES: Primary | ICD-10-CM

## 2023-10-27 PROCEDURE — 96375 TX/PRO/DX INJ NEW DRUG ADDON: CPT

## 2023-10-27 PROCEDURE — 96413 CHEMO IV INFUSION 1 HR: CPT

## 2023-10-27 PROCEDURE — 63600175 PHARM REV CODE 636 W HCPCS: Performed by: OBSTETRICS & GYNECOLOGY

## 2023-10-27 PROCEDURE — 25000003 PHARM REV CODE 250: Performed by: OBSTETRICS & GYNECOLOGY

## 2023-10-27 RX ORDER — DIPHENHYDRAMINE HYDROCHLORIDE 50 MG/ML
50 INJECTION INTRAMUSCULAR; INTRAVENOUS ONCE AS NEEDED
Status: CANCELLED | OUTPATIENT
Start: 2023-10-27

## 2023-10-27 RX ORDER — HEPARIN 100 UNIT/ML
500 SYRINGE INTRAVENOUS
Status: CANCELLED | OUTPATIENT
Start: 2023-10-27

## 2023-10-27 RX ORDER — DIPHENHYDRAMINE HYDROCHLORIDE 50 MG/ML
50 INJECTION INTRAMUSCULAR; INTRAVENOUS ONCE AS NEEDED
Status: DISCONTINUED | OUTPATIENT
Start: 2023-10-27 | End: 2023-10-27 | Stop reason: HOSPADM

## 2023-10-27 RX ORDER — SODIUM CHLORIDE 0.9 % (FLUSH) 0.9 %
10 SYRINGE (ML) INJECTION
Status: CANCELLED | OUTPATIENT
Start: 2023-10-27

## 2023-10-27 RX ORDER — EPINEPHRINE 0.3 MG/.3ML
0.3 INJECTION SUBCUTANEOUS ONCE AS NEEDED
Status: DISCONTINUED | OUTPATIENT
Start: 2023-10-27 | End: 2023-10-27 | Stop reason: HOSPADM

## 2023-10-27 RX ORDER — EPINEPHRINE 0.3 MG/.3ML
0.3 INJECTION SUBCUTANEOUS ONCE AS NEEDED
Status: CANCELLED | OUTPATIENT
Start: 2023-10-27

## 2023-10-27 RX ORDER — ONDANSETRON 2 MG/ML
8 INJECTION INTRAMUSCULAR; INTRAVENOUS
Status: COMPLETED | OUTPATIENT
Start: 2023-10-27 | End: 2023-10-27

## 2023-10-27 RX ORDER — SODIUM CHLORIDE 0.9 % (FLUSH) 0.9 %
10 SYRINGE (ML) INJECTION
Status: DISCONTINUED | OUTPATIENT
Start: 2023-10-27 | End: 2023-10-27 | Stop reason: HOSPADM

## 2023-10-27 RX ORDER — ONDANSETRON 2 MG/ML
8 INJECTION INTRAMUSCULAR; INTRAVENOUS
Status: CANCELLED | OUTPATIENT
Start: 2023-10-27

## 2023-10-27 RX ORDER — HEPARIN 100 UNIT/ML
500 SYRINGE INTRAVENOUS
Status: DISCONTINUED | OUTPATIENT
Start: 2023-10-27 | End: 2023-10-27 | Stop reason: HOSPADM

## 2023-10-27 RX ADMIN — GEMCITABINE HYDROCHLORIDE 1060 MG: 1 INJECTION, SOLUTION INTRAVENOUS at 09:10

## 2023-10-27 RX ADMIN — ONDANSETRON 8 MG: 2 INJECTION INTRAMUSCULAR; INTRAVENOUS at 09:10

## 2023-10-27 RX ADMIN — SODIUM CHLORIDE: 9 INJECTION, SOLUTION INTRAVENOUS at 09:10

## 2023-10-27 RX ADMIN — HEPARIN 500 UNITS: 100 SYRINGE at 10:10

## 2023-10-27 NOTE — PLAN OF CARE
Tolerated Gemzar well. Patient refused neulasta obi. Prefers it to be administered here in clinic the next day. Appointment for tomorrow given.

## 2023-10-28 ENCOUNTER — INFUSION (OUTPATIENT)
Dept: INFUSION THERAPY | Facility: HOSPITAL | Age: 68
End: 2023-10-28
Payer: MEDICARE

## 2023-10-28 VITALS — WEIGHT: 115.94 LBS | BODY MASS INDEX: 18.63 KG/M2 | HEIGHT: 66 IN

## 2023-10-28 DIAGNOSIS — C56.3 MALIGNANT NEOPLASM OF BOTH OVARIES: Primary | ICD-10-CM

## 2023-10-28 PROCEDURE — 63600175 PHARM REV CODE 636 W HCPCS: Mod: JZ,JG | Performed by: OBSTETRICS & GYNECOLOGY

## 2023-10-28 PROCEDURE — 96372 THER/PROPH/DIAG INJ SC/IM: CPT

## 2023-10-28 RX ADMIN — PEGFILGRASTIM 6 MG: 6 INJECTION SUBCUTANEOUS at 09:10

## 2023-11-07 ENCOUNTER — PATIENT MESSAGE (OUTPATIENT)
Dept: GYNECOLOGIC ONCOLOGY | Facility: CLINIC | Age: 68
End: 2023-11-07
Payer: MEDICARE

## 2023-11-08 ENCOUNTER — LAB VISIT (OUTPATIENT)
Dept: LAB | Facility: OTHER | Age: 68
End: 2023-11-08
Attending: OBSTETRICS & GYNECOLOGY
Payer: MEDICARE

## 2023-11-08 DIAGNOSIS — Z51.11 ENCOUNTER FOR ANTINEOPLASTIC CHEMOTHERAPY: ICD-10-CM

## 2023-11-08 DIAGNOSIS — C56.3 MALIGNANT NEOPLASM OF BOTH OVARIES: ICD-10-CM

## 2023-11-08 LAB
ALBUMIN SERPL BCP-MCNC: 4 G/DL (ref 3.5–5.2)
ALP SERPL-CCNC: 161 U/L (ref 55–135)
ALT SERPL W/O P-5'-P-CCNC: 19 U/L (ref 10–44)
ANION GAP SERPL CALC-SCNC: 10 MMOL/L (ref 8–16)
AST SERPL-CCNC: 21 U/L (ref 10–40)
BACTERIA #/AREA URNS HPF: NORMAL /HPF
BILIRUB SERPL-MCNC: 0.3 MG/DL (ref 0.1–1)
BILIRUB UR QL STRIP: NEGATIVE
BUN SERPL-MCNC: 15 MG/DL (ref 8–23)
CALCIUM SERPL-MCNC: 9.7 MG/DL (ref 8.7–10.5)
CANCER AG125 SERPL-ACNC: 990 U/ML (ref 0–30)
CHLORIDE SERPL-SCNC: 104 MMOL/L (ref 95–110)
CLARITY UR: CLEAR
CO2 SERPL-SCNC: 28 MMOL/L (ref 23–29)
COLOR UR: YELLOW
CREAT SERPL-MCNC: 0.8 MG/DL (ref 0.5–1.4)
ERYTHROCYTE [DISTWIDTH] IN BLOOD BY AUTOMATED COUNT: 15.5 % (ref 11.5–14.5)
EST. GFR  (NO RACE VARIABLE): >60 ML/MIN/1.73 M^2
GLUCOSE SERPL-MCNC: 95 MG/DL (ref 70–110)
GLUCOSE UR QL STRIP: NEGATIVE
HCT VFR BLD AUTO: 30.2 % (ref 37–48.5)
HGB BLD-MCNC: 9.9 G/DL (ref 12–16)
HGB UR QL STRIP: NEGATIVE
HYALINE CASTS #/AREA URNS LPF: 0 /LPF
IMM GRANULOCYTES # BLD AUTO: 0.13 K/UL (ref 0–0.04)
KETONES UR QL STRIP: NEGATIVE
LEUKOCYTE ESTERASE UR QL STRIP: NEGATIVE
MCH RBC QN AUTO: 32.5 PG (ref 27–31)
MCHC RBC AUTO-ENTMCNC: 32.8 G/DL (ref 32–36)
MCV RBC AUTO: 99 FL (ref 82–98)
MICROSCOPIC COMMENT: NORMAL
NEUTROPHILS # BLD AUTO: 12 K/UL (ref 1.8–7.7)
NITRITE UR QL STRIP: NEGATIVE
PH UR STRIP: 6 [PH] (ref 5–8)
PLATELET # BLD AUTO: 161 K/UL (ref 150–450)
PMV BLD AUTO: 9.6 FL (ref 9.2–12.9)
POTASSIUM SERPL-SCNC: 4.4 MMOL/L (ref 3.5–5.1)
PROT SERPL-MCNC: 7.5 G/DL (ref 6–8.4)
PROT UR QL STRIP: ABNORMAL
RBC # BLD AUTO: 3.05 M/UL (ref 4–5.4)
RBC #/AREA URNS HPF: 3 /HPF (ref 0–4)
SODIUM SERPL-SCNC: 142 MMOL/L (ref 136–145)
SP GR UR STRIP: 1.03 (ref 1–1.03)
SQUAMOUS #/AREA URNS HPF: 0 /HPF
URN SPEC COLLECT METH UR: ABNORMAL
UROBILINOGEN UR STRIP-ACNC: NEGATIVE EU/DL
WBC # BLD AUTO: 14.02 K/UL (ref 3.9–12.7)
WBC #/AREA URNS HPF: 2 /HPF (ref 0–5)

## 2023-11-08 PROCEDURE — 85027 COMPLETE CBC AUTOMATED: CPT | Performed by: OBSTETRICS & GYNECOLOGY

## 2023-11-08 PROCEDURE — 81000 URINALYSIS NONAUTO W/SCOPE: CPT | Performed by: OBSTETRICS & GYNECOLOGY

## 2023-11-08 PROCEDURE — 80053 COMPREHEN METABOLIC PANEL: CPT | Performed by: OBSTETRICS & GYNECOLOGY

## 2023-11-08 PROCEDURE — 36415 COLL VENOUS BLD VENIPUNCTURE: CPT | Performed by: OBSTETRICS & GYNECOLOGY

## 2023-11-08 PROCEDURE — 86304 IMMUNOASSAY TUMOR CA 125: CPT | Performed by: OBSTETRICS & GYNECOLOGY

## 2023-11-08 NOTE — TELEPHONE ENCOUNTER
Called to speak with Ms. Hung and review lab results. CMP and UA are fine. CBC still anemic but relatively stable and reflects Neulasta.   Discussed nutrition, daily Claritin. Offered integrative oncology to be proactive with symptoms and help improve energy but she declined.  Discussed delaying cycle 3 by a week, repeating labs next week and proceeding accordingly. I will review with one of the gyn onc providers to confirm this will not affect the efficacy of her treatment and message her.

## 2023-11-16 ENCOUNTER — HOSPITAL ENCOUNTER (INPATIENT)
Facility: OTHER | Age: 68
LOS: 3 days | Discharge: HOME | DRG: 755 | End: 2023-11-19
Attending: EMERGENCY MEDICINE | Admitting: OBSTETRICS & GYNECOLOGY
Payer: MEDICARE

## 2023-11-16 DIAGNOSIS — K56.609 SBO (SMALL BOWEL OBSTRUCTION): ICD-10-CM

## 2023-11-16 DIAGNOSIS — K56.600 PARTIAL SMALL BOWEL OBSTRUCTION: Primary | ICD-10-CM

## 2023-11-16 PROBLEM — Z51.5 ENCOUNTER FOR PALLIATIVE CARE: Status: ACTIVE | Noted: 2023-11-16

## 2023-11-16 LAB
ALBUMIN SERPL BCP-MCNC: 4.6 G/DL (ref 3.5–5.2)
ALP SERPL-CCNC: 148 U/L (ref 55–135)
ALT SERPL W/O P-5'-P-CCNC: 19 U/L (ref 10–44)
ANION GAP SERPL CALC-SCNC: 11 MMOL/L (ref 8–16)
ANION GAP SERPL CALC-SCNC: 17 MMOL/L (ref 8–16)
ANISOCYTOSIS BLD QL SMEAR: SLIGHT
AST SERPL-CCNC: 24 U/L (ref 10–40)
BACTERIA #/AREA URNS HPF: ABNORMAL /HPF
BASOPHILS # BLD AUTO: ABNORMAL K/UL (ref 0–0.2)
BASOPHILS NFR BLD: 0 % (ref 0–1.9)
BILIRUB SERPL-MCNC: 0.5 MG/DL (ref 0.1–1)
BILIRUB UR QL STRIP: NEGATIVE
BUN SERPL-MCNC: 20 MG/DL (ref 8–23)
BUN SERPL-MCNC: 31 MG/DL (ref 8–23)
CALCIUM SERPL-MCNC: 10 MG/DL (ref 8.7–10.5)
CALCIUM SERPL-MCNC: 11.2 MG/DL (ref 8.7–10.5)
CHLORIDE SERPL-SCNC: 100 MMOL/L (ref 95–110)
CHLORIDE SERPL-SCNC: 103 MMOL/L (ref 95–110)
CLARITY UR: ABNORMAL
CO2 SERPL-SCNC: 23 MMOL/L (ref 23–29)
CO2 SERPL-SCNC: 26 MMOL/L (ref 23–29)
COLOR UR: YELLOW
CREAT SERPL-MCNC: 1.1 MG/DL (ref 0.5–1.4)
CREAT SERPL-MCNC: 1.2 MG/DL (ref 0.5–1.4)
DIFFERENTIAL METHOD: ABNORMAL
EOSINOPHIL # BLD AUTO: ABNORMAL K/UL (ref 0–0.5)
EOSINOPHIL NFR BLD: 0 % (ref 0–8)
ERYTHROCYTE [DISTWIDTH] IN BLOOD BY AUTOMATED COUNT: 16.3 % (ref 11.5–14.5)
EST. GFR  (NO RACE VARIABLE): 49 ML/MIN/1.73 M^2
EST. GFR  (NO RACE VARIABLE): 54 ML/MIN/1.73 M^2
GLUCOSE SERPL-MCNC: 111 MG/DL (ref 70–110)
GLUCOSE SERPL-MCNC: 207 MG/DL (ref 70–110)
GLUCOSE UR QL STRIP: NEGATIVE
HCT VFR BLD AUTO: 41.6 % (ref 37–48.5)
HGB BLD-MCNC: 13.5 G/DL (ref 12–16)
HGB UR QL STRIP: NEGATIVE
HYALINE CASTS #/AREA URNS LPF: 3 /LPF
IMM GRANULOCYTES # BLD AUTO: ABNORMAL K/UL (ref 0–0.04)
IMM GRANULOCYTES NFR BLD AUTO: ABNORMAL % (ref 0–0.5)
KETONES UR QL STRIP: ABNORMAL
LEUKOCYTE ESTERASE UR QL STRIP: ABNORMAL
LIPASE SERPL-CCNC: 16 U/L (ref 4–60)
LYMPHOCYTES # BLD AUTO: ABNORMAL K/UL (ref 1–4.8)
LYMPHOCYTES NFR BLD: 0 % (ref 18–48)
MAGNESIUM SERPL-MCNC: 1.9 MG/DL (ref 1.6–2.6)
MCH RBC QN AUTO: 32.3 PG (ref 27–31)
MCHC RBC AUTO-ENTMCNC: 32.5 G/DL (ref 32–36)
MCV RBC AUTO: 100 FL (ref 82–98)
METAMYELOCYTES NFR BLD MANUAL: 1 %
MICROSCOPIC COMMENT: ABNORMAL
MONOCYTES # BLD AUTO: ABNORMAL K/UL (ref 0.3–1)
MONOCYTES NFR BLD: 4 % (ref 4–15)
NEUTROPHILS NFR BLD: 88 % (ref 38–73)
NEUTS BAND NFR BLD MANUAL: 7 %
NITRITE UR QL STRIP: NEGATIVE
NRBC BLD-RTO: 0 /100 WBC
PH UR STRIP: 5 [PH] (ref 5–8)
PHOSPHATE SERPL-MCNC: 5.2 MG/DL (ref 2.7–4.5)
PLATELET # BLD AUTO: 469 K/UL (ref 150–450)
PLATELET BLD QL SMEAR: ABNORMAL
PMV BLD AUTO: 8.6 FL (ref 9.2–12.9)
POTASSIUM SERPL-SCNC: 4 MMOL/L (ref 3.5–5.1)
POTASSIUM SERPL-SCNC: 4.9 MMOL/L (ref 3.5–5.1)
PROT SERPL-MCNC: 8.3 G/DL (ref 6–8.4)
PROT UR QL STRIP: ABNORMAL
RBC # BLD AUTO: 4.18 M/UL (ref 4–5.4)
RBC #/AREA URNS HPF: 8 /HPF (ref 0–4)
SODIUM SERPL-SCNC: 140 MMOL/L (ref 136–145)
SODIUM SERPL-SCNC: 140 MMOL/L (ref 136–145)
SP GR UR STRIP: 1.03 (ref 1–1.03)
SQUAMOUS #/AREA URNS HPF: 2 /HPF
STOMATOCYTES BLD QL SMEAR: PRESENT
TOXIC GRANULES BLD QL SMEAR: PRESENT
UNIDENT CRYS URNS QL MICRO: ABNORMAL
URN SPEC COLLECT METH UR: ABNORMAL
UROBILINOGEN UR STRIP-ACNC: NEGATIVE EU/DL
WBC # BLD AUTO: 38.03 K/UL (ref 3.9–12.7)
WBC #/AREA URNS HPF: 8 /HPF (ref 0–5)

## 2023-11-16 PROCEDURE — 83735 ASSAY OF MAGNESIUM: CPT | Performed by: EMERGENCY MEDICINE

## 2023-11-16 PROCEDURE — 63600175 PHARM REV CODE 636 W HCPCS

## 2023-11-16 PROCEDURE — 63600175 PHARM REV CODE 636 W HCPCS: Performed by: STUDENT IN AN ORGANIZED HEALTH CARE EDUCATION/TRAINING PROGRAM

## 2023-11-16 PROCEDURE — 25000003 PHARM REV CODE 250

## 2023-11-16 PROCEDURE — 80048 BASIC METABOLIC PNL TOTAL CA: CPT | Mod: XB

## 2023-11-16 PROCEDURE — 83690 ASSAY OF LIPASE: CPT | Performed by: EMERGENCY MEDICINE

## 2023-11-16 PROCEDURE — 96361 HYDRATE IV INFUSION ADD-ON: CPT

## 2023-11-16 PROCEDURE — 99223 1ST HOSP IP/OBS HIGH 75: CPT | Mod: ,,, | Performed by: OBSTETRICS & GYNECOLOGY

## 2023-11-16 PROCEDURE — 80053 COMPREHEN METABOLIC PANEL: CPT | Performed by: EMERGENCY MEDICINE

## 2023-11-16 PROCEDURE — 96374 THER/PROPH/DIAG INJ IV PUSH: CPT

## 2023-11-16 PROCEDURE — 99223 1ST HOSP IP/OBS HIGH 75: CPT | Mod: ,,, | Performed by: FAMILY MEDICINE

## 2023-11-16 PROCEDURE — 81000 URINALYSIS NONAUTO W/SCOPE: CPT | Performed by: STUDENT IN AN ORGANIZED HEALTH CARE EDUCATION/TRAINING PROGRAM

## 2023-11-16 PROCEDURE — 85007 BL SMEAR W/DIFF WBC COUNT: CPT | Performed by: EMERGENCY MEDICINE

## 2023-11-16 PROCEDURE — 11000001 HC ACUTE MED/SURG PRIVATE ROOM

## 2023-11-16 PROCEDURE — 63600175 PHARM REV CODE 636 W HCPCS: Performed by: EMERGENCY MEDICINE

## 2023-11-16 PROCEDURE — 84100 ASSAY OF PHOSPHORUS: CPT | Performed by: EMERGENCY MEDICINE

## 2023-11-16 PROCEDURE — 99285 EMERGENCY DEPT VISIT HI MDM: CPT | Mod: 25

## 2023-11-16 PROCEDURE — 25000003 PHARM REV CODE 250: Performed by: OBSTETRICS & GYNECOLOGY

## 2023-11-16 PROCEDURE — 99223 PR INITIAL HOSPITAL CARE,LEVL III: ICD-10-PCS | Mod: ,,, | Performed by: OBSTETRICS & GYNECOLOGY

## 2023-11-16 PROCEDURE — 25000003 PHARM REV CODE 250: Performed by: STUDENT IN AN ORGANIZED HEALTH CARE EDUCATION/TRAINING PROGRAM

## 2023-11-16 PROCEDURE — 96375 TX/PRO/DX INJ NEW DRUG ADDON: CPT

## 2023-11-16 PROCEDURE — 85027 COMPLETE CBC AUTOMATED: CPT | Performed by: EMERGENCY MEDICINE

## 2023-11-16 PROCEDURE — 96376 TX/PRO/DX INJ SAME DRUG ADON: CPT

## 2023-11-16 PROCEDURE — 36415 COLL VENOUS BLD VENIPUNCTURE: CPT

## 2023-11-16 PROCEDURE — 99223 PR INITIAL HOSPITAL CARE,LEVL III: ICD-10-PCS | Mod: ,,, | Performed by: FAMILY MEDICINE

## 2023-11-16 RX ORDER — PROCHLORPERAZINE EDISYLATE 5 MG/ML
2.5 INJECTION INTRAMUSCULAR; INTRAVENOUS EVERY 6 HOURS
Status: DISCONTINUED | OUTPATIENT
Start: 2023-11-16 | End: 2023-11-19 | Stop reason: HOSPADM

## 2023-11-16 RX ORDER — MUPIROCIN 20 MG/G
OINTMENT TOPICAL 2 TIMES DAILY
Status: DISCONTINUED | OUTPATIENT
Start: 2023-11-16 | End: 2023-11-19 | Stop reason: HOSPADM

## 2023-11-16 RX ORDER — DEXAMETHASONE 1 MG/1
2 TABLET ORAL EVERY 12 HOURS
Status: DISCONTINUED | OUTPATIENT
Start: 2023-11-16 | End: 2023-11-16

## 2023-11-16 RX ORDER — ONDANSETRON 2 MG/ML
8 INJECTION INTRAMUSCULAR; INTRAVENOUS EVERY 6 HOURS
Status: DISCONTINUED | OUTPATIENT
Start: 2023-11-16 | End: 2023-11-19 | Stop reason: HOSPADM

## 2023-11-16 RX ORDER — ONDANSETRON 2 MG/ML
8 INJECTION INTRAMUSCULAR; INTRAVENOUS ONCE AS NEEDED
Status: COMPLETED | OUTPATIENT
Start: 2023-11-16 | End: 2023-11-16

## 2023-11-16 RX ORDER — TALC
6 POWDER (GRAM) TOPICAL NIGHTLY PRN
Status: DISCONTINUED | OUTPATIENT
Start: 2023-11-16 | End: 2023-11-19 | Stop reason: HOSPADM

## 2023-11-16 RX ORDER — PROMETHAZINE HYDROCHLORIDE 12.5 MG/1
12.5 SUPPOSITORY RECTAL EVERY 6 HOURS PRN
Status: DISCONTINUED | OUTPATIENT
Start: 2023-11-16 | End: 2023-11-19 | Stop reason: HOSPADM

## 2023-11-16 RX ORDER — METOCLOPRAMIDE HYDROCHLORIDE 5 MG/ML
10 INJECTION INTRAMUSCULAR; INTRAVENOUS
Status: COMPLETED | OUTPATIENT
Start: 2023-11-16 | End: 2023-11-16

## 2023-11-16 RX ORDER — PROCHLORPERAZINE MALEATE 5 MG
5 TABLET ORAL EVERY 6 HOURS PRN
COMMUNITY
End: 2023-11-21 | Stop reason: SDUPTHER

## 2023-11-16 RX ORDER — HYDRALAZINE HYDROCHLORIDE 20 MG/ML
10 INJECTION INTRAMUSCULAR; INTRAVENOUS EVERY 6 HOURS PRN
Status: DISCONTINUED | OUTPATIENT
Start: 2023-11-16 | End: 2023-11-19 | Stop reason: HOSPADM

## 2023-11-16 RX ORDER — HYDROMORPHONE HYDROCHLORIDE 1 MG/ML
0.5 INJECTION, SOLUTION INTRAMUSCULAR; INTRAVENOUS; SUBCUTANEOUS EVERY 4 HOURS PRN
Status: DISCONTINUED | OUTPATIENT
Start: 2023-11-16 | End: 2023-11-18

## 2023-11-16 RX ORDER — ACETAMINOPHEN 650 MG/1
650 SUPPOSITORY RECTAL EVERY 6 HOURS PRN
Status: DISCONTINUED | OUTPATIENT
Start: 2023-11-16 | End: 2023-11-19 | Stop reason: HOSPADM

## 2023-11-16 RX ORDER — SODIUM CHLORIDE 0.9 % (FLUSH) 0.9 %
10 SYRINGE (ML) INJECTION
Status: DISCONTINUED | OUTPATIENT
Start: 2023-11-16 | End: 2023-11-19 | Stop reason: HOSPADM

## 2023-11-16 RX ORDER — ONDANSETRON 2 MG/ML
8 INJECTION INTRAMUSCULAR; INTRAVENOUS EVERY 8 HOURS PRN
Status: DISCONTINUED | OUTPATIENT
Start: 2023-11-16 | End: 2023-11-16

## 2023-11-16 RX ORDER — DEXAMETHASONE SODIUM PHOSPHATE 4 MG/ML
2 INJECTION, SOLUTION INTRA-ARTICULAR; INTRALESIONAL; INTRAMUSCULAR; INTRAVENOUS; SOFT TISSUE EVERY 12 HOURS
Status: DISCONTINUED | OUTPATIENT
Start: 2023-11-16 | End: 2023-11-18

## 2023-11-16 RX ORDER — ENOXAPARIN SODIUM 100 MG/ML
40 INJECTION SUBCUTANEOUS EVERY 24 HOURS
Status: DISCONTINUED | OUTPATIENT
Start: 2023-11-16 | End: 2023-11-19 | Stop reason: HOSPADM

## 2023-11-16 RX ORDER — HYDROMORPHONE HYDROCHLORIDE 1 MG/ML
0.5 INJECTION, SOLUTION INTRAMUSCULAR; INTRAVENOUS; SUBCUTANEOUS EVERY 30 MIN PRN
Status: COMPLETED | OUTPATIENT
Start: 2023-11-16 | End: 2023-11-16

## 2023-11-16 RX ORDER — SODIUM CHLORIDE, SODIUM LACTATE, POTASSIUM CHLORIDE, CALCIUM CHLORIDE 600; 310; 30; 20 MG/100ML; MG/100ML; MG/100ML; MG/100ML
INJECTION, SOLUTION INTRAVENOUS CONTINUOUS
Status: DISCONTINUED | OUTPATIENT
Start: 2023-11-16 | End: 2023-11-19

## 2023-11-16 RX ORDER — SODIUM CHLORIDE 9 MG/ML
INJECTION, SOLUTION INTRAVENOUS CONTINUOUS
Status: DISCONTINUED | OUTPATIENT
Start: 2023-11-16 | End: 2023-11-16

## 2023-11-16 RX ORDER — PROCHLORPERAZINE EDISYLATE 5 MG/ML
5 INJECTION INTRAMUSCULAR; INTRAVENOUS EVERY 6 HOURS PRN
Status: DISCONTINUED | OUTPATIENT
Start: 2023-11-16 | End: 2023-11-19 | Stop reason: HOSPADM

## 2023-11-16 RX ADMIN — HYDROMORPHONE HYDROCHLORIDE 0.5 MG: 1 INJECTION, SOLUTION INTRAMUSCULAR; INTRAVENOUS; SUBCUTANEOUS at 09:11

## 2023-11-16 RX ADMIN — PROCHLORPERAZINE EDISYLATE 2.5 MG: 5 INJECTION INTRAMUSCULAR; INTRAVENOUS at 11:11

## 2023-11-16 RX ADMIN — METOCLOPRAMIDE 10 MG: 5 INJECTION, SOLUTION INTRAMUSCULAR; INTRAVENOUS at 06:11

## 2023-11-16 RX ADMIN — HYDROMORPHONE HYDROCHLORIDE 0.5 MG: 1 INJECTION, SOLUTION INTRAMUSCULAR; INTRAVENOUS; SUBCUTANEOUS at 08:11

## 2023-11-16 RX ADMIN — SODIUM CHLORIDE, POTASSIUM CHLORIDE, SODIUM LACTATE AND CALCIUM CHLORIDE: 600; 310; 30; 20 INJECTION, SOLUTION INTRAVENOUS at 05:11

## 2023-11-16 RX ADMIN — HYDROMORPHONE HYDROCHLORIDE 0.5 MG: 1 INJECTION, SOLUTION INTRAMUSCULAR; INTRAVENOUS; SUBCUTANEOUS at 03:11

## 2023-11-16 RX ADMIN — SODIUM CHLORIDE, POTASSIUM CHLORIDE, SODIUM LACTATE AND CALCIUM CHLORIDE: 600; 310; 30; 20 INJECTION, SOLUTION INTRAVENOUS at 09:11

## 2023-11-16 RX ADMIN — ENOXAPARIN SODIUM 40 MG: 40 INJECTION SUBCUTANEOUS at 05:11

## 2023-11-16 RX ADMIN — ONDANSETRON 8 MG: 2 INJECTION INTRAMUSCULAR; INTRAVENOUS at 05:11

## 2023-11-16 RX ADMIN — ACETAMINOPHEN 650 MG: 650 SUPPOSITORY RECTAL at 08:11

## 2023-11-16 RX ADMIN — PROCHLORPERAZINE EDISYLATE 5 MG: 5 INJECTION INTRAMUSCULAR; INTRAVENOUS at 11:11

## 2023-11-16 RX ADMIN — HYDROMORPHONE HYDROCHLORIDE 0.5 MG: 1 INJECTION, SOLUTION INTRAMUSCULAR; INTRAVENOUS; SUBCUTANEOUS at 05:11

## 2023-11-16 RX ADMIN — PROCHLORPERAZINE EDISYLATE 2.5 MG: 5 INJECTION INTRAMUSCULAR; INTRAVENOUS at 06:11

## 2023-11-16 RX ADMIN — HYDRALAZINE HYDROCHLORIDE 10 MG: 20 INJECTION INTRAMUSCULAR; INTRAVENOUS at 07:11

## 2023-11-16 RX ADMIN — HYDRALAZINE HYDROCHLORIDE 10 MG: 20 INJECTION INTRAMUSCULAR; INTRAVENOUS at 12:11

## 2023-11-16 RX ADMIN — SODIUM CHLORIDE: 0.9 INJECTION, SOLUTION INTRAVENOUS at 06:11

## 2023-11-16 RX ADMIN — MUPIROCIN: 20 OINTMENT TOPICAL at 08:11

## 2023-11-16 RX ADMIN — ONDANSETRON 8 MG: 2 INJECTION INTRAMUSCULAR; INTRAVENOUS at 01:11

## 2023-11-16 RX ADMIN — PROMETHAZINE HYDROCHLORIDE 12.5 MG: 12.5 SUPPOSITORY RECTAL at 11:11

## 2023-11-16 RX ADMIN — ONDANSETRON 8 MG: 2 INJECTION INTRAMUSCULAR; INTRAVENOUS at 03:11

## 2023-11-16 NOTE — HPI
Najma Hung is a 67 yo  with stage IIIB high grade serous ovarian cancer s/p 2 cycles of Gemcitabine/Carboplatin/Avastin who presents for severe abdominal pain with nausea and vomiting. The patient reports that she has been having burning like abdominal pain for 2 days that progressed to sharp pains. The pain is diffuse, constant and are aggravated by eating and moving. The patient was previously able to tolerate PO but has not been able to since the pain has worsened. At home, the patient also reports feeling feverish and having chills. She denies urinary symptoms, diarrhea, and endorses constipation. Of note, the patient was admitted - for partial small bowel obstruction.    In the ED, the patient was afebrile with other VSS. The patient received IV dilaudid X 2 and zofran which improved her symptoms.

## 2023-11-16 NOTE — ASSESSMENT & PLAN NOTE
"- Consult for advance care planning/ goals of care in patient admitted with severe abdominal pain with underlying recurrent ovarian cancer. Extensive chart review performed. Patient is currently receiving chemo, but this has been on hold due to anemia  - Along with Tatiana Spence (RN), met with Mrs. Hung in the ER. Patient was lying in bed, appeared comfortable. She is cachectic with temporal lobe wasting. Her very supportive , Cody, was at the bedside. We reflected on Mrs. Hung outside of the hospital. She has been  to her  for 37 years. 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 She enjoys tending to her plants. She enjoys integrative medicine and is currently receiving body therapy at [facility]. She finds this to be very beneficial to her healing process. She is very in touch with the mind body spirit relationship. She reports she feels that body therapy is her anecdote to modern medicine. She feels that the last 3 weeks since she received a bone marrow injection have been very difficult for her. She feels "words make worlds". She is very intuitive.   - In terms of disease, she is unsure about plan moving forward as she was scheduled to receive chemotherapy tomorrow. She knows that spots on liver were discussed, but she recalls these being present on her scans years ago. She is hopeful to avoid NG tube this hospitalization as she has had this in the past and it was very traumatizing for her. She does report constipation, she has been trying OTC/ natural remedies. She reports abdominal pain, which is currently managed with IV Dilaudid. She does feel her appetite has been "off" and she endorses weight loss. She was eating ice chips when I was present, but then began vomiting.   - Mrs. Hung reports that speaking and having someone listen to her made her feel better as she feels her medical staff does not always take her whole self into account when " caring for her.   - Our team will continue to check in on her during current hospitalization. I would recommend continued outpatient follow up with her.

## 2023-11-16 NOTE — H&P
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 The patient reports that she has been having burning like abdominal pain for 2 days that progressed to sharp pains. The pain is diffuse, constant and are aggravated by eating and moving. The patient was previously able to tolerate PO but has not been able to since the pain has worsened. At home, the patient also reports feeling feverish and having chills. She denies urinary symptoms, diarrhea, and endorses constipation. Of note, the patient was admitted - for partial small bowel obstruction.    In the ED, the patient was afebrile with other VSS. The patient received IV dilaudid X 2 and zofran which improved her symptoms.      Oncology Treatment Plan:   OP GYN BEVACIZUMAB GEMCITABINE CARBOPLATIN Q3W    Oncology History:   2020 Imaging Significant Finding   Pelvic US with multicystic enlarged ovaries bilaterally, largest 8cm, some with mural nodularity.     2020 Imaging Significant Finding   CT A/P with large (12.4 x 9.2 cm), complex, multi cystic mass in pelvis with mural nodularity and numerous septations. Numerous hypodensities in liver, favor hepatic cysts.     2020- Elap, BSO, radical cytoreduction, supracolic omentectomy, bilateral ureterolysis, staging biopsies, bilateral pelvic/para-aortic LAD, striping of pelvic/bladder peritoneum, and cystoscopy with Dr. Olivier. 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 Ovarian  "surface ruptured bilaterally. LVSI in right ovary. Metastatic disease in pelvic peritoneum, omentum, and 2/8 pelvic lymph nodes.     CARUS testing 7/25/20 specimen: HER2/Adrian negative (0), HRD negative, KRAS amplified, TP53 pathogenic variant, Microstellite stable, Mmr proficient, TMB low, BRCA1 and BRCA2 wildtype, ER negative, FOLR1 negative, PDL1 negative     -96. (Prior to chemo)       8/10/2020- 1/28/2021 chemotherapy.   8/20/2020- Course #1 of Carbo/Taxol   9/10/20- Course #2 Carbo/Taxotere  10/15/2020 - unable to tolerate combination chemo; changed to single agent carboplatin  2/10/2020 - dose reduce carboplatin to 90% - course #4 carboplatin  1/28/2021- dose reduce carboplatin to 80%; course #5 carboplatin (delayed due to GI issues)     9/8/2020- [facility] My Risk genetic testing, NEGATIVE     -54.6 (completion of chemo)      4/19/21- PET scan showed:  No evidence for FDG avid metastasis or recurrence.     5/5/21- Patient decided not to go on PARP     5/17/21- Social [employer] CDx testing: POSITIVE     11/29/21-  = 70.7     12/15/21- PET scan showed:  No evidence for FDG avid metastasis or recurrence     2/22: -665      2/17/22- CT abdomen and pelvis showed:  1. Multiple subcentimeter hypodensities throughout the liver suspicious for multifocal hepatic metastatic disease.  **I reviewed the CT scan from [facility] dated 6/2020 and compared it to [facility] CT scan dated 2/2022 with radiologist Dr. Soares. There are virtually the same numerous hepatic cysts seen in 6/2020 - 5-7 cysts all under 10mm, each cyst measures within 1-2 mm compared to the cysts diameter on current scan. These are "stable" cysts over 1+ years suggesting likely benign nature. Also, PET scan from Dec 2021 showed no FDG avid lesions. Najma decided to postpone liver biopsy and repeat  in 1 month      continued to rise 177>241.3      5/25/2022:  continues to rise >200. Decided to proceed with re-treatment with " single agent carboplatin. Course #1 carboplatin with neulasta     8/22 PET: No evidence of FDG met. disease. liver lesion not FDG avid.  Indeterminate foci of FDG in lower pelvis.       355 (8/2022) >1155 (11/22)      11/22 PET: Interval development of FDG avid pericapsular hepatic and splenic implants as well as FDG avid peritoneal and mesenteric soft tissue metastatic deposits.      12/2022- Doxil and Carbo.     4/23 PET- Interval positive response to treatment. Decrease and resolution of some of the mesenteric and peritoneal implants. Decreased FDG activity of hepatic pericapsular subcapsular metastatic disease. Resolution of previously seen. Splenic implant      4/23: -767 Doxil/Carbo x 4 completed      6/1/2023 Began Lynparza 200mg BID      6/2023:-445      6/28/2023: - 513      7/23- PET: 1. Progression of disease with increased degree of hypermetabolic activity involving the known serosal implants as well as new serosal implant adjacent to the spleen.  2. Concern for new metastatic nodule within the middle lobe    8/23: Admitted for SBO. Progression of disease visualized on imaging.    9/23: Cycle 1 of Sadler/Carbo/Avastin      10/23: Cycle 2 of Sadler/Carbo/Avastin    Past Medical History:   Diagnosis Date    CKD (chronic kidney disease)     IBS (irritable bowel syndrome)     OAB (overactive bladder)     Ovarian cancer 2020    stage iv metastatic     Past Surgical History:   Procedure Laterality Date    HYSTERECTOMY      age 28    WISDOM TOOTH EXTRACTION       Family History    None       Tobacco Use    Smoking status: Never    Smokeless tobacco: Never   Substance and Sexual Activity    Alcohol use: Never    Drug use: Never    Sexual activity: Yes       (Not in a hospital admission)      Review of patient's allergies indicates:   Allergen Reactions    Adhesive Dermatitis and Swelling     Skin adhesive (skinaffix)    Iodinated contrast media Other (See Comments), Diarrhea and Shortness Of  Breath    Iodine Other (See Comments)     FAINTING, renal issues, Bilateral flank spasms  IODINE IN CONTRAST    Chamomile flower Hives    Codeine     Dyclonine Hives    Mold Other (See Comments)    Pcn [penicillins]     Pennyroyal oil Other (See Comments)    Peppermint Other (See Comments)    Diphenhydramine hcl Rash     Sometimes rash       Review of Systems   Constitutional:  Positive for appetite change, chills and fatigue. Negative for fever.   Respiratory:  Negative for shortness of breath.    Cardiovascular:  Negative for chest pain.   Gastrointestinal:  Positive for abdominal pain, constipation, nausea and vomiting. Negative for diarrhea.   Endocrine: Negative for hot flashes.   Genitourinary:  Negative for menstrual problem, pelvic pain and vaginal bleeding.   Musculoskeletal:  Negative for back pain.   Integumentary:  Negative for breast mass, nipple discharge and breast skin changes.   Neurological:  Negative for headaches.   Hematological:  Does not bruise/bleed easily.   Psychiatric/Behavioral:  Negative for depression. The patient is not nervous/anxious.    Breast: Negative for mass, mastodynia, nipple discharge and skin changes     Objective:     Vital Signs (Most Recent):  Temp: 98.2 °F (36.8 °C) (11/16/23 0255)  Pulse: 90 (11/16/23 0702)  Resp: 18 (11/16/23 0600)  BP: (!) 144/69 (11/16/23 0702)  SpO2: 97 % (11/16/23 0702) Vital Signs (24h Range):  Temp:  [98.2 °F (36.8 °C)] 98.2 °F (36.8 °C)  Pulse:  [75-93] 90  Resp:  [14-18] 18  SpO2:  [95 %-97 %] 97 %  BP: (117-146)/(53-73) 144/69     Weight: 51.3 kg (113 lb)  Body mass index is 18.24 kg/m².       Physical Exam:   Constitutional: She is oriented to person, place, and time. She appears well-developed. No distress.    HENT:   Head: Normocephalic and atraumatic.    Eyes: EOM are normal.     Cardiovascular:  Normal rate and regular rhythm.             Pulmonary/Chest: Effort normal. No respiratory distress. She has no wheezes.        Abdominal: Soft.  Bowel sounds are normal. She exhibits no distension. There is abdominal tenderness (diffuse). There is no rebound and no guarding.             Musculoskeletal: Normal range of motion.       Neurological: She is alert and oriented to person, place, and time.    Skin: Skin is warm and dry. She is not diaphoretic.    Psychiatric: She has a normal mood and affect. Her behavior is normal. Thought content normal.        Laboratory:  Recent Lab Results         11/16/23  0321        Albumin 4.6              ALT 19       Anion Gap 17       Aniso Slight       AST 24       Bands 7.0       Baso # CANCELED  Comment: Result canceled by the ancillary.       Basophil % 0.0       BILIRUBIN TOTAL 0.5  Comment: For infants and newborns, interpretation of results should be based  on gestational age, weight and in agreement with clinical  observations.    Premature Infant recommended reference ranges:  Up to 24 hours.............<8.0 mg/dL  Up to 48 hours............<12.0 mg/dL  3-5 days..................<15.0 mg/dL  6-29 days.................<15.0 mg/dL         BUN 20       Calcium 11.2       Chloride 100       CO2 23       Creatinine 1.2       Differential Method Manual  Comment: CORRECTED RESULT; previously reported as Automated on 11/16/2023 at   03:43.    [C]       eGFR 49       Eos # CANCELED  Comment: Result canceled by the ancillary.       Eosinophil % 0.0       Glucose 207       Gran % 88.0       Hematocrit 41.6       Hemoglobin 13.5       Immature Grans (Abs) CANCELED  Comment: Mild elevation in immature granulocytes is non specific and   can be seen in a variety of conditions including stress response,   acute inflammation, trauma and pregnancy. Correlation with other   laboratory and clinical findings is essential.    Result canceled by the ancillary.         Immature Granulocytes CANCELED  Comment: Result canceled by the ancillary.       Lipase 16       Lymph # CANCELED  Comment: Result canceled by the ancillary.        Lymph % 0.0       Magnesium  1.9       MCH 32.3       MCHC 32.5              Metamyelocytes 1.0       Mono # CANCELED  Comment: Result canceled by the ancillary.       Mono % 4.0       MPV 8.6       nRBC 0       Phosphorus Level 5.2       Platelet Estimate Increased       Platelet Count 469       Potassium 4.0       PROTEIN TOTAL 8.3       RBC 4.18       RDW 16.3       Sodium 140       Stomatocytes Present       Toxic Granulation Present       WBC 38.03                [C] - Corrected Result               Diagnostic Results:  CT: Reviewed  Imaging Results              CT Abdomen Pelvis  Without Contrast (Final result)  Result time 11/16/23 06:53:25      Final result by Dario Johnson MD (11/16/23 06:53:25)                   Impression:      1. Findings in keeping with acute small bowel obstruction, with a suspected transition to decompressed distal small bowel in the anterior lower abdomen, slightly to the left of midline, with relative proximity to remote operative sequela in the anterior abdominal wall.  2. When compared to prior CT of the abdomen and pelvis performed 08/23/2023, enlarging right pleural effusion, which may be malignant.  Fluid sampling could be performed for further characterization.  3. Numerous pulmonary nodules appear grossly similar to 08/23/2023, allowing for differences in technique and scan coverage.  These may represent metastasis.  4. Numerous hepatic lesions, possibly metastasis.  New lesions suggested in the inferior right hepatic lobe.  5. Lobular soft tissue within the mesentery and anterior abdominal wall, possibly metastasis.  6. Additional details, as above.      Electronically signed by: Dario Johnson  Date:    11/16/2023  Time:    06:53               Narrative:    EXAMINATION:  CT ABDOMEN PELVIS WITHOUT CONTRAST    CLINICAL HISTORY:  Bowel obstruction suspected;    TECHNIQUE:  Low dose axial images, sagittal and coronal reformations were obtained from the lung  bases to the pubic symphysis.    COMPARISON:  CT of the abdomen and pelvis performed 08/23/2023.    FINDINGS:  This examination is limited due to lack of intravenous contrast.    Lower chest: Moderate to large right pleural effusion, increased when compared to the 08/23/2023 CT.  Numerous solid pulmonary nodules felt grossly similar given differences in technique/scan coverage and anatomic distortion imposed by the enlarging right pleural effusion.  Chronic scarring in the region of the right middle lobe and lingula.    Liver: Normal contour.  Numerous hypodense lesions concerning for metastatic disease.  Approximately 23 mm hypodense lesion in the inferior right hepatic lobe (axial series 2, image 56 appears new since the 08/23/2023 CT.    Gallbladder and bile ducts: Unremarkable.    Pancreas: Normal contour.    Spleen: Normal contour.    Adrenals: Normal contour.    Kidneys: Grossly unchanged indeterminate 13 mm hyperattenuating lesion projecting at the anterior aspect of the upper pole left kidney (series 2, image 31).  No definite evidence of radiopaque urolithiasis or obstructive uropathy at the present time.    Lymph nodes: No definite new or enlarging mesenteric and retroperitoneal lymph nodes.  Numerous prominent mesenteric and retroperitoneal lymph nodes are again noted.    Bowel and mesentery: Dilated loops of fluid-filled small bowel loops measure up to least 4 cm, as measured in the anterior mid abdomen (coronal reformat series 601, image 28).  A relative transition to decompressed small bowel suggested anterior lower abdomen, slightly to the left of midline (for example as seen on axial series 2, image 110), with relative proximity to remote operative sequela in the anterior abdominal wall.  Large volume colonic stool.  Appendix not confidently identified.  No definite focal pericecal inflammation is seen.    Suggested abnormal lobular soft tissue within the mesentery of the lower abdomen/pelvis (axial  series 2, image 107; sagittal reformat series 602, image 73), measuring on the order of 1.9 x 1.5 x 1.6 cm.    Abdominal aorta: Nonaneurysmal.  Moderate to heavy atherosclerosis.    Inferior vena cava: Unremarkable.    Free fluid or free air: None.    Pelvis: Unremarkable.    Urinary bladder: Unremarkable.    Body wall: Remote operative sequela in the anterior abdominal wall.  There is enlarging soft tissue in the anterior abdominal wall deep to remote surgical incision (axial series 2, image 90), increasing in size when compared to prior examinations measuring on the order of 2.1 x 3.1 x 2.6 cm.    Bones: No acute change.  Query osseous demineralization.                                      Assessment/Plan:     * Partial small bowel obstruction  -VSS   -CBC stable, WBC 38, Plts 469 likely due to neulasta treatment  -CMP: K 4.0 Cr 1.2, Mag 1.9, Phos 5.2   -CT: Findings in keeping with acute small bowel obstruction, with a suspected transition to decompressed distal small bowel in the anterior lower abdomen, slightly to the left of midline, with relative proximity to remote operative sequela in the anterior abdominal wall. Enlarging of known right pleural effusion, which may be malignant. Lobular soft tissue within the mesentery and anterior abdominal wall, possibly metastasis.   -Antiemetics: scheduled Zofran IV and Compazine IV  -Diet: NPO  -   -Pain control: Dilaudid PRN  -Daily CMP/Mg/Phos: Will replace electrolytes PRN with goal K >4.0, Mg > 2,  Phos >3      Malignant neoplasm of both ovaries  -See Gyn Oncology history above  -Cycle 3 of gem/carbo/avastin was scheduled to start tomorrow. Will likely be delayed given acute SBO and hospitalization.  -Lovenox daily for VTE prophylaxis     Hypertension  -BP: (117-146)/(53-73) 144/69  -no home medication  -Hydralazine PRN ordered      Chronic kidney disease (CKD)  - Cr on admit 1.2, previously baseline Cr 0.8.   - Withholding nephrotoxic agents    Ovarian  cancer  -Stage IIIB high grade serous ovarian cancer   -See GYN ONC history    Yony Reyes MD  Gynecologic Oncology  [facility] - Emergency Dept

## 2023-11-16 NOTE — Clinical Note
Diagnosis: SBO (small bowel obstruction) [246954]   Future Attending Provider: TIMOTHY MOMIN [[id number]]   Admitting Provider:: AJIT RIOS [[id number]]   Reason for IP Medical Treatment  (Clinical interventions that can only be accomplished in the IP setting? ) :: SBO   I certify that Inpatient services for greater than or equal to 2 midnights are medically necessary:: Yes   Plans for Post-Acute care--if anticipated (pick the single best option):: A. No post acute care anticipated at this time   Special Needs:: No Special Needs [1]

## 2023-11-16 NOTE — ASSESSMENT & PLAN NOTE
- Management per primary team   - Patient is hopeful to avoid NG tube as she has had this in the past and it was very traumatizing for her  - Currently NPO  - Pain is managed with IV dilaudid   - Receiving IVF

## 2023-11-16 NOTE — CARE UPDATE
11/16/2023    This patient will need 2 or more midnights in hospital  for continual care.    Yony Reyes MD PGY-2  Obstetrics and Gynecology

## 2023-11-16 NOTE — ED TRIAGE NOTES
Najma Hung, a 68 y.o. female presents to the ED c/o lower abdominal pain associated with nausea and vomiting.      Chief Complaint   Patient presents with    Abdominal Pain     Reports lower abd pain with vomiting and nausea, last BM yesterday (small, pebble-like). Took zofran and phenergan with no improvement. AAOx4, cool, pale, clammy skin. Currently getting chemotherapy for ovarian CA.      Review of patient's allergies indicates:   Allergen Reactions    Adhesive Dermatitis and Swelling     Skin adhesive (skinaffix)    Iodinated contrast media Other (See Comments), Diarrhea and Shortness Of Breath    Iodine Other (See Comments)     FAINTING, renal issues, Bilateral flank spasms  IODINE IN CONTRAST    Chamomile flower Hives    Codeine     Dyclonine Hives    Mold Other (See Comments)    Pcn [penicillins]     Pennyroyal oil Other (See Comments)    Peppermint Other (See Comments)    Diphenhydramine hcl Rash     Sometimes rash     Past Medical History:   Diagnosis Date    CKD (chronic kidney disease)     IBS (irritable bowel syndrome)     OAB (overactive bladder)     Ovarian cancer 2020    stage iv metastatic

## 2023-11-16 NOTE — PLAN OF CARE
MSW met with the patient at the bedside. The patient's  Cody is also at the bedside.     Patient is alert and oriented with no communication barriers.     Patient denies having DME at home.     Patients PCP is correct on the face sheet. Patient choice pharmacy is [facility].     Patients' family will transport the patient home at discharge.     CM team will continue to follow.          11/16/23 9493   Discharge Assessment   Assessment Type Discharge Planning Assessment   Confirmed/corrected address, phone number and insurance Yes   Confirmed Demographics Correct on Facesheet   Source of Information patient;family;health record   People in Home significant other   Do you expect to return to your current living situation? Yes   Do you have help at home or someone to help you manage your care at home? Yes   Who are your caregiver(s) and their phone number(s)?  Cody   Prior to hospitilization cognitive status: Alert/Oriented   Current cognitive status: Alert/Oriented   Equipment Currently Used at Home none   Readmission within 30 days? No   Patient currently being followed by outpatient case management? No   Do you currently have service(s) that help you manage your care at home? No   Do you take prescription medications? No   Do you have prescription coverage? No   Do you have any problems affording any of your prescribed medications? No   Is the patient taking medications as prescribed? yes   How do you get to doctors appointments? car, drives self;family or friend will provide   Are you on dialysis? No   Do you take coumadin? No   DME Needed Upon Discharge  none   Discharge Plan discussed with: Patient;Spouse/sig other   Transition of Care Barriers None   Discharge Plan A Home with family   Discharge Plan B Home Health

## 2023-11-16 NOTE — PLAN OF CARE
11/16/23 1504   Medicare Message   Important Message from Medicare regarding Discharge Appeal Rights Given to patient/caregiver;Explained to patient/caregiver;Signed/date by patient/caregiver   Date IMM was signed 11/16/23   Time IMM was signed 1549

## 2023-11-16 NOTE — ED PROVIDER NOTES
"  Source of History:  Medical record, patient  Independent history obtained from : spouse    Chief complaint:  Per triage note: "Abdominal Pain (Reports lower abd pain with vomiting and nausea, last BM yesterday (small, pebble-like). Took zofran and phenergan with no improvement. AAOx4, cool, pale, clammy skin. Currently getting chemotherapy for ovarian CA. )  "    HPI:    Patient presents for evaluation of associated with nausea, vomiting, increased abdominal distention over the past day.  No clear inciting factor.  She denies any sick contacts, travel, suspicious intake.  Character is similar to when she had a suspected small bowel obstruction.  Notes some diaphoresis, but this was immediately around her having vomiting.  Symptoms persist despite Zofran, Phenergan.  She denies any urinary symptoms including decreased urination.      ROS:   See HPI for pertinent review of systems        Review of patient's allergies indicates:   Allergen Reactions    Adhesive Dermatitis and Swelling     Skin adhesive (skinaffix)    Iodinated contrast media Other (See Comments), Diarrhea and Shortness Of Breath    Iodine Other (See Comments)     FAINTING, renal issues, Bilateral flank spasms  IODINE IN CONTRAST    Chamomile flower Hives    Codeine     Dyclonine Hives    Mold Other (See Comments)    Pcn [penicillins]     Pennyroyal oil Other (See Comments)    Peppermint Other (See Comments)    Diphenhydramine hcl Rash     Sometimes rash       PMH:  As per HPI and below:  Past Medical History:   Diagnosis Date    CKD (chronic kidney disease)     IBS (irritable bowel syndrome)     OAB (overactive bladder)     Ovarian cancer 2020    stage iv metastatic       Past Surgical History:   Procedure Laterality Date    HYSTERECTOMY      age 28    WISDOM TOOTH EXTRACTION         Social History     Tobacco Use    Smoking status: Never    Smokeless tobacco: Never   Substance Use Topics    Alcohol use: Never    Drug use: Never       Physical Exam:  " "    Nursing note and vitals reviewed.  BP (!) 143/71 (BP Location: Left arm, Patient Position: Lying)   Pulse 75   Temp 98.2 °F (36.8 °C) (Oral)   Resp 17   Ht 5' 6" (1.676 m)   Wt 51.3 kg (113 lb)   SpO2 95%   Breastfeeding No   BMI 18.24 kg/m²     Constitutional:  Uncomfortable appearance.  No distress.  Eyes: EOMI. No discharge. Anicteric.  HENT:   Neck: Normal range of motion. Neck supple.  Cardiovascular: Normal rate. No murmur, no gallop and no friction rub heard.   Pulmonary/Chest: No respiratory distress. Effort normal. No wheezes, no rales, no rhonchi.   Abdominal: Bowel sounds normal. Soft. No distension and no mass. There is mild diffuse abdominal tenderness. There is no rebound, no guarding, no tenderness at McBurney's point.  Neurological: GCS 15. Alert and oriented to person, place, and time. No gross cranial nerve, light touch or strength deficit. Coordination normal.   Skin: Skin is warm and dry.  Pallor.  EXT: 2+ radial pulses.   Psychiatric: Behavior is normal. Judgment normal.        Medical Decision Making / Independent Interpretations / External Records Reviewed:      Pt is a 68 y.o. F with Stage IIIC high-grade serous ovarian cancer s/p BSO/Omentectomy/Peritoneal stripping and debulking on chemo, CKD, irritable syndrome who presents with abdominal pain, nausea, vomiting distention over the past days similar to when she was previously diagnosed with a partial small-bowel obstruction.  ED Course as of 11/16/23 0607   Thu Nov 16, 2023   6393 I independently reviewed and interpreted labs which are notable for leukocytosis with WBC 38k, mild hyperglycemia without DKA, mild hypercalcemia (corrects to 10.7).    Patient history, findings, results, patient management discussed with GynOnc resident Dr. Beasley.  She anticipates admit her pending CT read.    []   0605 I independently interpreted and reviewed the patient's CT abdomen/pelvis, notable for large loops of bowel concerning for acute " bowel obstruction.  Final read pending.       Dr. Beasley admitted the patient.       =====================================  Critical Care:  30 minutes total critical care time was personally spent by me, exclusive of procedures and separately billable time.   Critical care was necessary to treat or prevent imminent or life-threatening deterioration of the following conditions:  bowel obstruction   =====================================     [RC]      ED Course User Index  [RC] Rg Hutson MD       --  I decided to obtain the patient's medical records. I reviewed patient's prior external notes / results: specialist note   .   --  Additional Medical Decision Making: Hospitalization or escalation of care considered    Medications   HYDROmorphone injection 0.5 mg (0.5 mg Intravenous Given 11/16/23 0533)   sodium chloride 0.9% flush 10 mL (has no administration in time range)   0.9%  NaCl infusion ( Intravenous New Bag 11/16/23 0602)   ondansetron injection 8 mg (has no administration in time range)   prochlorperazine injection Soln 5 mg (has no administration in time range)   melatonin tablet 6 mg (has no administration in time range)   HYDROmorphone injection 0.5 mg (has no administration in time range)   ondansetron injection 8 mg (8 mg Intravenous Given 11/16/23 0330)   metoclopramide HCl injection 10 mg (10 mg Intravenous Given 11/16/23 0601)              Future Appointments   Date Time Provider Department Center   11/17/2023 10:00 AM CHEMO 39, [facility]   11/22/2023  9:30 AM LAB, SAME DAY Counts include 234 beds at the [facility]   11/24/2023  8:00 AM NURSE 9, [facility]        Diagnostic Impression:    1. SBO (small bowel obstruction)         ED Disposition Condition    Observation                   Rg Hutson MD  11/16/23 0608

## 2023-11-16 NOTE — SUBJECTIVE & OBJECTIVE
Interval History: Patient seen in the ED. Reports nausea with vomiting.     Past Medical History:   Diagnosis Date    CKD (chronic kidney disease)     IBS (irritable bowel syndrome)     OAB (overactive bladder)     Ovarian cancer 2020    stage iv metastatic       Past Surgical History:   Procedure Laterality Date    HYSTERECTOMY      age 28    WISDOM TOOTH EXTRACTION         Review of patient's allergies indicates:   Allergen Reactions    Adhesive Dermatitis and Swelling     Skin adhesive (skinaffix)    Iodinated contrast media Other (See Comments), Diarrhea and Shortness Of Breath    Iodine Other (See Comments)     FAINTING, renal issues, Bilateral flank spasms  IODINE IN CONTRAST    Chamomile flower Hives    Codeine     Dyclonine Hives    Mold Other (See Comments)    Pcn [penicillins]     Pennyroyal oil Other (See Comments)    Peppermint Other (See Comments)    Diphenhydramine hcl Rash     Sometimes rash       Medications:  Continuous Infusions:   lactated ringers 125 mL/hr at 11/16/23 0917     Scheduled Meds:   dexAMETHasone  2 mg Intravenous Q12H    enoxparin  40 mg Subcutaneous Q24H (prophylaxis, 1700)    mupirocin   Nasal BID    ondansetron  8 mg Intravenous Q6H    prochlorperazine  2.5 mg Intravenous Q6H     PRN Meds:hydrALAZINE, HYDROmorphone, melatonin, prochlorperazine, sodium chloride 0.9%    Family History    None       Tobacco Use    Smoking status: Never    Smokeless tobacco: Never   Substance and Sexual Activity    Alcohol use: Never    Drug use: Never    Sexual activity: Yes       Review of Systems   Constitutional:  Positive for activity change, appetite change, fatigue and unexpected weight change.   Respiratory:  Negative for cough and shortness of breath.    Gastrointestinal:  Positive for abdominal pain, constipation, nausea and vomiting.   Neurological:  Negative for weakness.     Objective:     Vital Signs (Most Recent):  Temp: 98 °F (36.7 °C) (11/16/23 1400)  Pulse: 77 (11/16/23 1400)  Resp:  16 (11/16/23 1400)  BP: (!) 176/77 (11/16/23 1400)  SpO2: 98 % (11/16/23 1400) Vital Signs (24h Range):  Temp:  [98 °F (36.7 °C)-98.2 °F (36.8 °C)] 98 °F (36.7 °C)  Pulse:  [71-93] 77  Resp:  [14-18] 16  SpO2:  [95 %-98 %] 98 %  BP: (117-196)/(53-84) 176/77     Weight: 51.3 kg (113 lb)  Body mass index is 18.24 kg/m².       Physical Exam  Constitutional:       Comments: Frail/ cachectic  Temporal lobe wasting    Cardiovascular:      Rate and Rhythm: Normal rate.   Pulmonary:      Effort: No respiratory distress.   Neurological:      Mental Status: She is alert and oriented to person, place, and time.      Comments: Very pleasant and insightful             Review of Symptoms      Symptom Assessment (ESAS 0-10 Scale)  Anorexia:  1  Fatigue:  5       Constipation:  Constipation    Living Arrangements:  Lives with spouse    Psychosocial/Cultural:   See Palliative Psychosocial Note: Yes  Patient lives with her  of 37 years, Cody. She worked as a mental health counselor.   **Primary  to Follow**  Palliative Care  Consult: No        Advance Care Planning   Advance Directives:   Living Will: Yes        Copy on chart: Yes    Medical Power of : Yes    Agent's Name:  Cody Ferrara    Decision Making:  Patient answered questions  Goals of Care: The patient endorses that what is most important right now is to focus on spending time at home and remaining as independent as possible    Accordingly, we have decided that the best plan to meet the patient's goals includes continuing with treatment         Significant Labs: All pertinent labs within the past 24 hours have been reviewed.  CBC:   Recent Labs   Lab 11/16/23  0321   WBC 38.03*   HGB 13.5   HCT 41.6   *   *     BMP:  Recent Labs   Lab 11/16/23  0321   *      K 4.0      CO2 23   BUN 20   CREATININE 1.2   CALCIUM 11.2*   MG 1.9     LFT:  Lab Results   Component Value Date    AST 24 11/16/2023    ALKPHOS  "148 (H) 11/16/2023    BILITOT 0.5 11/16/2023     Albumin:   Albumin   Date Value Ref Range Status   11/16/2023 4.6 3.5 - 5.2 g/dL Final     Protein:   Total Protein   Date Value Ref Range Status   11/16/2023 8.3 6.0 - 8.4 g/dL Final     Lactic acid:   No results found for: "LACTATE"    Significant Imaging: I have reviewed all pertinent imaging results/findings within the past 24 hours.    "

## 2023-11-16 NOTE — ASSESSMENT & PLAN NOTE
- Patient originally diagnosed in 2020 with recurrence of disease in 2022.   - Managed by Dr. Hodgson  - Patient has been receiving chemo, but this is currently held due to lab results. Patient was scheduled to receive chemo 11/17

## 2023-11-16 NOTE — NURSING
Patient arrived to unit from ED via Transport.  Patients spouse at bedside.  Patient arrived on 2L O2 via NC.  BP elevated 176/77, Dr. Beasley notified, no new orders at this time just monitor BP and if systolic BP is greater than 180 administer prn hydralazine Q6 hrs. No complaints of pain noted or nausea and vomiting.  Patient ambulated to bathroom with stand-by assistance.  LR infusing at 125 ml/hr.  Safety maintained, bed in lowest position, call bell within reach.  Patient instructed to call for assistance when needed.

## 2023-11-16 NOTE — ED NOTES
Patient is A&Ox4. ED workup in progress. Connected to cardiac monitor. VSS. Safety measures in place; side rails up x2. Call light within pt reach. Plan of care ongoing.

## 2023-11-16 NOTE — ASSESSMENT & PLAN NOTE
-VSS   -CBC stable, WBC 38, Plts 469 likely due to neulasta treatment  -CMP: K 4.0 Cr 1.2, Mag 1.9, Phos 5.2   -CT: Findings in keeping with acute small bowel obstruction, with a suspected transition to decompressed distal small bowel in the anterior lower abdomen, slightly to the left of midline, with relative proximity to remote operative sequela in the anterior abdominal wall. Enlarging of known right pleural effusion, which may be malignant. Lobular soft tissue within the mesentery and anterior abdominal wall, possibly metastasis.   -Antiemetics: scheduled Zofran IV and Compazine IV  -Diet: NPO  -   -Pain control: Dilaudid PRN  -Daily CMP/Mg/Phos: Will replace electrolytes PRN with goal K >4.0, Mg > 2,  Phos >3

## 2023-11-16 NOTE — HPI
"Per H&P: "History of Present Illness:  Najma Hung is a 69 yo  with stage IIIB high grade serous ovarian cancer s/p 2 cycles of Gemcitabine/Carboplatin/Avastin who presents for severe abdominal pain with nausea and vomiting. The patient reports that she has been having burning like abdominal pain for 2 days that progressed to sharp pains. The pain is diffuse, constant and are aggravated by eating and moving. The patient was previously able to tolerate PO but has not been able to since the pain has worsened. At home, the patient also reports feeling feverish and having chills. She denies urinary symptoms, diarrhea, and endorses constipation. Of note, the patient was admitted - for partial small bowel obstruction.     In the ED, the patient was afebrile with other VSS. The patient received IV dilaudid X 2 and zofran which improved her symptoms."    At time of initial consult, patient seen in the ED. Patient is very pleasant. Palliative medicine consulted for goals of care/ advance care planning.   "

## 2023-11-16 NOTE — CONSULTS
[facility] ([place])  Palliative Medicine  Consult Note    Patient Name: Najma Hung  MRN: [id number]  Admission Date: 11/16/2023  Hospital Length of Stay: 0 days  Code Status: Full Code   Attending Provider: Hesham Bolanos MD  Consulting Provider: Cassy Gomez DNP  Primary Care Physician: Neymar Cash III, MD  Principal Problem:Partial small bowel obstruction    Patient information was obtained from patient, spouse/SO, and primary team.      Inpatient consult to Palliative Care  Consult performed by: Cassy Gomez DNP  Consult ordered by: Eliza Morse MD        Assessment/Plan:     Oncology  Ovarian cancer  - Patient originally diagnosed in 2020 with recurrence of disease in 2022.   - Managed by Dr. Hodgson  - Patient has been receiving chemo, but this is currently held due to lab results. Patient was scheduled to receive chemo 11/17     GI  * Partial small bowel obstruction  - Management per primary team   - Patient is hopeful to avoid NG tube as she has had this in the past and it was very traumatizing for her  - Currently NPO  - Pain is managed with IV dilaudid   - Receiving IVF       Palliative Care  Encounter for palliative care  - Consult for advance care planning/ goals of care in patient admitted with severe abdominal pain with underlying recurrent ovarian cancer. Extensive chart review performed. Patient is currently receiving chemo, but this has been on hold due to anemia  - Along with Tatiana Spence (RN), met with Mrs. Hung in the ER. Patient was lying in bed, appeared comfortable. She is cachectic with temporal lobe wasting. Her very supportive , Cody, was at the bedside. We reflected on Mrs. Hung outside of the hospital. She has been  to her  for 37 years. 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 She enjoys tending to her plants. She enjoys integrative medicine and is currently receiving body therapy  "at [facility]. She finds this to be very beneficial to her healing process. She is very in touch with the mind body spirit relationship. She reports she feels that body therapy is her anecdote to modern medicine. She feels that the last 3 weeks since she received a bone marrow injection have been very difficult for her. She feels "words make worlds". She is very intuitive.   - In terms of disease, she is unsure about plan moving forward as she was scheduled to receive chemotherapy tomorrow. She knows that spots on liver were discussed, but she recalls these being present on her scans years ago. She is hopeful to avoid NG tube this hospitalization as she has had this in the past and it was very traumatizing for her. She does report constipation, she has been trying OTC/ natural remedies. She reports abdominal pain, which is currently managed with IV Dilaudid. She does feel her appetite has been "off" and she endorses weight loss. She was eating ice chips when I was present, but then began vomiting.   - Mrs. Hung reports that speaking and having someone listen to her made her feel better as she feels her medical staff does not always take her whole self into account when caring for her.   - Our team will continue to check in on her during current hospitalization. I would recommend continued outpatient follow up with her.         Thank you for your consult.     Subjective:     HPI:   Per H&P: "History of Present Illness:  Najma Hung is a 67 yo  with stage IIIB high grade serous ovarian cancer s/p 2 cycles of Gemcitabine/Carboplatin/Avastin who presents for severe abdominal pain with nausea and vomiting. The patient reports that she has been having burning like abdominal pain for 2 days that progressed to sharp pains. The pain is diffuse, constant and are aggravated by eating and moving. The patient was previously able to tolerate PO but has not been able to since the pain has worsened. At home, the patient " "also reports feeling feverish and having chills. She denies urinary symptoms, diarrhea, and endorses constipation. Of note, the patient was admitted 8/23-8/26 for partial small bowel obstruction.     In the ED, the patient was afebrile with other VSS. The patient received IV dilaudid X 2 and zofran which improved her symptoms."    At time of initial consult, patient seen in the ED. Patient is very pleasant. Palliative medicine consulted for goals of care/ advance care planning.     Hospital Course:  No notes on file    Interval History: Patient seen in the ED. Reports nausea with vomiting.     Past Medical History:   Diagnosis Date    CKD (chronic kidney disease)     IBS (irritable bowel syndrome)     OAB (overactive bladder)     Ovarian cancer 2020    stage iv metastatic       Past Surgical History:   Procedure Laterality Date    HYSTERECTOMY      age 28    WISDOM TOOTH EXTRACTION         Review of patient's allergies indicates:   Allergen Reactions    Adhesive Dermatitis and Swelling     Skin adhesive (skinaffix)    Iodinated contrast media Other (See Comments), Diarrhea and Shortness Of Breath    Iodine Other (See Comments)     FAINTING, renal issues, Bilateral flank spasms  IODINE IN CONTRAST    Chamomile flower Hives    Codeine     Dyclonine Hives    Mold Other (See Comments)    Pcn [penicillins]     Pennyroyal oil Other (See Comments)    Peppermint Other (See Comments)    Diphenhydramine hcl Rash     Sometimes rash       Medications:  Continuous Infusions:   lactated ringers 125 mL/hr at 11/16/23 0917     Scheduled Meds:   dexAMETHasone  2 mg Intravenous Q12H    enoxparin  40 mg Subcutaneous Q24H (prophylaxis, 1700)    mupirocin   Nasal BID    ondansetron  8 mg Intravenous Q6H    prochlorperazine  2.5 mg Intravenous Q6H     PRN Meds:hydrALAZINE, HYDROmorphone, melatonin, prochlorperazine, sodium chloride 0.9%    Family History    None       Tobacco Use    Smoking status: Never    Smokeless tobacco: Never "   Substance and Sexual Activity    Alcohol use: Never    Drug use: Never    Sexual activity: Yes       Review of Systems   Constitutional:  Positive for activity change, appetite change, fatigue and unexpected weight change.   Respiratory:  Negative for cough and shortness of breath.    Gastrointestinal:  Positive for abdominal pain, constipation, nausea and vomiting.   Neurological:  Negative for weakness.     Objective:     Vital Signs (Most Recent):  Temp: 98 °F (36.7 °C) (11/16/23 1400)  Pulse: 77 (11/16/23 1400)  Resp: 16 (11/16/23 1400)  BP: (!) 176/77 (11/16/23 1400)  SpO2: 98 % (11/16/23 1400) Vital Signs (24h Range):  Temp:  [98 °F (36.7 °C)-98.2 °F (36.8 °C)] 98 °F (36.7 °C)  Pulse:  [71-93] 77  Resp:  [14-18] 16  SpO2:  [95 %-98 %] 98 %  BP: (117-196)/(53-84) 176/77     Weight: 51.3 kg (113 lb)  Body mass index is 18.24 kg/m².       Physical Exam  Constitutional:       Comments: Frail/ cachectic  Temporal lobe wasting    Cardiovascular:      Rate and Rhythm: Normal rate.   Pulmonary:      Effort: No respiratory distress.   Neurological:      Mental Status: She is alert and oriented to person, place, and time.      Comments: Very pleasant and insightful             Review of Symptoms      Symptom Assessment (ESAS 0-10 Scale)  Anorexia:  1  Fatigue:  5       Constipation:  Constipation    Living Arrangements:  Lives with spouse    Psychosocial/Cultural:   See Palliative Psychosocial Note: Yes  Patient lives with her  of 37 years, Cody. She worked as a mental health counselor.   **Primary  to Follow**  Palliative Care  Consult: No        Advance Care Planning  Advance Directives:   Living Will: Yes        Copy on chart: Yes    Medical Power of : Yes    Agent's Name:  Cody Ferrara    Decision Making:  Patient answered questions  Goals of Care: The patient endorses that what is most important right now is to focus on spending time at home and remaining as independent  "as possible    Accordingly, we have decided that the best plan to meet the patient's goals includes continuing with treatment         Significant Labs: All pertinent labs within the past 24 hours have been reviewed.  CBC:   Recent Labs   Lab 11/16/23 0321   WBC 38.03*   HGB 13.5   HCT 41.6   *   *     BMP:  Recent Labs   Lab 11/16/23 0321   *      K 4.0      CO2 23   BUN 20   CREATININE 1.2   CALCIUM 11.2*   MG 1.9     LFT:  Lab Results   Component Value Date    AST 24 11/16/2023    ALKPHOS 148 (H) 11/16/2023    BILITOT 0.5 11/16/2023     Albumin:   Albumin   Date Value Ref Range Status   11/16/2023 4.6 3.5 - 5.2 g/dL Final     Protein:   Total Protein   Date Value Ref Range Status   11/16/2023 8.3 6.0 - 8.4 g/dL Final     Lactic acid:   No results found for: "LACTATE"    Significant Imaging: I have reviewed all pertinent imaging results/findings within the past 24 hours.     > 50% of 70 min visit spent in chart review, face to face discussion of symptom assessment, coordination of care with other specialists, and d/c planning      Cassy Gomez DNP  Palliative Medicine  [facility] ([place])              "

## 2023-11-16 NOTE — ASSESSMENT & PLAN NOTE
"- Consult for advance care planning/ goals of care in patient admitted with severe abdominal pain with underlying recurrent ovarian cancer. Extensive chart review performed. Patient is currently receiving chemo, but this has been on hold due to anemia  - Along with Tatiana Spence (RN), met with Mrs. Hung in the ER. Patient was lying in bed, appeared comfortable. She is cachectic with temporal lobe wasting. Her very supportive , Cody, was at the bedside. We reflected on Mrs. Hung outside of the hospital. She has been  to her  for 37 years. 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 She enjoys tending to her plants. She enjoys integrative medicine and is currently receiving body therapy at [facility]. She finds this to be very beneficial to her healing process. She is very in touch with the mind body spirit relationship. She reports she feels that body therapy is her anecdote to modern medicine. She feels that the last 3 weeks since she received a bone marrow injection have been very difficult for her. She feels "words make worlds". She is very intuitive.   - In terms of disease, she is unsure about plan moving forward as she was scheduled to receive chemotherapy tomorrow. She knows that spots on liver were discussed, but she recalls these being present on her scans years ago. She is hopeful to avoid NG tube this hospitalization as she has had this in the past and it was very traumatizing for her. She does report constipation, she has been trying OTC/ natural remedies. She reports abdominal pain, which is currently managed with IV Dilaudid. She does feel her appetite has been "off" and she endorses weight loss. She was eating ice chips when I was present, but then began vomiting.   - Mrs. Hung reports that speaking and having someone listen to her made her feel better as she feels her medical staff does not always take her whole self into account when " caring for her.   - Our team will continue to check in on her during current hospitalization. I would recommend continued outpatient follow up with her.

## 2023-11-16 NOTE — SUBJECTIVE & OBJECTIVE
Oncology Treatment Plan:   OP GYN BEVACIZUMAB GEMCITABINE CARBOPLATIN Q3W    Oncology History:   5/29/2020 Imaging Significant Finding   Pelvic US with multicystic enlarged ovaries bilaterally, largest 8cm, some with mural nodularity.     6/5/2020 Imaging Significant Finding   CT A/P with large (12.4 x 9.2 cm), complex, multi cystic mass in pelvis with mural nodularity and numerous septations. Numerous hypodensities in liver, favor hepatic cysts.     6/25/2020- Elap, BSO, radical cytoreduction, supracolic omentectomy, bilateral ureterolysis, staging biopsies, bilateral pelvic/para-aortic LAD, striping of pelvic/bladder peritoneum, and cystoscopy with Dr. Olivier. 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 Ovarian surface ruptured bilaterally. LVSI in right ovary. Metastatic disease in pelvic peritoneum, omentum, and 2/8 pelvic lymph nodes.     CARUS testing 7/25/20 specimen: HER2/Adrian negative (0), HRD negative, KRAS amplified, TP53 pathogenic variant, Microstellite stable, Mmr proficient, TMB low, BRCA1 and BRCA2 wildtype, ER negative, FOLR1 negative, PDL1 negative     -45. (Prior to chemo)       8/10/2020- 1/28/2021 chemotherapy.   8/20/2020- Course #1 of Carbo/Taxol   9/10/20- Course #2 Carbo/Taxotere  10/15/2020 - unable to tolerate combination chemo; changed to single agent carboplatin  2/10/2020 - dose reduce carboplatin to 90% - course #4 carboplatin  1/28/2021- dose reduce carboplatin to 80%; course #5 carboplatin (delayed due to GI issues)     9/8/2020- [facility] My Risk genetic testing, NEGATIVE     -65.6 (completion of chemo)      4/19/21- PET scan showed:  No evidence for FDG avid metastasis or recurrence.     5/5/21- Patient decided not to go on PARP     5/17/21- [facility] CDx testing: POSITIVE     11/29/21-  = 70.7     12/15/21- PET scan showed:  No evidence for FDG avid metastasis or recurrence     2/22: -674      2/17/22- CT  "abdomen and pelvis showed:  1. Multiple subcentimeter hypodensities throughout the liver suspicious for multifocal hepatic metastatic disease.  **I reviewed the CT scan from [facility] dated 6/2020 and compared it to [facility] CT scan dated 2/2022 with radiologist Dr. Soares. There are virtually the same numerous hepatic cysts seen in 6/2020 - 5-7 cysts all under 10mm, each cyst measures within 1-2 mm compared to the cysts diameter on current scan. These are "stable" cysts over 1+ years suggesting likely benign nature. Also, PET scan from Dec 2021 showed no FDG avid lesions. Najma decided to postpone liver biopsy and repeat  in 1 month      continued to rise 177>241.3      5/25/2022:  continues to rise >200. Decided to proceed with re-treatment with single agent carboplatin. Course #1 carboplatin with neulasta     8/22 PET: No evidence of FDG met. disease. liver lesion not FDG avid.  Indeterminate foci of FDG in lower pelvis.       355 (8/2022) >1155 (11/22)      11/22 PET: Interval development of FDG avid pericapsular hepatic and splenic implants as well as FDG avid peritoneal and mesenteric soft tissue metastatic deposits.      12/2022- Doxil and Carbo.     4/23 PET- Interval positive response to treatment. Decrease and resolution of some of the mesenteric and peritoneal implants. Decreased FDG activity of hepatic pericapsular subcapsular metastatic disease. Resolution of previously seen. Splenic implant      4/23: -095 Doxil/Carbo x 4 completed      6/1/2023 Began Lynparza 200mg BID      6/2023:-445      6/28/2023: - 513      7/23- PET: 1. Progression of disease with increased degree of hypermetabolic activity involving the known serosal implants as well as new serosal implant adjacent to the spleen.  2. Concern for new metastatic nodule within the middle lobe    8/23: Admitted for SBO. Progression of disease visualized on imaging.    9/23: Cycle 1 of [place]/Carbo/Avastin      10/23: " Cycle 2 of [place]/Carbo/Avastin    Past Medical History:   Diagnosis Date    CKD (chronic kidney disease)     IBS (irritable bowel syndrome)     OAB (overactive bladder)     Ovarian cancer 2020    stage iv metastatic     Past Surgical History:   Procedure Laterality Date    HYSTERECTOMY      age 28    WISDOM TOOTH EXTRACTION       Family History    None       Tobacco Use    Smoking status: Never    Smokeless tobacco: Never   Substance and Sexual Activity    Alcohol use: Never    Drug use: Never    Sexual activity: Yes       (Not in a hospital admission)      Review of patient's allergies indicates:   Allergen Reactions    Adhesive Dermatitis and Swelling     Skin adhesive (skinaffix)    Iodinated contrast media Other (See Comments), Diarrhea and Shortness Of Breath    Iodine Other (See Comments)     FAINTING, renal issues, Bilateral flank spasms  IODINE IN CONTRAST    Chamomile flower Hives    Codeine     Dyclonine Hives    Mold Other (See Comments)    Pcn [penicillins]     Pennyroyal oil Other (See Comments)    Peppermint Other (See Comments)    Diphenhydramine hcl Rash     Sometimes rash       Review of Systems   Constitutional:  Positive for appetite change, chills and fatigue. Negative for fever.   Respiratory:  Negative for shortness of breath.    Cardiovascular:  Negative for chest pain.   Gastrointestinal:  Positive for abdominal pain, constipation, nausea and vomiting. Negative for diarrhea.   Endocrine: Negative for hot flashes.   Genitourinary:  Negative for menstrual problem, pelvic pain and vaginal bleeding.   Musculoskeletal:  Negative for back pain.   Integumentary:  Negative for breast mass, nipple discharge and breast skin changes.   Neurological:  Negative for headaches.   Hematological:  Does not bruise/bleed easily.   Psychiatric/Behavioral:  Negative for depression. The patient is not nervous/anxious.    Breast: Negative for mass, mastodynia, nipple discharge and skin changes     Objective:      Vital Signs (Most Recent):  Temp: 98.2 °F (36.8 °C) (11/16/23 0255)  Pulse: 90 (11/16/23 0702)  Resp: 18 (11/16/23 0600)  BP: (!) 144/69 (11/16/23 0702)  SpO2: 97 % (11/16/23 0702) Vital Signs (24h Range):  Temp:  [98.2 °F (36.8 °C)] 98.2 °F (36.8 °C)  Pulse:  [75-93] 90  Resp:  [14-18] 18  SpO2:  [95 %-97 %] 97 %  BP: (117-146)/(53-73) 144/69     Weight: 51.3 kg (113 lb)  Body mass index is 18.24 kg/m².       Physical Exam:   Constitutional: She is oriented to person, place, and time. She appears well-developed. No distress.    HENT:   Head: Normocephalic and atraumatic.    Eyes: EOM are normal.     Cardiovascular:  Normal rate and regular rhythm.             Pulmonary/Chest: Effort normal. No respiratory distress. She has no wheezes.        Abdominal: Soft. Bowel sounds are normal. She exhibits no distension. There is abdominal tenderness (diffuse). There is no rebound and no guarding.             Musculoskeletal: Normal range of motion.       Neurological: She is alert and oriented to person, place, and time.    Skin: Skin is warm and dry. She is not diaphoretic.    Psychiatric: She has a normal mood and affect. Her behavior is normal. Thought content normal.        Laboratory:  Recent Lab Results         11/16/23  0321        Albumin 4.6              ALT 19       Anion Gap 17       Aniso Slight       AST 24       Bands 7.0       Baso # CANCELED  Comment: Result canceled by the ancillary.       Basophil % 0.0       BILIRUBIN TOTAL 0.5  Comment: For infants and newborns, interpretation of results should be based  on gestational age, weight and in agreement with clinical  observations.    Premature Infant recommended reference ranges:  Up to 24 hours.............<8.0 mg/dL  Up to 48 hours............<12.0 mg/dL  3-5 days..................<15.0 mg/dL  6-29 days.................<15.0 mg/dL         BUN 20       Calcium 11.2       Chloride 100       CO2 23       Creatinine 1.2       Differential Method  Manual  Comment: CORRECTED RESULT; previously reported as Automated on 11/16/2023 at   03:43.    [C]       eGFR 49       Eos # CANCELED  Comment: Result canceled by the ancillary.       Eosinophil % 0.0       Glucose 207       Gran % 88.0       Hematocrit 41.6       Hemoglobin 13.5       Immature Grans (Abs) CANCELED  Comment: Mild elevation in immature granulocytes is non specific and   can be seen in a variety of conditions including stress response,   acute inflammation, trauma and pregnancy. Correlation with other   laboratory and clinical findings is essential.    Result canceled by the ancillary.         Immature Granulocytes CANCELED  Comment: Result canceled by the ancillary.       Lipase 16       Lymph # CANCELED  Comment: Result canceled by the ancillary.       Lymph % 0.0       Magnesium  1.9       MCH 32.3       MCHC 32.5              Metamyelocytes 1.0       Mono # CANCELED  Comment: Result canceled by the ancillary.       Mono % 4.0       MPV 8.6       nRBC 0       Phosphorus Level 5.2       Platelet Estimate Increased       Platelet Count 469       Potassium 4.0       PROTEIN TOTAL 8.3       RBC 4.18       RDW 16.3       Sodium 140       Stomatocytes Present       Toxic Granulation Present       WBC 38.03                [C] - Corrected Result               Diagnostic Results:  CT: Reviewed  Imaging Results              CT Abdomen Pelvis  Without Contrast (Final result)  Result time 11/16/23 06:53:25      Final result by Dario Johnson MD (11/16/23 06:53:25)                   Impression:      1. Findings in keeping with acute small bowel obstruction, with a suspected transition to decompressed distal small bowel in the anterior lower abdomen, slightly to the left of midline, with relative proximity to remote operative sequela in the anterior abdominal wall.  2. When compared to prior CT of the abdomen and pelvis performed 08/23/2023, enlarging right pleural effusion, which may be malignant.   Fluid sampling could be performed for further characterization.  3. Numerous pulmonary nodules appear grossly similar to 08/23/2023, allowing for differences in technique and scan coverage.  These may represent metastasis.  4. Numerous hepatic lesions, possibly metastasis.  New lesions suggested in the inferior right hepatic lobe.  5. Lobular soft tissue within the mesentery and anterior abdominal wall, possibly metastasis.  6. Additional details, as above.      Electronically signed by: Dario Johnson  Date:    11/16/2023  Time:    06:53               Narrative:    EXAMINATION:  CT ABDOMEN PELVIS WITHOUT CONTRAST    CLINICAL HISTORY:  Bowel obstruction suspected;    TECHNIQUE:  Low dose axial images, sagittal and coronal reformations were obtained from the lung bases to the pubic symphysis.    COMPARISON:  CT of the abdomen and pelvis performed 08/23/2023.    FINDINGS:  This examination is limited due to lack of intravenous contrast.    Lower chest: Moderate to large right pleural effusion, increased when compared to the 08/23/2023 CT.  Numerous solid pulmonary nodules felt grossly similar given differences in technique/scan coverage and anatomic distortion imposed by the enlarging right pleural effusion.  Chronic scarring in the region of the right middle lobe and lingula.    Liver: Normal contour.  Numerous hypodense lesions concerning for metastatic disease.  Approximately 23 mm hypodense lesion in the inferior right hepatic lobe (axial series 2, image 56 appears new since the 08/23/2023 CT.    Gallbladder and bile ducts: Unremarkable.    Pancreas: Normal contour.    Spleen: Normal contour.    Adrenals: Normal contour.    Kidneys: Grossly unchanged indeterminate 13 mm hyperattenuating lesion projecting at the anterior aspect of the upper pole left kidney (series 2, image 31).  No definite evidence of radiopaque urolithiasis or obstructive uropathy at the present time.    Lymph nodes: No definite new or  enlarging mesenteric and retroperitoneal lymph nodes.  Numerous prominent mesenteric and retroperitoneal lymph nodes are again noted.    Bowel and mesentery: Dilated loops of fluid-filled small bowel loops measure up to least 4 cm, as measured in the anterior mid abdomen (coronal reformat series 601, image 28).  A relative transition to decompressed small bowel suggested anterior lower abdomen, slightly to the left of midline (for example as seen on axial series 2, image 110), with relative proximity to remote operative sequela in the anterior abdominal wall.  Large volume colonic stool.  Appendix not confidently identified.  No definite focal pericecal inflammation is seen.    Suggested abnormal lobular soft tissue within the mesentery of the lower abdomen/pelvis (axial series 2, image 107; sagittal reformat series 602, image 73), measuring on the order of 1.9 x 1.5 x 1.6 cm.    Abdominal aorta: Nonaneurysmal.  Moderate to heavy atherosclerosis.    Inferior vena cava: Unremarkable.    Free fluid or free air: None.    Pelvis: Unremarkable.    Urinary bladder: Unremarkable.    Body wall: Remote operative sequela in the anterior abdominal wall.  There is enlarging soft tissue in the anterior abdominal wall deep to remote surgical incision (axial series 2, image 90), increasing in size when compared to prior examinations measuring on the order of 2.1 x 3.1 x 2.6 cm.    Bones: No acute change.  Query osseous demineralization.

## 2023-11-17 LAB
ANION GAP SERPL CALC-SCNC: 14 MMOL/L (ref 8–16)
BASOPHILS # BLD AUTO: 0.04 K/UL (ref 0–0.2)
BASOPHILS NFR BLD: 0.3 % (ref 0–1.9)
BUN SERPL-MCNC: 31 MG/DL (ref 8–23)
CALCIUM SERPL-MCNC: 9.3 MG/DL (ref 8.7–10.5)
CHLORIDE SERPL-SCNC: 103 MMOL/L (ref 95–110)
CO2 SERPL-SCNC: 26 MMOL/L (ref 23–29)
CREAT SERPL-MCNC: 0.9 MG/DL (ref 0.5–1.4)
DIFFERENTIAL METHOD: ABNORMAL
EOSINOPHIL # BLD AUTO: 0 K/UL (ref 0–0.5)
EOSINOPHIL NFR BLD: 0 % (ref 0–8)
ERYTHROCYTE [DISTWIDTH] IN BLOOD BY AUTOMATED COUNT: 16.6 % (ref 11.5–14.5)
EST. GFR  (NO RACE VARIABLE): >60 ML/MIN/1.73 M^2
GLUCOSE SERPL-MCNC: 82 MG/DL (ref 70–110)
HCT VFR BLD AUTO: 29.1 % (ref 37–48.5)
HCT VFR BLD AUTO: 29.6 % (ref 37–48.5)
HGB BLD-MCNC: 9.4 G/DL (ref 12–16)
HGB BLD-MCNC: 9.6 G/DL (ref 12–16)
IMM GRANULOCYTES # BLD AUTO: 0.08 K/UL (ref 0–0.04)
IMM GRANULOCYTES NFR BLD AUTO: 0.6 % (ref 0–0.5)
LYMPHOCYTES # BLD AUTO: 1 K/UL (ref 1–4.8)
LYMPHOCYTES NFR BLD: 6.9 % (ref 18–48)
MAGNESIUM SERPL-MCNC: 1.9 MG/DL (ref 1.6–2.6)
MCH RBC QN AUTO: 32.5 PG (ref 27–31)
MCHC RBC AUTO-ENTMCNC: 32.4 G/DL (ref 32–36)
MCV RBC AUTO: 100 FL (ref 82–98)
MONOCYTES # BLD AUTO: 1.1 K/UL (ref 0.3–1)
MONOCYTES NFR BLD: 7.6 % (ref 4–15)
NEUTROPHILS # BLD AUTO: 12 K/UL (ref 1.8–7.7)
NEUTROPHILS NFR BLD: 84.6 % (ref 38–73)
NRBC BLD-RTO: 0 /100 WBC
PHOSPHATE SERPL-MCNC: 3.4 MG/DL (ref 2.7–4.5)
PLATELET # BLD AUTO: 350 K/UL (ref 150–450)
PMV BLD AUTO: 8.8 FL (ref 9.2–12.9)
POTASSIUM SERPL-SCNC: 4.3 MMOL/L (ref 3.5–5.1)
RBC # BLD AUTO: 2.95 M/UL (ref 4–5.4)
SODIUM SERPL-SCNC: 143 MMOL/L (ref 136–145)
WBC # BLD AUTO: 14.19 K/UL (ref 3.9–12.7)

## 2023-11-17 PROCEDURE — 25000003 PHARM REV CODE 250

## 2023-11-17 PROCEDURE — 63600175 PHARM REV CODE 636 W HCPCS: Performed by: STUDENT IN AN ORGANIZED HEALTH CARE EDUCATION/TRAINING PROGRAM

## 2023-11-17 PROCEDURE — 99232 PR SUBSEQUENT HOSPITAL CARE,LEVL II: ICD-10-PCS | Mod: ,,, | Performed by: OBSTETRICS & GYNECOLOGY

## 2023-11-17 PROCEDURE — 80048 BASIC METABOLIC PNL TOTAL CA: CPT

## 2023-11-17 PROCEDURE — 36415 COLL VENOUS BLD VENIPUNCTURE: CPT | Performed by: OBSTETRICS & GYNECOLOGY

## 2023-11-17 PROCEDURE — 85025 COMPLETE CBC W/AUTO DIFF WBC: CPT | Performed by: OBSTETRICS & GYNECOLOGY

## 2023-11-17 PROCEDURE — 84100 ASSAY OF PHOSPHORUS: CPT

## 2023-11-17 PROCEDURE — 85018 HEMOGLOBIN: CPT | Performed by: OBSTETRICS & GYNECOLOGY

## 2023-11-17 PROCEDURE — 83735 ASSAY OF MAGNESIUM: CPT

## 2023-11-17 PROCEDURE — 85014 HEMATOCRIT: CPT | Performed by: OBSTETRICS & GYNECOLOGY

## 2023-11-17 PROCEDURE — 63600175 PHARM REV CODE 636 W HCPCS: Performed by: OBSTETRICS & GYNECOLOGY

## 2023-11-17 PROCEDURE — 36415 COLL VENOUS BLD VENIPUNCTURE: CPT

## 2023-11-17 PROCEDURE — 11000001 HC ACUTE MED/SURG PRIVATE ROOM

## 2023-11-17 PROCEDURE — 94761 N-INVAS EAR/PLS OXIMETRY MLT: CPT

## 2023-11-17 PROCEDURE — 99232 SBSQ HOSP IP/OBS MODERATE 35: CPT | Mod: ,,, | Performed by: OBSTETRICS & GYNECOLOGY

## 2023-11-17 PROCEDURE — 63600175 PHARM REV CODE 636 W HCPCS

## 2023-11-17 RX ORDER — HYDRALAZINE HYDROCHLORIDE 20 MG/ML
10 INJECTION INTRAMUSCULAR; INTRAVENOUS ONCE
Status: COMPLETED | OUTPATIENT
Start: 2023-11-17 | End: 2023-11-17

## 2023-11-17 RX ADMIN — SODIUM CHLORIDE, POTASSIUM CHLORIDE, SODIUM LACTATE AND CALCIUM CHLORIDE: 600; 310; 30; 20 INJECTION, SOLUTION INTRAVENOUS at 05:11

## 2023-11-17 RX ADMIN — SODIUM CHLORIDE, POTASSIUM CHLORIDE, SODIUM LACTATE AND CALCIUM CHLORIDE: 600; 310; 30; 20 INJECTION, SOLUTION INTRAVENOUS at 01:11

## 2023-11-17 RX ADMIN — PROCHLORPERAZINE EDISYLATE 2.5 MG: 5 INJECTION INTRAMUSCULAR; INTRAVENOUS at 12:11

## 2023-11-17 RX ADMIN — ACETAMINOPHEN 650 MG: 650 SUPPOSITORY RECTAL at 01:11

## 2023-11-17 RX ADMIN — PROCHLORPERAZINE EDISYLATE 2.5 MG: 5 INJECTION INTRAMUSCULAR; INTRAVENOUS at 05:11

## 2023-11-17 RX ADMIN — HYDRALAZINE HYDROCHLORIDE 10 MG: 20 INJECTION INTRAMUSCULAR; INTRAVENOUS at 03:11

## 2023-11-17 RX ADMIN — ONDANSETRON 8 MG: 2 INJECTION INTRAMUSCULAR; INTRAVENOUS at 01:11

## 2023-11-17 RX ADMIN — ONDANSETRON 8 MG: 2 INJECTION INTRAMUSCULAR; INTRAVENOUS at 06:11

## 2023-11-17 RX ADMIN — PROMETHAZINE HYDROCHLORIDE 12.5 MG: 12.5 SUPPOSITORY RECTAL at 09:11

## 2023-11-17 RX ADMIN — HYDRALAZINE HYDROCHLORIDE 10 MG: 20 INJECTION INTRAMUSCULAR; INTRAVENOUS at 05:11

## 2023-11-17 RX ADMIN — SODIUM CHLORIDE, POTASSIUM CHLORIDE, SODIUM LACTATE AND CALCIUM CHLORIDE: 600; 310; 30; 20 INJECTION, SOLUTION INTRAVENOUS at 09:11

## 2023-11-17 NOTE — SUBJECTIVE & OBJECTIVE
Interval History: DOMINICEON. Reports pain control improving with PRN dilaudid and rectal tylenol. She reports her nausea is also improving, denies any further emesis since yesterday AM. She reports she has had some belching, but no flatus or stool. She also endorses fatigue and weakness since prior to admission, but states she has been able to ambulate around her room without assistance.     Scheduled Meds:   dexAMETHasone  2 mg Intravenous Q12H    enoxparin  40 mg Subcutaneous Q24H (prophylaxis, 1700)    mupirocin   Nasal BID    ondansetron  8 mg Intravenous Q6H    prochlorperazine  2.5 mg Intravenous Q6H     Continuous Infusions:   lactated ringers 125 mL/hr at 11/17/23 0114     PRN Meds:acetaminophen, hydrALAZINE, HYDROmorphone, melatonin, prochlorperazine, promethazine, sodium chloride 0.9%    Review of patient's allergies indicates:   Allergen Reactions    Adhesive Dermatitis and Swelling     Skin adhesive (skinaffix)    Iodinated contrast media Other (See Comments), Diarrhea and Shortness Of Breath    Iodine Other (See Comments)     FAINTING, renal issues, Bilateral flank spasms  IODINE IN CONTRAST    Chamomile flower Hives    Codeine     Dyclonine Hives    Mold Other (See Comments)    Pcn [penicillins]     Pennyroyal oil Other (See Comments)    Peppermint Other (See Comments)    Diphenhydramine hcl Rash     Sometimes rash       Objective:     Vital Signs (Most Recent):  Temp: 97.8 °F (36.6 °C) (11/17/23 0510)  Pulse: 92 (11/17/23 0510)  Resp: 18 (11/17/23 0510)  BP: (!) 160/73 (11/17/23 0510)  SpO2: 95 % (11/17/23 0510) Vital Signs (24h Range):  Temp:  [97.8 °F (36.6 °C)-98.4 °F (36.9 °C)] 97.8 °F (36.6 °C)  Pulse:  [] 92  Resp:  [12-18] 18  SpO2:  [91 %-98 %] 95 %  BP: (144-196)/(65-84) 160/73     Weight: 51.3 kg (113 lb)  Body mass index is 18.24 kg/m².    Intake/Output - Last 3 Shifts         11/15 0700 11/16 0659 11/16 0700 11/17 0659    P.O.  0    I.V. (mL/kg)  2291.2 (44.7)    Total Intake(mL/kg)   2291.2 (44.7)    Urine (mL/kg/hr)  200 (0.2)    Total Output  200    Net  +2091.2          Urine Occurrence  0 x    Stool Occurrence  0 x    Emesis Occurrence  1 x                  Physical Exam:   Constitutional: She is oriented to person, place, and time. She appears well-developed and well-nourished.    HENT:   Head: Normocephalic and atraumatic.       Pulmonary/Chest: Effort normal.        Abdominal: Soft. She exhibits no distension. There is no abdominal tenderness. There is no rebound and no guarding.   Faint bowel sounds in upper quadrants, BS absent in lower quadrants.              Musculoskeletal: Normal range of motion.       Neurological: She is alert and oriented to person, place, and time.    Skin: Skin is warm and dry.    Psychiatric: She has a normal mood and affect. Her behavior is normal.          Lines/Drains/Airways       Central Venous Catheter Line  Duration             Port A Cath Single Lumen Atrial Right -- days              Peripheral Intravenous Line  Duration                  Peripheral IV - Single Lumen 11/16/23 0321 20 G Left Hand 1 day                    Laboratory:  BMP:   Recent Labs   Lab 11/16/23 0321 11/16/23  1503   * 111*    140   K 4.0 4.9    103   CO2 23 26   BUN 20 31*   CREATININE 1.2 1.1   CALCIUM 11.2* 10.0   MG 1.9  --     and CBC:   Recent Labs   Lab 11/16/23 0321   WBC 38.03*   HGB 13.5   HCT 41.6   *       Diagnostic Results:  No new results.

## 2023-11-17 NOTE — PROGRESS NOTES
[facility] ([address])  Gynecologic Oncology  Progress Note      Patient Name: Najma Hung  MRN: [id number]  Admission Date: 2023  Hospital Length of Stay: 1 days  Attending Provider: Jerrell Hodgson MD  Primary Care Provider: Neymar Cash III, MD  Principal Problem: Partial small bowel obstruction    Follow-up For: * No surgery found *  Post-Operative Day:    Subjective:      History of Present Illness:  Najma Hung is a 69 yo  with stage IIIB high grade serous ovarian cancer s/p 2 cycles of Gemcitabine/Carboplatin/Avastin who presents for severe abdominal pain with nausea and vomiting. The patient reports that she has been having burning like abdominal pain for 2 days that progressed to sharp pains. The pain is diffuse, constant and are aggravated by eating and moving. The patient was previously able to tolerate PO but has not been able to since the pain has worsened. At home, the patient also reports feeling feverish and having chills. She denies urinary symptoms, diarrhea, and endorses constipation. Of note, the patient was admitted - for partial small bowel obstruction.    In the ED, the patient was afebrile with other VSS. The patient received IV dilaudid X 2 and zofran which improved her symptoms.    Hospital Course:  2023- NAEON. Abdominal pain improved with PRN dilaudid and rectal tylenol. Nausea also improving, no further emesis since  AM. Reports belching, no gas or stool.     Interval History: NAEON. Reports pain control improving with PRN dilaudid and rectal tylenol. She reports her nausea is also improving, denies any further emesis since yesterday AM. She reports she has had some belching, but no flatus or stool. She also endorses fatigue and weakness since prior to admission, but states she has been able to ambulate around her room without assistance.     Scheduled Meds:   dexAMETHasone  2 mg Intravenous Q12H    enoxparin  40 mg Subcutaneous  Q24H (prophylaxis, 1700)    mupirocin   Nasal BID    ondansetron  8 mg Intravenous Q6H    prochlorperazine  2.5 mg Intravenous Q6H     Continuous Infusions:   lactated ringers 125 mL/hr at 11/17/23 0114     PRN Meds:acetaminophen, hydrALAZINE, HYDROmorphone, melatonin, prochlorperazine, promethazine, sodium chloride 0.9%    Review of patient's allergies indicates:   Allergen Reactions    Adhesive Dermatitis and Swelling     Skin adhesive (skinaffix)    Iodinated contrast media Other (See Comments), Diarrhea and Shortness Of Breath    Iodine Other (See Comments)     FAINTING, renal issues, Bilateral flank spasms  IODINE IN CONTRAST    Chamomile flower Hives    Codeine     Dyclonine Hives    Mold Other (See Comments)    Pcn [penicillins]     Pennyroyal oil Other (See Comments)    Peppermint Other (See Comments)    Diphenhydramine hcl Rash     Sometimes rash       Objective:     Vital Signs (Most Recent):  Temp: 97.8 °F (36.6 °C) (11/17/23 0510)  Pulse: 92 (11/17/23 0510)  Resp: 18 (11/17/23 0510)  BP: (!) 160/73 (11/17/23 0510)  SpO2: 95 % (11/17/23 0510) Vital Signs (24h Range):  Temp:  [97.8 °F (36.6 °C)-98.4 °F (36.9 °C)] 97.8 °F (36.6 °C)  Pulse:  [] 92  Resp:  [12-18] 18  SpO2:  [91 %-98 %] 95 %  BP: (144-196)/(65-84) 160/73     Weight: 51.3 kg (113 lb)  Body mass index is 18.24 kg/m².    Intake/Output - Last 3 Shifts         11/15 0700  11/16 0659 11/16 0700 11/17 0659    P.O.  0    I.V. (mL/kg)  2291.2 (44.7)    Total Intake(mL/kg)  2291.2 (44.7)    Urine (mL/kg/hr)  200 (0.2)    Total Output  200    Net  +2091.2          Urine Occurrence  0 x    Stool Occurrence  0 x    Emesis Occurrence  1 x                  Physical Exam:   Constitutional: She is oriented to person, place, and time. She appears well-developed and well-nourished.    HENT:   Head: Normocephalic and atraumatic.       Pulmonary/Chest: Effort normal.        Abdominal: Soft. She exhibits no distension. There is no abdominal tenderness.  There is no rebound and no guarding.   Faint bowel sounds in upper quadrants, BS absent in lower quadrants.              Musculoskeletal: Normal range of motion.       Neurological: She is alert and oriented to person, place, and time.    Skin: Skin is warm and dry.    Psychiatric: She has a normal mood and affect. Her behavior is normal.          Lines/Drains/Airways       Central Venous Catheter Line  Duration             Port A Cath Single Lumen Atrial Right -- days              Peripheral Intravenous Line  Duration                  Peripheral IV - Single Lumen 11/16/23 0321 20 G Left Hand 1 day                    Laboratory:  BMP:   Recent Labs   Lab 11/16/23 0321 11/16/23  1503   * 111*    140   K 4.0 4.9    103   CO2 23 26   BUN 20 31*   CREATININE 1.2 1.1   CALCIUM 11.2* 10.0   MG 1.9  --     and CBC:   Recent Labs   Lab 11/16/23 0321   WBC 38.03*   HGB 13.5   HCT 41.6   *       Diagnostic Results:  No new results.   Assessment/Plan:     * Partial small bowel obstruction  -VSS   -CBC stable, WBC 38, Plts 469 likely due to neulasta treatment, AM CBC pending   -CMP: K 4.9 Cr 1.1, Mag 1.9, Phos 5.2   -CT: Findings in keeping with acute small bowel obstruction, with a suspected transition to decompressed distal small bowel in the anterior lower abdomen, slightly to the left of midline, with relative proximity to remote operative sequela in the anterior abdominal wall. Enlarging of known right pleural effusion, which may be malignant. Lobular soft tissue within the mesentery and anterior abdominal wall, possibly metastasis.   -Antiemetics: scheduled Zofran IV and Compazine IV, PRN rectal phenergan  - Patient declines NGT placement at this time. Reports traumatic experience with NGT during last admission.   -Diet: NPO  -   -Pain control: Dilaudid PRN, rectal tylenol   -Daily CMP/Mg/Phos: Will replace electrolytes PRN with goal K >4.0, Mg > 2,  Phos >3    Encounter for palliative  care  - Palliative care consulted, appreciate recommendations and assistance     Malignant neoplasm of both ovaries  -See Gyn Oncology history above  -Cycle 3 of gem/carbo/avastin was scheduled to start tomorrow. Will likely be delayed given acute SBO and hospitalization.  -Lovenox daily for VTE prophylaxis     Hypertension  -BP: (144-196)/(65-84) 160/73  -no home medication  -Hydralazine PRN ordered      Chronic kidney disease (CKD)  - Cr on admit 1.2, previously baseline Cr 0.8.   - Withholding nephrotoxic agents    Ovarian cancer  -Stage IIIB high grade serous ovarian cancer   -See GYN ONC history      VTE Risk Mitigation (From admission, onward)           Ordered     enoxaparin injection 40 mg  Every 24 hours         11/16/23 0914     IP VTE HIGH RISK PATIENT  Once         11/16/23 0541     Place sequential compression device  Until discontinued         11/16/23 0541                    Was fo    Rufina Beasley MD  Gynecologic Oncology  [facility] ([address])

## 2023-11-17 NOTE — PROGRESS NOTES
"Met with patient and  at bedside. Patient reports she is feeling better since yesterday and will try to increase her activity level today to help with BM.  Emotional support given. Business card given for any questions/concerns. She is thinking about seeing us in palliative care clinic, but admits that "one more appointment" is exhausting. Agree with this, offered virtual visit and she will think about it. Will continue to follow along and support.   "

## 2023-11-17 NOTE — PLAN OF CARE
Problem: Adult Inpatient Plan of Care  Goal: Plan of Care Review  Outcome: Ongoing, Progressing  Goal: Absence of Hospital-Acquired Illness or Injury  Outcome: Ongoing, Progressing  Goal: Optimal Comfort and Wellbeing  Outcome: Ongoing, Progressing     Problem: Coping Ineffective  Goal: Effective Coping  Outcome: Ongoing, Progressing     Problem: Infection  Goal: Absence of Infection Signs and Symptoms  Outcome: Ongoing, Progressing     Problem: Skin Injury Risk Increased  Goal: Skin Health and Integrity  Outcome: Ongoing, Progressing     Problem: Nausea and Vomiting  Goal: Fluid and Electrolyte Balance  Outcome: Ongoing, Progressing   POC reviewed with pt who verbalized understanding. AAOx4. VSS. Remains free of falls and injury. IVF infusing. Pain and nausea treated with PRN meds. NPO. Up independently to bedside commode. Resting between care.  at bedside. Call light in reach and rounds made for safety.

## 2023-11-17 NOTE — HOSPITAL COURSE
11/17/2023- NAEON. Abdominal pain improved with PRN dilaudid and rectal tylenol. Nausea also improving, no further emesis since 11/16 AM. Reports belching, no gas or stool.   11/18/2023- NAEON. S/p hydral x2 for elevated BP, asymptomatic. No N/V since admission. Passing gas, no stool. Transition from NPO to CLD today.  11/19/2023- NAEON. BP elevated although no hydralazine required yesterday/overnight. Tolerated CLD>full liquids without N/V. Passing flatus and s/p 1 BM. Transition to bland diet today, anticipate discharge this afternoon.    Pt tolerated both breakfast and lunch without N/V. Stable for discharge home.

## 2023-11-17 NOTE — ASSESSMENT & PLAN NOTE
-See Gyn Oncology history above  -Cycle 3 of gem/carbo/avastin was scheduled to start tomorrow. Will likely be delayed given acute SBO and hospitalization.  -Lovenox daily for VTE prophylaxis

## 2023-11-18 ENCOUNTER — PATIENT MESSAGE (OUTPATIENT)
Dept: GYNECOLOGIC ONCOLOGY | Facility: CLINIC | Age: 68
End: 2023-11-18
Payer: MEDICARE

## 2023-11-18 PROCEDURE — 63600175 PHARM REV CODE 636 W HCPCS

## 2023-11-18 PROCEDURE — 99223 PR INITIAL HOSPITAL CARE,LEVL III: ICD-10-PCS | Mod: ,,, | Performed by: OBSTETRICS & GYNECOLOGY

## 2023-11-18 PROCEDURE — 99223 1ST HOSP IP/OBS HIGH 75: CPT | Mod: ,,, | Performed by: OBSTETRICS & GYNECOLOGY

## 2023-11-18 PROCEDURE — 63600175 PHARM REV CODE 636 W HCPCS: Performed by: STUDENT IN AN ORGANIZED HEALTH CARE EDUCATION/TRAINING PROGRAM

## 2023-11-18 PROCEDURE — 94761 N-INVAS EAR/PLS OXIMETRY MLT: CPT

## 2023-11-18 PROCEDURE — 11000001 HC ACUTE MED/SURG PRIVATE ROOM

## 2023-11-18 RX ORDER — DEXAMETHASONE SODIUM PHOSPHATE 4 MG/ML
1 INJECTION, SOLUTION INTRA-ARTICULAR; INTRALESIONAL; INTRAMUSCULAR; INTRAVENOUS; SOFT TISSUE EVERY 12 HOURS
Status: DISCONTINUED | OUTPATIENT
Start: 2023-11-18 | End: 2023-11-19 | Stop reason: HOSPADM

## 2023-11-18 RX ORDER — ALPRAZOLAM 0.25 MG/1
0.5 TABLET ORAL NIGHTLY PRN
Status: DISCONTINUED | OUTPATIENT
Start: 2023-11-18 | End: 2023-11-19 | Stop reason: HOSPADM

## 2023-11-18 RX ADMIN — PROCHLORPERAZINE EDISYLATE 2.5 MG: 5 INJECTION INTRAMUSCULAR; INTRAVENOUS at 01:11

## 2023-11-18 RX ADMIN — PROCHLORPERAZINE EDISYLATE 2.5 MG: 5 INJECTION INTRAMUSCULAR; INTRAVENOUS at 06:11

## 2023-11-18 RX ADMIN — SODIUM CHLORIDE, POTASSIUM CHLORIDE, SODIUM LACTATE AND CALCIUM CHLORIDE: 600; 310; 30; 20 INJECTION, SOLUTION INTRAVENOUS at 06:11

## 2023-11-18 RX ADMIN — PROCHLORPERAZINE EDISYLATE 5 MG: 5 INJECTION INTRAMUSCULAR; INTRAVENOUS at 06:11

## 2023-11-18 RX ADMIN — HYDRALAZINE HYDROCHLORIDE 10 MG: 20 INJECTION INTRAMUSCULAR; INTRAVENOUS at 12:11

## 2023-11-18 RX ADMIN — PROCHLORPERAZINE EDISYLATE 2.5 MG: 5 INJECTION INTRAMUSCULAR; INTRAVENOUS at 12:11

## 2023-11-18 NOTE — SUBJECTIVE & OBJECTIVE
Interval History: MEJIA. Reports pain control improved with rectal tylenol. She reports her nausea is also improving, denies any further emesis since morning of admission. She reports she has had some belching and flatus but no stool. She also endorses fatigue and weakness since prior to admission, but states she has been able to ambulate around her room without assistance. Reports increased anxiety.    Scheduled Meds:   dexAMETHasone  2 mg Intravenous Q12H    enoxparin  40 mg Subcutaneous Q24H (prophylaxis, 1700)    mupirocin   Nasal BID    ondansetron  8 mg Intravenous Q6H    prochlorperazine  2.5 mg Intravenous Q6H     Continuous Infusions:   lactated ringers 125 mL/hr at 11/17/23 1745     PRN Meds:acetaminophen, hydrALAZINE, HYDROmorphone, melatonin, prochlorperazine, promethazine, sodium chloride 0.9%    Review of patient's allergies indicates:   Allergen Reactions    Adhesive Dermatitis and Swelling     Skin adhesive (skinaffix)    Iodinated contrast media Other (See Comments), Diarrhea and Shortness Of Breath    Iodine Other (See Comments)     FAINTING, renal issues, Bilateral flank spasms  IODINE IN CONTRAST    Chamomile flower Hives    Codeine     Dyclonine Hives    Mold Other (See Comments)    Pcn [penicillins]     Pennyroyal oil Other (See Comments)    Peppermint Other (See Comments)    Diphenhydramine hcl Rash     Sometimes rash       Objective:     Vital Signs (Most Recent):  Temp: 98.3 °F (36.8 °C) (11/18/23 0809)  Pulse: (!) 119 (11/18/23 0809)  Resp: 16 (11/18/23 0809)  BP: (!) 178/79 (11/18/23 0809)  SpO2: 95 % (11/18/23 0809) Vital Signs (24h Range):  Temp:  [98.3 °F (36.8 °C)-99 °F (37.2 °C)] 98.3 °F (36.8 °C)  Pulse:  [] 119  Resp:  [16-20] 16  SpO2:  [94 %-95 %] 95 %  BP: (161-200)/(72-88) 178/79     Weight: 51.3 kg (113 lb)  Body mass index is 18.24 kg/m².    Intake/Output - Last 3 Shifts         11/16 0700 11/17 0659 11/17 0700 11/18 0659 11/18 0700 11/19 0659    P.O. 0      I.V.  (mL/kg) 2291.2 (44.7)      Total Intake(mL/kg) 2291.2 (44.7)      Urine (mL/kg/hr) 200 (0.2)      Total Output 200      Net +2091.2             Urine Occurrence 0 x      Stool Occurrence 0 x      Emesis Occurrence 1 x                    Physical Exam:   Constitutional: She is oriented to person, place, and time. She appears well-developed and well-nourished.    HENT:   Head: Normocephalic and atraumatic.       Pulmonary/Chest: Effort normal.        Abdominal: Soft. She exhibits no distension. There is abdominal tenderness. There is no rebound and no guarding.   Minimal TTP             Musculoskeletal: Normal range of motion.       Neurological: She is alert and oriented to person, place, and time.    Skin: Skin is warm and dry.    Psychiatric: She has a normal mood and affect. Her behavior is normal.          Lines/Drains/Airways       Central Venous Catheter Line  Duration             Port A Cath Single Lumen Atrial Right -- days              Peripheral Intravenous Line  Duration                  Peripheral IV - Single Lumen 11/16/23 0321 20 G Left Hand 2 days                    Laboratory:  BMP:   Recent Labs   Lab 11/16/23  1503 11/17/23  0524   * 82    143   K 4.9 4.3    103   CO2 26 26   BUN 31* 31*   CREATININE 1.1 0.9   CALCIUM 10.0 9.3   MG  --  1.9      and CBC:   Recent Labs   Lab 11/17/23  0645 11/17/23  0750   WBC 14.19*  --    HGB 9.6* 9.4*   HCT 29.6* 29.1*     --          Diagnostic Results:  No new results.

## 2023-11-18 NOTE — ASSESSMENT & PLAN NOTE
-See Gyn Oncology history above  -Cycle 3 of gem/carbo/avastin was scheduled 11/17. Will likely be delayed given acute SBO and hospitalization.  -Lovenox daily for VTE prophylaxis

## 2023-11-18 NOTE — NURSING
Pt. AAOX4. Pt BP elevated during the night. IV medication given for intervention. Pt tolerated medication well.  Pt remain of injury and falls. No complaints of nausea/vomiting or pain. Continuous fluids infusing. Pt up with assistance to the bedside commode.Pt's  is at bedside. Safety precautions in place, Call light in reach. No questions at this time.

## 2023-11-18 NOTE — PROGRESS NOTES
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 The patient reports that she has been having burning like abdominal pain for 2 days that progressed to sharp pains. The pain is diffuse, constant and are aggravated by eating and moving. The patient was previously able to tolerate PO but has not been able to since the pain has worsened. At home, the patient also reports feeling feverish and having chills. She denies urinary symptoms, diarrhea, and endorses constipation. Of note, the patient was admitted - for partial small bowel obstruction.    In the ED, the patient was afebrile with other VSS. The patient received IV dilaudid X 2 and zofran which improved her symptoms.    Hospital Course:  2023- NAEON. Abdominal pain improved with PRN dilaudid and rectal tylenol. Nausea also improving, no further emesis since 11/16 AM. Reports belching, no gas or stool.   2023- NAEON. S/p hydral x2 for elevated BP, asymptomatic. No N/V since admission. Passing gas, no stool. Transition from NPO to CLD today.    Interval History: NAEON. Reports pain control improved with rectal tylenol. She reports her nausea is also improving, denies any further emesis since morning of admission. She reports she has had some belching and flatus but no stool. She also endorses fatigue and weakness since prior to admission, but states she has been able to  ambulate around her room without assistance. Reports increased anxiety.    Scheduled Meds:   dexAMETHasone  2 mg Intravenous Q12H    enoxparin  40 mg Subcutaneous Q24H (prophylaxis, 1700)    mupirocin   Nasal BID    ondansetron  8 mg Intravenous Q6H    prochlorperazine  2.5 mg Intravenous Q6H     Continuous Infusions:   lactated ringers 125 mL/hr at 11/17/23 1745     PRN Meds:acetaminophen, hydrALAZINE, HYDROmorphone, melatonin, prochlorperazine, promethazine, sodium chloride 0.9%    Review of patient's allergies indicates:   Allergen Reactions    Adhesive Dermatitis and Swelling     Skin adhesive (skinaffix)    Iodinated contrast media Other (See Comments), Diarrhea and Shortness Of Breath    Iodine Other (See Comments)     FAINTING, renal issues, Bilateral flank spasms  IODINE IN CONTRAST    Chamomile flower Hives    Codeine     Dyclonine Hives    Mold Other (See Comments)    Pcn [penicillins]     Pennyroyal oil Other (See Comments)    Peppermint Other (See Comments)    Diphenhydramine hcl Rash     Sometimes rash       Objective:     Vital Signs (Most Recent):  Temp: 98.3 °F (36.8 °C) (11/18/23 0809)  Pulse: (!) 119 (11/18/23 0809)  Resp: 16 (11/18/23 0809)  BP: (!) 178/79 (11/18/23 0809)  SpO2: 95 % (11/18/23 0809) Vital Signs (24h Range):  Temp:  [98.3 °F (36.8 °C)-99 °F (37.2 °C)] 98.3 °F (36.8 °C)  Pulse:  [] 119  Resp:  [16-20] 16  SpO2:  [94 %-95 %] 95 %  BP: (161-200)/(72-88) 178/79     Weight: 51.3 kg (113 lb)  Body mass index is 18.24 kg/m².    Intake/Output - Last 3 Shifts         11/16 0700  11/17 0659 11/17 0700 11/18 0659 11/18 0700 11/19 0659    P.O. 0      I.V. (mL/kg) 2291.2 (44.7)      Total Intake(mL/kg) 2291.2 (44.7)      Urine (mL/kg/hr) 200 (0.2)      Total Output 200      Net +2091.2             Urine Occurrence 0 x      Stool Occurrence 0 x      Emesis Occurrence 1 x                   Physical Exam:   Constitutional: She is oriented to person, place, and time. She appears  well-developed and well-nourished.    HENT:   Head: Normocephalic and atraumatic.       Pulmonary/Chest: Effort normal.        Abdominal: Soft. She exhibits no distension. There is abdominal tenderness. There is no rebound and no guarding.   Minimal TTP             Musculoskeletal: Normal range of motion.       Neurological: She is alert and oriented to person, place, and time.    Skin: Skin is warm and dry.    Psychiatric: She has a normal mood and affect. Her behavior is normal.          Lines/Drains/Airways       Central Venous Catheter Line  Duration             Port A Cath Single Lumen Atrial Right -- days              Peripheral Intravenous Line  Duration                  Peripheral IV - Single Lumen 11/16/23 0321 20 G Left Hand 2 days                    Laboratory:  BMP:   Recent Labs   Lab 11/16/23  1503 11/17/23  0524   * 82    143   K 4.9 4.3    103   CO2 26 26   BUN 31* 31*   CREATININE 1.1 0.9   CALCIUM 10.0 9.3   MG  --  1.9      and CBC:   Recent Labs   Lab 11/17/23  0645 11/17/23  0750   WBC 14.19*  --    HGB 9.6* 9.4*   HCT 29.6* 29.1*     --          Diagnostic Results:  No new results.   Assessment/Plan:     * Partial small bowel obstruction  -VSS   -CBC stable, WBC 38, Plts 469 likely due to neulasta treatment  -CMP: K 4.9 Cr 1.1, Mag 1.9, Phos 5.2   -CT: Findings in keeping with acute small bowel obstruction, with a suspected transition to decompressed distal small bowel in the anterior lower abdomen, slightly to the left of midline, with relative proximity to remote operative sequela in the anterior abdominal wall. Enlarging of known right pleural effusion, which may be malignant. Lobular soft tissue within the mesentery and anterior abdominal wall, possibly metastasis.   -Antiemetics: scheduled Zofran IV and Compazine IV, PRN rectal phenergan  - Patient declined NG at time of admission. Reports traumatic experience with NGT during last admission.   -Diet:  NPO>Transition to Clear liquid diet today  -IVF @ 40   -Pain control: Dilaudid PRN (not using), rectal tylenol   -Daily CMP/Mg/Phos: Replace electrolytes PRN with goal K >4.0, Mg > 2,  Phos >3    Encounter for palliative care  - Palliative care consulted, appreciate recommendations and assistance     Malignant neoplasm of both ovaries  -See Gyn Oncology history above  -Cycle 3 of gem/carbo/avastin was scheduled 11/17. Will likely be delayed given acute SBO and hospitalization.  -Lovenox daily for VTE prophylaxis     Hypertension  BP: (161-200)/(72-88) 178/79  -no home medication  -Hydralazine PRN ordered      Chronic kidney disease (CKD)  - Cr on admit 1.2, previously baseline Cr 0.8.   - Withholding nephrotoxic agents    Ovarian cancer  -Recurrent platinum resistant high grade serous ovarian cancer  -See GYN ONC history      VTE Risk Mitigation (From admission, onward)           Ordered     enoxaparin injection 40 mg  Every 24 hours         11/16/23 0914     IP VTE HIGH RISK PATIENT  Once         11/16/23 0541     Place sequential compression device  Until discontinued         11/16/23 0541                    Eliza Morse MD  Gynecologic Oncology  [facility] ([address])

## 2023-11-19 VITALS
WEIGHT: 113 LBS | SYSTOLIC BLOOD PRESSURE: 176 MMHG | RESPIRATION RATE: 18 BRPM | DIASTOLIC BLOOD PRESSURE: 85 MMHG | TEMPERATURE: 98 F | BODY MASS INDEX: 18.16 KG/M2 | HEIGHT: 66 IN | OXYGEN SATURATION: 95 % | HEART RATE: 85 BPM

## 2023-11-19 PROBLEM — K56.600 PARTIAL SMALL BOWEL OBSTRUCTION: Status: RESOLVED | Noted: 2023-08-23 | Resolved: 2023-11-19

## 2023-11-19 PROCEDURE — 99239 HOSP IP/OBS DSCHRG MGMT >30: CPT | Mod: ,,, | Performed by: OBSTETRICS & GYNECOLOGY

## 2023-11-19 PROCEDURE — 63600175 PHARM REV CODE 636 W HCPCS

## 2023-11-19 PROCEDURE — 99239 PR HOSPITAL DISCHARGE DAY,>30 MIN: ICD-10-PCS | Mod: ,,, | Performed by: OBSTETRICS & GYNECOLOGY

## 2023-11-19 RX ORDER — ALPRAZOLAM 0.5 MG/1
0.5 TABLET ORAL NIGHTLY PRN
Qty: 15 TABLET | Refills: 0 | Status: SHIPPED | OUTPATIENT
Start: 2023-11-19 | End: 2023-12-19

## 2023-11-19 RX ORDER — PROMETHAZINE HYDROCHLORIDE 12.5 MG/1
12.5 SUPPOSITORY RECTAL EVERY 6 HOURS PRN
Qty: 30 SUPPOSITORY | Refills: 0 | Status: SHIPPED | OUTPATIENT
Start: 2023-11-19 | End: 2023-11-21 | Stop reason: SDUPTHER

## 2023-11-19 RX ADMIN — DEXAMETHASONE SODIUM PHOSPHATE 1 MG: 4 INJECTION INTRA-ARTICULAR; INTRALESIONAL; INTRAMUSCULAR; INTRAVENOUS; SOFT TISSUE at 08:11

## 2023-11-19 RX ADMIN — PROCHLORPERAZINE EDISYLATE 2.5 MG: 5 INJECTION INTRAMUSCULAR; INTRAVENOUS at 12:11

## 2023-11-19 NOTE — NURSING
Pt. AAOX4. Pt BP elevated during the night. MD aware of BP. Pt remain of injury and falls. No complaints of nausea/vomiting or pain. Continuous fluids infusing. Pt up with assistance to the bedside commode.Pt's  is at bedside. Safety precautions in place, Call light in reach. No questions at this time.

## 2023-11-19 NOTE — ASSESSMENT & PLAN NOTE
-VSS   -CBC stable, WBC 38, Plts 469 likely due to neulasta treatment  -CMP: K 4.9 Cr 1.1>0.9, Mag 1.9, Phos 5.2   -CT: Findings in keeping with acute small bowel obstruction, with a suspected transition to decompressed distal small bowel in the anterior lower abdomen, slightly to the left of midline, with relative proximity to remote operative sequela in the anterior abdominal wall. Enlarging of known right pleural effusion, which may be malignant. Lobular soft tissue within the mesentery and anterior abdominal wall, possibly metastasis.   -Antiemetics: scheduled Zofran IV and Compazine IV, PRN rectal phenergan  - Patient declined NG at time of admission. Reports traumatic experience with NGT during last admission.   - Now s/p BM and passing flatus on HD#3-4  -Diet: Clear liquids>full liquids, transition to bland diet today  --Anticipate discharge this afternoon  -s/p IVF  -Pain control: Dilaudid PRN (not using), rectal tylenol   -Daily CMP/Mg/Phos: Replace electrolytes PRN with goal K >4.0, Mg > 2,  Phos >3

## 2023-11-19 NOTE — SUBJECTIVE & OBJECTIVE
Interval History: NAEON. Denies abdominal pain. Tolerated clear liquid and full liquid diets without N/V. Passing flatus and s/p one BM. Ambulating around her room without assistance. Voiding spontaneously.    Scheduled Meds:   dexAMETHasone  1 mg Intravenous Q12H    enoxparin  40 mg Subcutaneous Q24H (prophylaxis, 1700)    mupirocin   Nasal BID    ondansetron  8 mg Intravenous Q6H    prochlorperazine  2.5 mg Intravenous Q6H     Continuous Infusions:      PRN Meds:acetaminophen, ALPRAZolam, hydrALAZINE, melatonin, prochlorperazine, promethazine, sodium chloride 0.9%    Review of patient's allergies indicates:   Allergen Reactions    Adhesive Dermatitis and Swelling     Skin adhesive (skinaffix)    Iodinated contrast media Other (See Comments), Diarrhea and Shortness Of Breath    Iodine Other (See Comments)     FAINTING, renal issues, Bilateral flank spasms  IODINE IN CONTRAST    Chamomile flower Hives    Codeine     Dyclonine Hives    Mold Other (See Comments)    Pcn [penicillins]     Pennyroyal oil Other (See Comments)    Peppermint Other (See Comments)    Diphenhydramine hcl Rash     Sometimes rash       Objective:     Vital Signs (Most Recent):  Temp: 98.3 °F (36.8 °C) (11/19/23 0418)  Pulse: 93 (11/19/23 0418)  Resp: 20 (11/18/23 2252)  BP: (!) 175/80 (11/19/23 0418)  SpO2: 95 % (11/19/23 0418) Vital Signs (24h Range):  Temp:  [98.3 °F (36.8 °C)-98.8 °F (37.1 °C)] 98.3 °F (36.8 °C)  Pulse:  [] 93  Resp:  [16-20] 20  SpO2:  [95 %-98 %] 95 %  BP: (171-194)/(74-86) 175/80     Weight: 51.3 kg (113 lb)  Body mass index is 18.24 kg/m².    Intake/Output - Last 3 Shifts         11/17 0700  11/18 0659 11/18 0700  11/19 0659 11/19 0700  11/20 0659    P.O.  480     I.V. (mL/kg)       Total Intake(mL/kg)  480 (9.4)     Urine (mL/kg/hr)  300 (0.2)     Total Output  300     Net  +180            Urine Occurrence  2 x     Stool Occurrence  1 x                   Physical Exam:   Constitutional: She is oriented to person,  "place, and time. She appears well-developed and well-nourished.    HENT:   Head: Normocephalic and atraumatic.       Pulmonary/Chest: Effort normal.        Abdominal: Soft. She exhibits no distension. There is abdominal tenderness. There is no rebound and no guarding.   Minimal TTP             Musculoskeletal: Normal range of motion.       Neurological: She is alert and oriented to person, place, and time.    Skin: Skin is warm and dry.    Psychiatric: She has a normal mood and affect. Her behavior is normal.          Lines/Drains/Airways       Central Venous Catheter Line  Duration             Port A Cath Single Lumen Atrial Right -- days              Peripheral Intravenous Line  Duration                  Peripheral IV - Single Lumen 11/16/23 0321 20 G Left Hand 3 days                    Laboratory:  BMP:   No results for input(s): "GLU", "NA", "K", "CL", "CO2", "BUN", "CREATININE", "CALCIUM", "MG" in the last 48 hours.   and CBC:   Recent Labs   Lab 11/17/23  0750   HGB 9.4*   HCT 29.1*         Diagnostic Results:  No new results.   "

## 2023-11-19 NOTE — NURSING
/85, attempted to reassess, patient refuses. Patient denies dizziness, chest pain and is in no apparent distress. Dr. Morse made aware.

## 2023-11-19 NOTE — PLAN OF CARE
Problem: Adult Inpatient Plan of Care  Goal: Plan of Care Review  Outcome: Ongoing, Progressing  Goal: Patient-Specific Goal (Individualized)  Outcome: Ongoing, Progressing  Goal: Absence of Hospital-Acquired Illness or Injury  Outcome: Ongoing, Progressing  Goal: Optimal Comfort and Wellbeing  Outcome: Ongoing, Progressing  Intervention: Provide Person-Centered Care  Flowsheets (Taken 11/18/2023 8352)  Trust Relationship/Rapport:   care explained   choices provided   emotional support provided   questions encouraged   questions answered   empathic listening provided   thoughts/feelings acknowledged    No acute events this shift. Pt LR decreased to 40ml/ hr per MD order. Pt dt advanced throughout the day from NPO to clear liquid to full liquid. Tolerating well. Pt walked around the unit successfully with spouse. 1 BM noted. Shower taken. Pain 0-1/10 throughout shift. BP remains elevated however parameters not met this shift for hydralazine. NAD noted.  Safety maintained throughout shift,  at the bedside, supportive in care.

## 2023-11-19 NOTE — ASSESSMENT & PLAN NOTE
- Cr on admit 1.2, previously baseline Cr 0.8.   - Cr as of 11/17 back to baseline, Cr 0.9  - Withholding nephrotoxic agents

## 2023-11-19 NOTE — DISCHARGE SUMMARY
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 The patient reports that she has been having burning like abdominal pain for 2 days that progressed to sharp pains. The pain is diffuse, constant and are aggravated by eating and moving. The patient was previously able to tolerate PO but has not been able to since the pain has worsened. At home, the patient also reports feeling feverish and having chills. She denies urinary symptoms, diarrhea, and endorses constipation. Of note, the patient was admitted - for partial small bowel obstruction.    In the ED, the patient was afebrile with other VSS. The patient received IV dilaudid X 2 and zofran which improved her symptoms.    Hospital Course:  2023- NAEON. Abdominal pain improved with PRN dilaudid and rectal tylenol. Nausea also improving, no further emesis since 1116 AM. Reports belching, no gas or stool.   2023- NAEON. S/p hydral x2 for elevated BP, asymptomatic. No N/V since admission. Passing gas, no stool. Transition from NPO to CLD today.  2023- NAEON. BP elevated although no hydralazine required yesterday/overnight. Tolerated CLD>full liquids without N/V. Passing flatus and s/p 1 BM. Transition to bland diet today, anticipate discharge this afternoon.    Pt tolerated both breakfast and lunch without N/V. Stable for discharge home.     Goals of Care Treatment Preferences:  Code Status: Full Code    Health care agent: Cody  [facility] agent number: No value filed.    Living Will: Yes     What is most important right now is to focus on spending time at home, remaining as independent as possible.  Accordingly, we have decided that the best plan to meet the patient's goals includes continuing with treatment.      * No surgery found *     Consults (From admission, onward)          Status Ordering Provider     Inpatient consult to Palliative Care  Once        Provider:  Cassy Gomez, ELLEN    Completed PIETER MARSHALL            Significant Diagnostic Studies: N/A    Pending Diagnostic Studies:       None          Final Active Diagnoses:    Diagnosis Date Noted POA    Encounter for palliative care [Z51.5] 11/16/2023 Not Applicable    Malignant neoplasm of both ovaries [C56.3] 09/07/2023 Yes    Hypertension [I10] 07/22/2023 Yes    Ovarian cancer [C56.9] 07/21/2023 Yes    Chronic kidney disease (CKD) [N18.9] 07/21/2023 Yes      Problems Resolved During this Admission:    Diagnosis Date Noted Date Resolved POA    PRINCIPAL PROBLEM:  Partial small bowel obstruction [K56.600] 08/23/2023 11/19/2023 Yes            Discharged Condition: good    Disposition: Home or Self Care    Follow Up:   Follow-up Information       Jerrell Hodgson MD. Call in 1 day(s).    Specialties: Gynecologic Oncology, Gynecology, Oncology  Contact information:  [address] [place] [id number] [phone]                           Patient Instructions:      Diet Adult Regular     Other restrictions (specify):   Order Comments: Notify MD if bleeding 1 pad/hour for 2 consecutive hours.     No driving until:   Order Comments: Do not drive while taking narcotics.     No dressing needed     Notify your health care provider if you experience any of the following:  temperature >100.4     Notify your health care provider if you experience any of the following:  persistent nausea and vomiting or diarrhea     Notify your health care provider if you  experience any of the following:  severe uncontrolled pain     Notify your health care provider if you experience any of the following:  difficulty breathing or increased cough     Notify your health care provider if you experience any of the following:  severe persistent headache     Notify your health care provider if you experience any of the following:  persistent dizziness, light-headedness, or visual disturbances     Notify your health care provider if you experience any of the following:  increased confusion or weakness     Notify your health care provider if you experience any of the following:   Order Comments: Notify MD if bleeding 1 pad/hour for 2 consecutive hours.     Activity as tolerated     Medications:  Reconciled Home Medications:      Medication List        START taking these medications      ALPRAZolam 0.5 MG tablet  Commonly known as: XANAX  Take 1 tablet (0.5 mg total) by mouth nightly as needed for Anxiety.            CHANGE how you take these medications      * promethazine 12.5 MG Tab  Commonly known as: PHENERGAN  Take 1 tablet (12.5 mg total) by mouth every 6 (six) hours as needed (Nausea/vomiting).  What changed: Another medication with the same name was added. Make sure you understand how and when to take each.     * promethazine 12.5 MG Supp  Commonly known as: PHENERGAN  Place 1 suppository (12.5 mg total) rectally every 6 (six) hours as needed.  What changed: You were already taking a medication with the same name, and this prescription was added. Make sure you understand how and when to take each.           * This list has 2 medication(s) that are the same as other medications prescribed for you. Read the directions carefully, and ask your doctor or other care provider to review them with you.                CONTINUE taking these medications      acetaminophen 650 MG Supp  Commonly known as: TYLENOL  Place 1 suppository (650 mg total) rectally every 8 (eight) hours as needed.     b  complex vitamins capsule  Take 1 capsule by mouth.     cholecalciferol (vitamin D3) 25 mcg (1,000 unit) capsule  Commonly known as: VITAMIN D3  Take 7,000 Units by mouth.     digestive enzymes Cap  Take by mouth.     LIDOcaine-prilocaine cream  Commonly known as: EMLA  Please apply to area 1 hour prior to procedure.     LORazepam 0.5 MG tablet  Commonly known as: ATIVAN  Take 1 tablet (0.5 mg total) by mouth 2 (two) times daily.     olaparib 100 mg Tab  Take 200 mg by mouth 2 (two) times a day.     ondansetron 4 MG tablet  Commonly known as: ZOFRAN  Take 1 tablet (4 mg total) by mouth every 12 (twelve) hours as needed for Nausea.     prochlorperazine 5 MG tablet  Commonly known as: COMPAZINE  Take 5 mg by mouth every 6 (six) hours as needed for Nausea.              Eliza Morse MD  Gynecologic Oncology  [facility] ([address])

## 2023-11-19 NOTE — PROGRESS NOTES
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 The patient reports that she has been having burning like abdominal pain for 2 days that progressed to sharp pains. The pain is diffuse, constant and are aggravated by eating and moving. The patient was previously able to tolerate PO but has not been able to since the pain has worsened. At home, the patient also reports feeling feverish and having chills. She denies urinary symptoms, diarrhea, and endorses constipation. Of note, the patient was admitted - for partial small bowel obstruction.    In the ED, the patient was afebrile with other VSS. The patient received IV dilaudid X 2 and zofran which improved her symptoms.    Hospital Course:  2023- NAEON. Abdominal pain improved with PRN dilaudid and rectal tylenol. Nausea also improving, no further emesis since 1116 AM. Reports belching, no gas or stool.   2023- NAEON. S/p hydral x2 for elevated BP, asymptomatic. No N/V since admission. Passing gas, no stool. Transition from NPO to CLD today.  2023- NAEON. BP elevated although no hydralazine required yesterday/overnight. Tolerated CLD>full liquids without N/V. Passing flatus and s/p 1 BM. Transition to bland diet today, anticipate discharge this afternoon.    Interval History: NAEON. Denies abdominal pain. Tolerated clear liquid and full liquid diets without N/V.  Passing flatus and s/p one BM. Ambulating around her room without assistance. Voiding spontaneously.    Scheduled Meds:   dexAMETHasone  1 mg Intravenous Q12H    enoxparin  40 mg Subcutaneous Q24H (prophylaxis, 1700)    mupirocin   Nasal BID    ondansetron  8 mg Intravenous Q6H    prochlorperazine  2.5 mg Intravenous Q6H     Continuous Infusions:      PRN Meds:acetaminophen, ALPRAZolam, hydrALAZINE, melatonin, prochlorperazine, promethazine, sodium chloride 0.9%    Review of patient's allergies indicates:   Allergen Reactions    Adhesive Dermatitis and Swelling     Skin adhesive (skinaffix)    Iodinated contrast media Other (See Comments), Diarrhea and Shortness Of Breath    Iodine Other (See Comments)     FAINTING, renal issues, Bilateral flank spasms  IODINE IN CONTRAST    Chamomile flower Hives    Codeine     Dyclonine Hives    Mold Other (See Comments)    Pcn [penicillins]     Pennyroyal oil Other (See Comments)    Peppermint Other (See Comments)    Diphenhydramine hcl Rash     Sometimes rash       Objective:     Vital Signs (Most Recent):  Temp: 98.3 °F (36.8 °C) (11/19/23 0418)  Pulse: 93 (11/19/23 0418)  Resp: 20 (11/18/23 2252)  BP: (!) 175/80 (11/19/23 0418)  SpO2: 95 % (11/19/23 0418) Vital Signs (24h Range):  Temp:  [98.3 °F (36.8 °C)-98.8 °F (37.1 °C)] 98.3 °F (36.8 °C)  Pulse:  [] 93  Resp:  [16-20] 20  SpO2:  [95 %-98 %] 95 %  BP: (171-194)/(74-86) 175/80     Weight: 51.3 kg (113 lb)  Body mass index is 18.24 kg/m².    Intake/Output - Last 3 Shifts         11/17 0700 11/18 0659 11/18 0700 11/19 0659 11/19 0700 11/20 0659    P.O.  480     I.V. (mL/kg)       Total Intake(mL/kg)  480 (9.4)     Urine (mL/kg/hr)  300 (0.2)     Total Output  300     Net  +180            Urine Occurrence  2 x     Stool Occurrence  1 x                  Physical Exam:   Constitutional: She is oriented to person, place, and time. She appears well-developed and well-nourished.    HENT:   Head: Normocephalic and  "atraumatic.       Pulmonary/Chest: Effort normal.        Abdominal: Soft. She exhibits no distension. There is abdominal tenderness. There is no rebound and no guarding.   Minimal TTP             Musculoskeletal: Normal range of motion.       Neurological: She is alert and oriented to person, place, and time.    Skin: Skin is warm and dry.    Psychiatric: She has a normal mood and affect. Her behavior is normal.          Lines/Drains/Airways       Central Venous Catheter Line  Duration             Port A Cath Single Lumen Atrial Right -- days              Peripheral Intravenous Line  Duration                  Peripheral IV - Single Lumen 11/16/23 0321 20 G Left Hand 3 days                    Laboratory:  BMP:   No results for input(s): "GLU", "NA", "K", "CL", "CO2", "BUN", "CREATININE", "CALCIUM", "MG" in the last 48 hours.   and CBC:   Recent Labs   Lab 11/17/23  0750   HGB 9.4*   HCT 29.1*         Diagnostic Results:  No new results.   Assessment/Plan:     * Partial small bowel obstruction  -VSS   -CBC stable, WBC 38, Plts 469 likely due to neulasta treatment  -CMP: K 4.9 Cr 1.1>0.9, Mag 1.9, Phos 5.2   -CT: Findings in keeping with acute small bowel obstruction, with a suspected transition to decompressed distal small bowel in the anterior lower abdomen, slightly to the left of midline, with relative proximity to remote operative sequela in the anterior abdominal wall. Enlarging of known right pleural effusion, which may be malignant. Lobular soft tissue within the mesentery and anterior abdominal wall, possibly metastasis.   -Antiemetics: scheduled Zofran IV and Compazine IV, PRN rectal phenergan  - Patient declined NG at time of admission. Reports traumatic experience with NGT during last admission.   - Now s/p BM and passing flatus on HD#3-4  -Diet: Clear liquids>full liquids, transition to bland diet today  --Anticipate discharge this afternoon  -s/p IVF  -Pain control: Dilaudid PRN (not using), rectal " tylenol   -Daily CMP/Mg/Phos: Replace electrolytes PRN with goal K >4.0, Mg > 2,  Phos >3    Encounter for palliative care  - Palliative care consulted, appreciate recommendations and assistance     Malignant neoplasm of both ovaries  -See Gyn Oncology history above  -Cycle 3 of gem/carbo/avastin was scheduled 11/17. Will likely be delayed given acute SBO and hospitalization.  -Lovenox daily for VTE prophylaxis     Hypertension  BP: (171-194)/(74-86) 174/77  -no home medication  -Hydralazine PRN ordered      Chronic kidney disease (CKD)  - Cr on admit 1.2, previously baseline Cr 0.8.   - Cr as of 11/17 back to baseline, Cr 0.9  - Withholding nephrotoxic agents    Ovarian cancer  -Recurrent platinum resistant high grade serous ovarian cancer  -See GYN ONC history      VTE Risk Mitigation (From admission, onward)           Ordered     enoxaparin injection 40 mg  Every 24 hours         11/16/23 0914     IP VTE HIGH RISK PATIENT  Once         11/16/23 0541     Place sequential compression device  Until discontinued         11/16/23 0541                    Eliza Morse MD  Gynecologic Oncology  [facility] ([address])

## 2023-11-20 NOTE — PHYSICIAN QUERY
PT Name: Najma Hung  MR #: [id number]     DOCUMENTATION CLARIFICATION      CDS/: RADHA Chacon,RNC-MNN         Contact information:[email or web address]    This form is a permanent document in the medical record.     Query Date: November 20, 2023    Dear Provider,  By submitting this query, we are merely seeking further clarification of documentation.  Please utilize your independent clinical judgment when addressing the question(s) below.     The Medical Record contains the following:    Supporting Clinical Findings Location in Medical Record   -CT: Findings in keeping with acute small bowel obstruction, with a suspected transition to decompressed distal small bowel in the anterior lower abdomen, slightly to the left of midline, with relative proximity to remote operative sequela in the anterior abdominal wall. Enlarging of known right pleural effusion, which may be malignant. Lobular soft tissue within the mesentery and anterior abdominal wall, possibly metastasis.      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  Gyn Onc Progress note 11/19@745am    7/23- PET: 1. Progression of disease with increased degree of hypermetabolic activity involving the known serosal implants as well as new serosal implant adjacent to the spleen.  2. Concern for new metastatic nodule within the middle lobe     8/23: Admitted for SBO. Progression of disease visualized on imaging.     9/23: Cycle 1 of Norton/Carbo/Avastin       10/23: Cycle 2 of Norton/Carbo/Avastin     68 y.o. with recurrent now platinum resistant high grade serous ovarian cancer admitted with small bowel obstruction. Plan for bowel rest, IVFs, IV dexamethasone and antiemetics. Aggressive electrolyte replacement K>4, Mg>2. If persistent vomiting, NGT placement (pt is declining at this time). We also discussed unfortunate progression on her  CT and that disease is now platinum resistant, as well as implications and challenges in treating this. She understands.  She is agreeable to palliative consult  H&P 11/16   Lower chest: Moderate to large right pleural effusion, increased when compared to the 08/23/2023 CT.  Numerous solid pulmonary nodules felt grossly similar given differences in technique/scan coverage and anatomic distortion imposed by the enlarging right pleural effusion.  Chronic scarring in the region of the right middle lobe and lingula  CT Abdomen 11/16         Please clarify if the malignant pleural effusion diagnosis has been:    [ X ] Ruled In   [  ] Ruled In, Now Resolved   [  ] Ruled Out   [  ] Still to be ruled out at the time of discharge   [  ] Other/Clarification of findings (please specify): _______________    [   ] Clinically undetermined         Please document in your progress notes daily for the duration of treatment, until resolved, and include in your discharge summary.    Form No. [id number]

## 2023-11-20 NOTE — PHYSICIAN QUERY
PT Name: Najma Hung  MR #: [id number]     DOCUMENTATION CLARIFICATION     CDS/: RADHA Chacon,RNC-MNN        Contact information:[email or web address]  This form is a permanent document in the medical record.    Query Date: November 20, 2023    By submitting this query, we are merely seeking further clarification of documentation.  Please utilize your independent clinical judgment when addressing the question(s) below.    The Medical Record contains the following:   Indicator Supporting Clinical Findings Location in Medical Record    Kidney (Renal) Insufficiency     X Kidney (Renal) Failure/Injury Chronic kidney disease (CKD)       CKD 2  Current encounter Gyn Onc Progress note 11/19@745am      Previous encounter progress note 8/26/2023   X Nephrotoxic Agents Withholding nephrotoxic agents  Current encounter Gyn Onc Progress note 11/19@745am     X BUN/Creatinine           GFR - Cr on admit 1.2, previously baseline Cr 0.8.   - Cr as of 11/17 back to baseline, Cr 0.9    BUN=20-->31-->31  Creat=1.2-->1.1-->0.9  GFR=49-->54-->>60 Current encounter Gyn Onc Progress note 11/19@745am            Current encounter LAB 11/16-11/17    Urine: Casts         Eosinophils      Urine Output      Dehydration     X Nausea/Vomiting presents for severe abdominal pain with nausea and vomiting  Current encounter H&P 11/16    Dialysis/CRRT      Treatment      Other         [facility] approved diagnostic criteria for acute kidney injury is based on KDIGO criteria:    An increase in serum creatinine > 0.3mg/dl within 48 hours  OR  Increase in serum creatinine to > 1.5x baseline, which is known or presumed to have occurred within the prior 7 days  OR  Urine volume <0.5 ml/kg/hr for 6 hours       The clinical guidelines noted above are only a system guideline. It does not replace the providers clinical judgment.     Provider, please further specify the diagnosis of Chronic kidney disease (CKD).     [ X   ] Chronic  Kidney Disease (CKD) stage 2 - Mildly decreased eGFR 60-89    [    ] Other (please specify): _______________________________   [  ] Clinically Undetermined       Please document in your progress notes daily for the duration of treatment until resolved and include in your discharge summary.    References:   KDIGO Clinical Practice Guideline for Acute Kidney Injury. (2012, March). Retrieved October 21, 2020, from https://kdigo.org/wp-content/uploads/2016/10/LWYNS-9669-BOA-Guideline-English.pdf    CATRACHITA Stack MD, TIANNA Morris MD, & CORA Painting MD. (1960). Renal medullary necrosis [Abstract]. The American Journal of Medicine, 29(1), 132-156. Doi:https://www.sciencedirect.com/science/article/abs/pii/4235882562462983    CORA Boswell MD, & JESSICA Ayala MD, MS. (2020, June 18). Definition and staging of chronic kidney disease in adults ([id number] TIANNA Willett MD, ScD & [id number] NAZARIO Lehman MD, MSc, Eds.). Retrieved October 21, 2020, from https://www.[employer]/contents/definition-and-staging-of-chronic-kidney-disease-in-adults?search=ckd%20staging&source=search_result&selectedTitle=1~150&usage_type=default&display_rank=1     RICHARD Allen MD, FACP. (2015, Chantal 15). Acute kidney injury revisited. Retrieved October 21, 2020, from https://acphospitalist.org/archives/2015/06/coding-acute-kidney-injury.htm    FRANCES De MD. (2019, July). Renal Cortical Necrosis. Retrieved October 21, 2020, from https://www.[employer]/professional/genitourinary-disorders/renovascular-disorders/renal-cortical-necrosis    Form No. [id number]

## 2023-11-20 NOTE — PHYSICIAN QUERY
PT Name: Najma Hung  MR #: [id number]     DOCUMENTATION CLARIFICATION     CDS/: RADHA Chacon,RNC-MNN       Contact information:[email or web address]  This form is a permanent document in the medical record.     Query Date: November 20, 2023    By submitting this query, we are merely seeking further clarification of documentation to reflect the severity of illness of your patient. Please utilize your independent clinical judgment when addressing the question(s) below.    The medical record reflects the following:     Indicators   Supporting Clinical Findings Location in Medical Record   X Bowel obstruction, intestinal obstruction, LBO or SBO documented Partial small bowel obstruction  Gyn Onc Progress note 11/19@745am   X Radiology findings -CT: Findings in keeping with acute small bowel obstruction, with a suspected transition to decompressed distal small bowel in the anterior lower abdomen, slightly to the left of midline, with relative proximity to remote operative sequela in the anterior abdominal wall. Enlarging of known right pleural effusion, which may be malignant. Lobular soft tissue within the mesentery and anterior abdominal wall, possibly metastasis.   Gyn Onc Progress note 11/19@745am   X Treatment/Medication -Antiemetics: scheduled Zofran IV and Compazine IV, PRN rectal phenergan  - Patient declined NG at time of admission. Reports traumatic experience with NGT during last admission.   - Now s/p BM and passing flatus on HD#3-4  -Diet: Clear liquids>full liquids, transition to bland diet today  --Anticipate discharge this afternoon  -s/p IVF  -Pain control: Dilaudid PRN (not using), rectal tylenol   -Daily CMP/Mg/Phos: Replace electrolytes PRN with goal K >4.0, Mg > 2,  Phos >3 Gyn Onc Progress note 11/19@745am    Procedure/Surgery     X Other tage IIIB high grade serous ovarian cancer s/p 2 cycles of Gemcitabine/Carboplatin/Avastin who presents for severe abdominal pain  with nausea and vomiting. The patient reports that she has been having burning like abdominal pain for 2 days that progressed to sharp pains. The pain is diffuse, constant and are aggravated by eating and moving. The patient was previously able to tolerate PO but has not been able to since the pain has worsened. At home, the patient also reports feeling feverish and having chills. She denies urinary symptoms, diarrhea, and endorses constipation. Of note, the patient was admitted 8/23-8/26 for partial small bowel obstruction.    Gyn Onc Progress note 11/19@745am     Provider, please further specify the bowel obstruction diagnosis:  [  X ] Partial or incomplete intestinal obstruction, due to neoplasm   [   ] Partial or incomplete intestinal obstruction, due to other (please specify): ____________   [   ] Partial or incomplete intestinal obstruction, unknown or unspecified etiology           Please document in your progress notes daily for the duration of treatment until resolved, and include in your discharge summary.

## 2023-11-21 ENCOUNTER — OFFICE VISIT (OUTPATIENT)
Dept: GYNECOLOGIC ONCOLOGY | Facility: CLINIC | Age: 68
End: 2023-11-21
Payer: MEDICARE

## 2023-11-21 ENCOUNTER — LAB VISIT (OUTPATIENT)
Dept: LAB | Facility: OTHER | Age: 68
End: 2023-11-21
Attending: OBSTETRICS & GYNECOLOGY
Payer: MEDICARE

## 2023-11-21 VITALS
WEIGHT: 113 LBS | DIASTOLIC BLOOD PRESSURE: 69 MMHG | BODY MASS INDEX: 18.24 KG/M2 | SYSTOLIC BLOOD PRESSURE: 154 MMHG | HEART RATE: 99 BPM

## 2023-11-21 DIAGNOSIS — C56.3 MALIGNANT NEOPLASM OF BOTH OVARIES: ICD-10-CM

## 2023-11-21 DIAGNOSIS — Z51.11 ENCOUNTER FOR ANTINEOPLASTIC CHEMOTHERAPY: ICD-10-CM

## 2023-11-21 DIAGNOSIS — C56.3 MALIGNANT NEOPLASM OF BOTH OVARIES: Primary | ICD-10-CM

## 2023-11-21 LAB
ALBUMIN SERPL BCP-MCNC: 3.7 G/DL (ref 3.5–5.2)
ALP SERPL-CCNC: 88 U/L (ref 55–135)
ALT SERPL W/O P-5'-P-CCNC: 16 U/L (ref 10–44)
ANION GAP SERPL CALC-SCNC: 11 MMOL/L (ref 8–16)
AST SERPL-CCNC: 23 U/L (ref 10–40)
BILIRUB SERPL-MCNC: 0.4 MG/DL (ref 0.1–1)
BUN SERPL-MCNC: 12 MG/DL (ref 8–23)
CALCIUM SERPL-MCNC: 9.1 MG/DL (ref 8.7–10.5)
CHLORIDE SERPL-SCNC: 104 MMOL/L (ref 95–110)
CO2 SERPL-SCNC: 22 MMOL/L (ref 23–29)
CREAT SERPL-MCNC: 0.8 MG/DL (ref 0.5–1.4)
ERYTHROCYTE [DISTWIDTH] IN BLOOD BY AUTOMATED COUNT: 15.5 % (ref 11.5–14.5)
EST. GFR  (NO RACE VARIABLE): >60 ML/MIN/1.73 M^2
GLUCOSE SERPL-MCNC: 138 MG/DL (ref 70–110)
HCT VFR BLD AUTO: 31.4 % (ref 37–48.5)
HGB BLD-MCNC: 10.2 G/DL (ref 12–16)
IMM GRANULOCYTES # BLD AUTO: 0.03 K/UL (ref 0–0.04)
MCH RBC QN AUTO: 31.7 PG (ref 27–31)
MCHC RBC AUTO-ENTMCNC: 32.5 G/DL (ref 32–36)
MCV RBC AUTO: 98 FL (ref 82–98)
NEUTROPHILS # BLD AUTO: 5.8 K/UL (ref 1.8–7.7)
PLATELET # BLD AUTO: 228 K/UL (ref 150–450)
PMV BLD AUTO: 8.6 FL (ref 9.2–12.9)
POTASSIUM SERPL-SCNC: 3.7 MMOL/L (ref 3.5–5.1)
PROT SERPL-MCNC: 6.9 G/DL (ref 6–8.4)
RBC # BLD AUTO: 3.22 M/UL (ref 4–5.4)
SODIUM SERPL-SCNC: 137 MMOL/L (ref 136–145)
WBC # BLD AUTO: 6.95 K/UL (ref 3.9–12.7)

## 2023-11-21 PROCEDURE — 36415 COLL VENOUS BLD VENIPUNCTURE: CPT | Performed by: OBSTETRICS & GYNECOLOGY

## 2023-11-21 PROCEDURE — 99999 PR PBB SHADOW E&M-EST. PATIENT-LVL III: ICD-10-PCS | Mod: PBBFAC,,, | Performed by: OBSTETRICS & GYNECOLOGY

## 2023-11-21 PROCEDURE — 99215 OFFICE O/P EST HI 40 MIN: CPT | Mod: S$PBB,,, | Performed by: OBSTETRICS & GYNECOLOGY

## 2023-11-21 PROCEDURE — 99999 PR PBB SHADOW E&M-EST. PATIENT-LVL III: CPT | Mod: PBBFAC,,, | Performed by: OBSTETRICS & GYNECOLOGY

## 2023-11-21 PROCEDURE — 99213 OFFICE O/P EST LOW 20 MIN: CPT | Mod: PBBFAC | Performed by: OBSTETRICS & GYNECOLOGY

## 2023-11-21 PROCEDURE — 85027 COMPLETE CBC AUTOMATED: CPT | Performed by: OBSTETRICS & GYNECOLOGY

## 2023-11-21 PROCEDURE — 99215 PR OFFICE/OUTPT VISIT, EST, LEVL V, 40-54 MIN: ICD-10-PCS | Mod: S$PBB,,, | Performed by: OBSTETRICS & GYNECOLOGY

## 2023-11-21 PROCEDURE — 80053 COMPREHEN METABOLIC PANEL: CPT | Performed by: OBSTETRICS & GYNECOLOGY

## 2023-11-21 RX ORDER — PROCHLORPERAZINE MALEATE 5 MG
5 TABLET ORAL EVERY 6 HOURS PRN
Qty: 30 TABLET | Refills: 0 | Status: SHIPPED | OUTPATIENT
Start: 2023-11-21

## 2023-11-21 RX ORDER — DEXAMETHASONE 4 MG/1
TABLET ORAL
Qty: 8 TABLET | Refills: 0 | Status: SHIPPED | OUTPATIENT
Start: 2023-11-21 | End: 2023-11-22

## 2023-11-21 RX ORDER — LORAZEPAM 0.5 MG/1
0.5 TABLET ORAL EVERY 8 HOURS PRN
Qty: 40 TABLET | Refills: 0 | Status: SHIPPED | OUTPATIENT
Start: 2023-11-21 | End: 2024-03-05

## 2023-11-21 RX ORDER — PROMETHAZINE HYDROCHLORIDE 12.5 MG/1
12.5 SUPPOSITORY RECTAL EVERY 6 HOURS PRN
Qty: 20 SUPPOSITORY | Refills: 0 | Status: SHIPPED | OUTPATIENT
Start: 2023-11-21

## 2023-11-21 NOTE — PROGRESS NOTES
Subjective:      Patient ID: Najma Hung is a 68 y.o. female.    Chief Complaint: Chemotherapy    Treatment History  Stage IIIC high-grade serous ovarian cancer  Xlap/BSO/Omentectomy/Pelvic and PA nodes/Peritoneal stripping and debulking 6/20 by Dr. Olivier  T/C x 2, then Carbo x 3 due to patient intolerance of treatment completed 1/28/21  Elected to not pursue maintenance PARPi  CARIS testing negative  Germline normal, CDX positive  Recurrence documented 5/22, received Carbo + neulasta x 1  No treatment until 11/22, when multifocal progression documented on PET  Doxil/Carbo x 4 completed 4/23  Delayed PARPi therapy until starting Lynparza 200 BID 6/23  Progression of disease with recent hospitalization x 2 for partial SBO    HPI  Her today after being hospitalized last week for partial SBO.  Fortunately resolved with conservative management.  Had major side effects related to neulasta and requests decreased dose.  WBC noted to have gone to 38k.  In addition, had questions regarding disease status on scan.  There was a question of increase in number of liver lesions.  Requests refills.  Review of Systems   Constitutional:  Negative for activity change, appetite change, chills, fatigue and fever.   HENT:  Negative for hearing loss, mouth sores, nosebleeds, sore throat and tinnitus.    Eyes:  Negative for visual disturbance.   Respiratory:  Negative for cough, chest tightness, shortness of breath and wheezing.    Cardiovascular:  Negative for chest pain and leg swelling.   Gastrointestinal:  Negative for abdominal distention, abdominal pain, blood in stool, constipation, diarrhea, nausea and vomiting.   Genitourinary:  Negative for dysuria, flank pain, frequency, hematuria, pelvic pain, vaginal bleeding, vaginal discharge and vaginal pain.   Musculoskeletal:  Negative for arthralgias and back pain.   Skin:  Negative for rash.   Neurological:  Negative for dizziness, seizures, syncope, weakness and numbness.    Hematological:  Does not bruise/bleed easily.   Psychiatric/Behavioral:  Negative for confusion and sleep disturbance. The patient is not nervous/anxious.        Objective:   Physical Exam:   Constitutional: She is oriented to person, place, and time. She appears well-developed and well-nourished. No distress.    HENT:   Head: Normocephalic and atraumatic.    Eyes: No scleral icterus.     Cardiovascular:       Exam reveals no cyanosis and no edema.        Pulmonary/Chest: Effort normal. No respiratory distress. She exhibits no tenderness.        Abdominal: Soft. She exhibits no distension, no fluid wave and no mass. There is no abdominal tenderness. There is no rebound and no guarding. No hernia.             Musculoskeletal: Normal range of motion and moves all extremeties. No edema.      Lymphadenopathy:     She has no cervical adenopathy.    Neurological: She is alert and oriented to person, place, and time.    Skin: Skin is warm and dry. No cyanosis. No pallor.    Psychiatric: She has a normal mood and affect. Her behavior is normal.       Assessment:     1. Malignant neoplasm of both ovaries    2. Encounter for antineoplastic chemotherapy        Plan:       Proceed with C3 assuming labs ok.  Will decrease Neulasta dose.  Refill meds.  RTC as scheduled.

## 2023-11-22 RX ORDER — DEXAMETHASONE 4 MG/1
4 TABLET ORAL 2 TIMES DAILY PRN
Qty: 8 TABLET | Refills: 0 | Status: SHIPPED | OUTPATIENT
Start: 2023-11-22

## 2023-11-24 ENCOUNTER — INFUSION (OUTPATIENT)
Dept: INFUSION THERAPY | Facility: HOSPITAL | Age: 68
End: 2023-11-24
Payer: MEDICARE

## 2023-11-24 VITALS
HEART RATE: 106 BPM | SYSTOLIC BLOOD PRESSURE: 154 MMHG | RESPIRATION RATE: 16 BRPM | TEMPERATURE: 98 F | WEIGHT: 110.44 LBS | OXYGEN SATURATION: 96 % | BODY MASS INDEX: 17.83 KG/M2 | DIASTOLIC BLOOD PRESSURE: 71 MMHG

## 2023-11-24 DIAGNOSIS — C56.3 MALIGNANT NEOPLASM OF BOTH OVARIES: Primary | ICD-10-CM

## 2023-11-24 PROCEDURE — 96413 CHEMO IV INFUSION 1 HR: CPT

## 2023-11-24 PROCEDURE — 63600175 PHARM REV CODE 636 W HCPCS: Performed by: OBSTETRICS & GYNECOLOGY

## 2023-11-24 PROCEDURE — 96417 CHEMO IV INFUS EACH ADDL SEQ: CPT

## 2023-11-24 PROCEDURE — 96367 TX/PROPH/DG ADDL SEQ IV INF: CPT

## 2023-11-24 PROCEDURE — 25000003 PHARM REV CODE 250: Performed by: OBSTETRICS & GYNECOLOGY

## 2023-11-24 PROCEDURE — 96375 TX/PRO/DX INJ NEW DRUG ADDON: CPT

## 2023-11-24 PROCEDURE — A4216 STERILE WATER/SALINE, 10 ML: HCPCS | Performed by: OBSTETRICS & GYNECOLOGY

## 2023-11-24 RX ORDER — HEPARIN 100 UNIT/ML
500 SYRINGE INTRAVENOUS
Status: CANCELLED | OUTPATIENT
Start: 2023-11-24

## 2023-11-24 RX ORDER — SODIUM CHLORIDE 0.9 % (FLUSH) 0.9 %
10 SYRINGE (ML) INJECTION
Status: CANCELLED | OUTPATIENT
Start: 2023-11-24

## 2023-11-24 RX ORDER — SODIUM CHLORIDE 0.9 % (FLUSH) 0.9 %
10 SYRINGE (ML) INJECTION
Status: DISCONTINUED | OUTPATIENT
Start: 2023-11-24 | End: 2023-11-24 | Stop reason: HOSPADM

## 2023-11-24 RX ORDER — ONDANSETRON 2 MG/ML
8 INJECTION INTRAMUSCULAR; INTRAVENOUS
Status: CANCELLED | OUTPATIENT
Start: 2023-11-25

## 2023-11-24 RX ORDER — EPINEPHRINE 0.3 MG/.3ML
0.3 INJECTION SUBCUTANEOUS ONCE AS NEEDED
Status: DISCONTINUED | OUTPATIENT
Start: 2023-11-24 | End: 2023-11-24 | Stop reason: HOSPADM

## 2023-11-24 RX ORDER — DIPHENHYDRAMINE HYDROCHLORIDE 50 MG/ML
50 INJECTION INTRAMUSCULAR; INTRAVENOUS ONCE AS NEEDED
Status: DISCONTINUED | OUTPATIENT
Start: 2023-11-24 | End: 2023-11-24 | Stop reason: HOSPADM

## 2023-11-24 RX ORDER — HEPARIN 100 UNIT/ML
500 SYRINGE INTRAVENOUS
Status: DISCONTINUED | OUTPATIENT
Start: 2023-11-24 | End: 2023-11-24 | Stop reason: HOSPADM

## 2023-11-24 RX ORDER — SODIUM CHLORIDE 0.9 % (FLUSH) 0.9 %
10 SYRINGE (ML) INJECTION
Status: CANCELLED | OUTPATIENT
Start: 2023-11-25

## 2023-11-24 RX ORDER — DIPHENHYDRAMINE HYDROCHLORIDE 50 MG/ML
50 INJECTION INTRAMUSCULAR; INTRAVENOUS ONCE AS NEEDED
Status: CANCELLED | OUTPATIENT
Start: 2023-11-25

## 2023-11-24 RX ORDER — DIPHENHYDRAMINE HYDROCHLORIDE 50 MG/ML
50 INJECTION INTRAMUSCULAR; INTRAVENOUS ONCE AS NEEDED
Status: CANCELLED | OUTPATIENT
Start: 2023-11-24

## 2023-11-24 RX ORDER — HEPARIN 100 UNIT/ML
500 SYRINGE INTRAVENOUS
Status: CANCELLED | OUTPATIENT
Start: 2023-11-25

## 2023-11-24 RX ORDER — EPINEPHRINE 0.3 MG/.3ML
0.3 INJECTION SUBCUTANEOUS ONCE AS NEEDED
Status: CANCELLED | OUTPATIENT
Start: 2023-11-24

## 2023-11-24 RX ORDER — EPINEPHRINE 0.3 MG/.3ML
0.3 INJECTION SUBCUTANEOUS ONCE AS NEEDED
Status: CANCELLED | OUTPATIENT
Start: 2023-11-25

## 2023-11-24 RX ADMIN — HEPARIN 500 UNITS: 100 SYRINGE at 12:11

## 2023-11-24 RX ADMIN — CARBOPLATIN 315 MG: 10 INJECTION, SOLUTION INTRAVENOUS at 11:11

## 2023-11-24 RX ADMIN — Medication 10 ML: at 12:11

## 2023-11-24 RX ADMIN — BEVACIZUMAB-AWWB 790 MG: 400 INJECTION, SOLUTION INTRAVENOUS at 09:11

## 2023-11-24 RX ADMIN — SODIUM CHLORIDE: 9 INJECTION, SOLUTION INTRAVENOUS at 08:11

## 2023-11-24 RX ADMIN — DEXAMETHASONE SODIUM PHOSPHATE 0.25 MG: 4 INJECTION, SOLUTION INTRA-ARTICULAR; INTRALESIONAL; INTRAMUSCULAR; INTRAVENOUS; SOFT TISSUE at 10:11

## 2023-11-24 RX ADMIN — APREPITANT 130 MG: 130 INJECTION, EMULSION INTRAVENOUS at 10:11

## 2023-11-24 RX ADMIN — GEMCITABINE HYDROCHLORIDE 1060 MG: 200 INJECTION, SOLUTION INTRAVENOUS at 11:11

## 2023-11-24 NOTE — PLAN OF CARE
Problem: Nausea and Vomiting (Chemotherapy Effects)  Goal: Fluid and Electrolyte Balance  Outcome: Ongoing, Progressing       Problem: Fatigue  Goal: Improved Activity Tolerance  Outcome: Ongoing, Progressing     Problem: Pain Acute  Goal: Acceptable Pain Control and Functional Ability  Outcome: Ongoing, Progressing     Problem: Fall Injury Risk  Goal: Absence of Fall and Fall-Related Injury  Outcome: Ongoing, Progressing     Problem: Infection  Goal: Absence of Infection Signs and Symptoms  Outcome: Ongoing, Progressing      Ambulatory to clinic with spouse.  No C/O adverse effects or S/S of infection.  VSS.  Port accessed, flush without difficulty with blood return noted.  Tolerated premeds, MVASI, Genzar and Carboplatin well.  AVS given.  Ambulatory home with .  NAD noted.

## 2023-11-29 ENCOUNTER — LAB VISIT (OUTPATIENT)
Dept: LAB | Facility: OTHER | Age: 68
End: 2023-11-29
Attending: OBSTETRICS & GYNECOLOGY
Payer: MEDICARE

## 2023-11-29 DIAGNOSIS — C56.3 MALIGNANT NEOPLASM OF BOTH OVARIES: ICD-10-CM

## 2023-11-29 LAB
ALBUMIN SERPL BCP-MCNC: 3.9 G/DL (ref 3.5–5.2)
ALP SERPL-CCNC: 93 U/L (ref 55–135)
ALT SERPL W/O P-5'-P-CCNC: 17 U/L (ref 10–44)
ANION GAP SERPL CALC-SCNC: 11 MMOL/L (ref 8–16)
AST SERPL-CCNC: 23 U/L (ref 10–40)
BILIRUB SERPL-MCNC: 0.5 MG/DL (ref 0.1–1)
BUN SERPL-MCNC: 17 MG/DL (ref 8–23)
CALCIUM SERPL-MCNC: 9.2 MG/DL (ref 8.7–10.5)
CHLORIDE SERPL-SCNC: 101 MMOL/L (ref 95–110)
CO2 SERPL-SCNC: 24 MMOL/L (ref 23–29)
CREAT SERPL-MCNC: 0.8 MG/DL (ref 0.5–1.4)
ERYTHROCYTE [DISTWIDTH] IN BLOOD BY AUTOMATED COUNT: 15.1 % (ref 11.5–14.5)
EST. GFR  (NO RACE VARIABLE): >60 ML/MIN/1.73 M^2
GLUCOSE SERPL-MCNC: 121 MG/DL (ref 70–110)
HCT VFR BLD AUTO: 32.4 % (ref 37–48.5)
HGB BLD-MCNC: 10.4 G/DL (ref 12–16)
IMM GRANULOCYTES # BLD AUTO: 0.02 K/UL (ref 0–0.04)
MCH RBC QN AUTO: 31.8 PG (ref 27–31)
MCHC RBC AUTO-ENTMCNC: 32.1 G/DL (ref 32–36)
MCV RBC AUTO: 99 FL (ref 82–98)
NEUTROPHILS # BLD AUTO: 4.5 K/UL (ref 1.8–7.7)
PLATELET # BLD AUTO: 241 K/UL (ref 150–450)
PMV BLD AUTO: 9 FL (ref 9.2–12.9)
POTASSIUM SERPL-SCNC: 3.8 MMOL/L (ref 3.5–5.1)
PROT SERPL-MCNC: 7.3 G/DL (ref 6–8.4)
RBC # BLD AUTO: 3.27 M/UL (ref 4–5.4)
SODIUM SERPL-SCNC: 136 MMOL/L (ref 136–145)
WBC # BLD AUTO: 5.39 K/UL (ref 3.9–12.7)

## 2023-11-29 PROCEDURE — 80053 COMPREHEN METABOLIC PANEL: CPT | Performed by: OBSTETRICS & GYNECOLOGY

## 2023-11-29 PROCEDURE — 36415 COLL VENOUS BLD VENIPUNCTURE: CPT | Performed by: OBSTETRICS & GYNECOLOGY

## 2023-11-29 PROCEDURE — 85027 COMPLETE CBC AUTOMATED: CPT | Performed by: OBSTETRICS & GYNECOLOGY

## 2023-12-01 ENCOUNTER — INFUSION (OUTPATIENT)
Dept: INFUSION THERAPY | Facility: HOSPITAL | Age: 68
End: 2023-12-01
Payer: MEDICARE

## 2023-12-01 VITALS
HEART RATE: 92 BPM | RESPIRATION RATE: 16 BRPM | WEIGHT: 110.69 LBS | TEMPERATURE: 98 F | BODY MASS INDEX: 17.79 KG/M2 | DIASTOLIC BLOOD PRESSURE: 67 MMHG | SYSTOLIC BLOOD PRESSURE: 141 MMHG | HEIGHT: 66 IN

## 2023-12-01 DIAGNOSIS — C56.3 MALIGNANT NEOPLASM OF BOTH OVARIES: Primary | ICD-10-CM

## 2023-12-01 PROCEDURE — A4216 STERILE WATER/SALINE, 10 ML: HCPCS | Performed by: OBSTETRICS & GYNECOLOGY

## 2023-12-01 PROCEDURE — 25000003 PHARM REV CODE 250: Performed by: OBSTETRICS & GYNECOLOGY

## 2023-12-01 PROCEDURE — 96375 TX/PRO/DX INJ NEW DRUG ADDON: CPT

## 2023-12-01 PROCEDURE — 63600175 PHARM REV CODE 636 W HCPCS: Performed by: OBSTETRICS & GYNECOLOGY

## 2023-12-01 PROCEDURE — 96413 CHEMO IV INFUSION 1 HR: CPT

## 2023-12-01 RX ORDER — SODIUM CHLORIDE 0.9 % (FLUSH) 0.9 %
10 SYRINGE (ML) INJECTION
Status: DISCONTINUED | OUTPATIENT
Start: 2023-12-01 | End: 2023-12-01 | Stop reason: HOSPADM

## 2023-12-01 RX ORDER — DIPHENHYDRAMINE HYDROCHLORIDE 50 MG/ML
50 INJECTION INTRAMUSCULAR; INTRAVENOUS ONCE AS NEEDED
Status: DISCONTINUED | OUTPATIENT
Start: 2023-12-01 | End: 2023-12-01 | Stop reason: HOSPADM

## 2023-12-01 RX ORDER — HEPARIN 100 UNIT/ML
500 SYRINGE INTRAVENOUS
Status: DISCONTINUED | OUTPATIENT
Start: 2023-12-01 | End: 2023-12-01 | Stop reason: HOSPADM

## 2023-12-01 RX ORDER — ONDANSETRON 2 MG/ML
8 INJECTION INTRAMUSCULAR; INTRAVENOUS
Status: COMPLETED | OUTPATIENT
Start: 2023-12-01 | End: 2023-12-01

## 2023-12-01 RX ORDER — EPINEPHRINE 0.3 MG/.3ML
0.3 INJECTION SUBCUTANEOUS ONCE AS NEEDED
Status: DISCONTINUED | OUTPATIENT
Start: 2023-12-01 | End: 2023-12-01 | Stop reason: HOSPADM

## 2023-12-01 RX ADMIN — GEMCITABINE HYDROCHLORIDE 1060 MG: 1 INJECTION, SOLUTION INTRAVENOUS at 08:12

## 2023-12-01 RX ADMIN — ONDANSETRON 8 MG: 2 INJECTION INTRAMUSCULAR; INTRAVENOUS at 08:12

## 2023-12-01 RX ADMIN — HEPARIN 500 UNITS: 100 SYRINGE at 09:12

## 2023-12-01 RX ADMIN — SODIUM CHLORIDE: 9 INJECTION, SOLUTION INTRAVENOUS at 08:12

## 2023-12-01 RX ADMIN — Medication 10 ML: at 09:12

## 2023-12-08 ENCOUNTER — PATIENT MESSAGE (OUTPATIENT)
Dept: GYNECOLOGIC ONCOLOGY | Facility: CLINIC | Age: 68
End: 2023-12-08
Payer: MEDICARE

## 2023-12-11 ENCOUNTER — OFFICE VISIT (OUTPATIENT)
Dept: GYNECOLOGIC ONCOLOGY | Facility: CLINIC | Age: 68
End: 2023-12-11
Payer: MEDICARE

## 2023-12-11 ENCOUNTER — LAB VISIT (OUTPATIENT)
Dept: LAB | Facility: OTHER | Age: 68
End: 2023-12-11
Attending: OBSTETRICS & GYNECOLOGY
Payer: MEDICARE

## 2023-12-11 VITALS
SYSTOLIC BLOOD PRESSURE: 150 MMHG | BODY MASS INDEX: 17.84 KG/M2 | HEIGHT: 66 IN | DIASTOLIC BLOOD PRESSURE: 70 MMHG | WEIGHT: 111 LBS | HEART RATE: 88 BPM

## 2023-12-11 DIAGNOSIS — C56.3 MALIGNANT NEOPLASM OF BOTH OVARIES: Primary | ICD-10-CM

## 2023-12-11 DIAGNOSIS — C56.3 MALIGNANT NEOPLASM OF BOTH OVARIES: ICD-10-CM

## 2023-12-11 DIAGNOSIS — Z51.11 ENCOUNTER FOR ANTINEOPLASTIC CHEMOTHERAPY: ICD-10-CM

## 2023-12-11 DIAGNOSIS — D70.2 NEUTROPENIA, DRUG-INDUCED: ICD-10-CM

## 2023-12-11 LAB
ALBUMIN SERPL BCP-MCNC: 4.1 G/DL (ref 3.5–5.2)
ALP SERPL-CCNC: 92 U/L (ref 55–135)
ALT SERPL W/O P-5'-P-CCNC: 17 U/L (ref 10–44)
ANION GAP SERPL CALC-SCNC: 9 MMOL/L (ref 8–16)
AST SERPL-CCNC: 22 U/L (ref 10–40)
BACTERIA #/AREA URNS HPF: NORMAL /HPF
BILIRUB SERPL-MCNC: 0.3 MG/DL (ref 0.1–1)
BILIRUB UR QL STRIP: NEGATIVE
BUN SERPL-MCNC: 17 MG/DL (ref 8–23)
CALCIUM SERPL-MCNC: 9.6 MG/DL (ref 8.7–10.5)
CANCER AG125 SERPL-ACNC: 1136 U/ML (ref 0–30)
CHLORIDE SERPL-SCNC: 107 MMOL/L (ref 95–110)
CLARITY UR: CLEAR
CO2 SERPL-SCNC: 25 MMOL/L (ref 23–29)
COLOR UR: YELLOW
CREAT SERPL-MCNC: 0.8 MG/DL (ref 0.5–1.4)
ERYTHROCYTE [DISTWIDTH] IN BLOOD BY AUTOMATED COUNT: 16 % (ref 11.5–14.5)
EST. GFR  (NO RACE VARIABLE): >60 ML/MIN/1.73 M^2
GLUCOSE SERPL-MCNC: 102 MG/DL (ref 70–110)
GLUCOSE UR QL STRIP: NEGATIVE
HCT VFR BLD AUTO: 28 % (ref 37–48.5)
HGB BLD-MCNC: 9.3 G/DL (ref 12–16)
HGB UR QL STRIP: NEGATIVE
IMM GRANULOCYTES # BLD AUTO: 0 K/UL (ref 0–0.04)
KETONES UR QL STRIP: NEGATIVE
LEUKOCYTE ESTERASE UR QL STRIP: ABNORMAL
MCH RBC QN AUTO: 32.6 PG (ref 27–31)
MCHC RBC AUTO-ENTMCNC: 33.2 G/DL (ref 32–36)
MCV RBC AUTO: 98 FL (ref 82–98)
MICROSCOPIC COMMENT: NORMAL
NEUTROPHILS # BLD AUTO: 1.4 K/UL (ref 1.8–7.7)
NITRITE UR QL STRIP: NEGATIVE
PH UR STRIP: 5 [PH] (ref 5–8)
PLATELET # BLD AUTO: 92 K/UL (ref 150–450)
PMV BLD AUTO: 9.1 FL (ref 9.2–12.9)
POTASSIUM SERPL-SCNC: 3.8 MMOL/L (ref 3.5–5.1)
PROT SERPL-MCNC: 7.4 G/DL (ref 6–8.4)
PROT UR QL STRIP: ABNORMAL
RBC # BLD AUTO: 2.85 M/UL (ref 4–5.4)
RBC #/AREA URNS HPF: 2 /HPF (ref 0–4)
SODIUM SERPL-SCNC: 141 MMOL/L (ref 136–145)
SP GR UR STRIP: 1.02 (ref 1–1.03)
SQUAMOUS #/AREA URNS HPF: 1 /HPF
URN SPEC COLLECT METH UR: ABNORMAL
UROBILINOGEN UR STRIP-ACNC: NEGATIVE EU/DL
WBC # BLD AUTO: 2.23 K/UL (ref 3.9–12.7)
WBC #/AREA URNS HPF: 5 /HPF (ref 0–5)

## 2023-12-11 PROCEDURE — 80053 COMPREHEN METABOLIC PANEL: CPT | Performed by: OBSTETRICS & GYNECOLOGY

## 2023-12-11 PROCEDURE — 99215 OFFICE O/P EST HI 40 MIN: CPT | Mod: S$PBB,,, | Performed by: OBSTETRICS & GYNECOLOGY

## 2023-12-11 PROCEDURE — 86304 IMMUNOASSAY TUMOR CA 125: CPT | Performed by: OBSTETRICS & GYNECOLOGY

## 2023-12-11 PROCEDURE — 81000 URINALYSIS NONAUTO W/SCOPE: CPT | Performed by: OBSTETRICS & GYNECOLOGY

## 2023-12-11 PROCEDURE — 85027 COMPLETE CBC AUTOMATED: CPT | Performed by: OBSTETRICS & GYNECOLOGY

## 2023-12-11 PROCEDURE — 99999 PR PBB SHADOW E&M-EST. PATIENT-LVL III: ICD-10-PCS | Mod: PBBFAC,,, | Performed by: OBSTETRICS & GYNECOLOGY

## 2023-12-11 PROCEDURE — 99215 PR OFFICE/OUTPT VISIT, EST, LEVL V, 40-54 MIN: ICD-10-PCS | Mod: S$PBB,,, | Performed by: OBSTETRICS & GYNECOLOGY

## 2023-12-11 PROCEDURE — 99213 OFFICE O/P EST LOW 20 MIN: CPT | Mod: PBBFAC | Performed by: OBSTETRICS & GYNECOLOGY

## 2023-12-11 PROCEDURE — 36415 COLL VENOUS BLD VENIPUNCTURE: CPT | Performed by: OBSTETRICS & GYNECOLOGY

## 2023-12-11 PROCEDURE — 99999 PR PBB SHADOW E&M-EST. PATIENT-LVL III: CPT | Mod: PBBFAC,,, | Performed by: OBSTETRICS & GYNECOLOGY

## 2023-12-11 NOTE — PROGRESS NOTES
Subjective:      Patient ID: Najma Hung is a 68 y.o. female.    Chief Complaint: Chemotherapy    Treatment History  Stage IIIC high-grade serous ovarian cancer  Xlap/BSO/Omentectomy/Pelvic and PA nodes/Peritoneal stripping and debulking 6/20 by Dr. Olivier  T/C x 2, then Carbo x 3 due to patient intolerance of treatment completed 1/28/21  Elected to not pursue maintenance PARPi  CARIS testing negative  Germline normal, CDX positive  Recurrence documented 5/22, received Carbo + neulasta x 1  No treatment until 11/22, when multifocal progression documented on PET  Doxil/Carbo x 4 completed 4/23  Delayed PARPi therapy until starting Lynparza 200 BID 6/23  Progression of disease with recent hospitalization x 2 for partial SBO    HPI  Her today after completing 3 cycles of treatment.  Had a scan with last admission for SBO.  Still has palpable anterior abdominal wall nodule.  Has been eating but still losing weight.  Labs today overall ok with ANC 1.4 but not due for treatment until Friday.  Review of Systems   Constitutional:  Negative for activity change, appetite change, chills, fatigue and fever.   HENT:  Negative for hearing loss, mouth sores, nosebleeds, sore throat and tinnitus.    Eyes:  Negative for visual disturbance.   Respiratory:  Negative for cough, chest tightness, shortness of breath and wheezing.    Cardiovascular:  Negative for chest pain and leg swelling.   Gastrointestinal:  Negative for abdominal distention, abdominal pain, blood in stool, constipation, diarrhea, nausea and vomiting.   Genitourinary:  Negative for dysuria, flank pain, frequency, hematuria, pelvic pain, vaginal bleeding, vaginal discharge and vaginal pain.   Musculoskeletal:  Negative for arthralgias and back pain.   Skin:  Negative for rash.   Neurological:  Negative for dizziness, seizures, syncope, weakness and numbness.   Hematological:  Does not bruise/bleed easily.   Psychiatric/Behavioral:  Negative for confusion and  sleep disturbance. The patient is not nervous/anxious.        Objective:   Physical Exam:   Constitutional: She is oriented to person, place, and time. She appears well-developed and well-nourished. No distress.    HENT:   Head: Normocephalic and atraumatic.    Eyes: No scleral icterus.     Cardiovascular:       Exam reveals no cyanosis and no edema.        Pulmonary/Chest: Effort normal. No respiratory distress. She exhibits no tenderness.        Abdominal: Soft. She exhibits no distension, no fluid wave and no mass. There is no abdominal tenderness. There is no rebound and no guarding. No hernia.             Musculoskeletal: Normal range of motion and moves all extremeties. No edema.      Lymphadenopathy:     She has no cervical adenopathy.    Neurological: She is alert and oriented to person, place, and time.    Skin: Skin is warm and dry. No cyanosis. No pallor.    Psychiatric: She has a normal mood and affect. Her behavior is normal.       Assessment:     1. Malignant neoplasm of both ovaries    2. Encounter for antineoplastic chemotherapy    3. Neutropenia, drug-induced        Plan:       Proceed with C4 assuming ANC recovers.  Will plan to skip D8 as she will be out of town for the holidays.  Will give a total of 5 cycles then repeat PET.  She and her  were in agreement with this plan and all questions answered.

## 2023-12-15 ENCOUNTER — INFUSION (OUTPATIENT)
Dept: INFUSION THERAPY | Facility: HOSPITAL | Age: 68
End: 2023-12-15
Payer: MEDICARE

## 2023-12-15 VITALS
WEIGHT: 108.94 LBS | OXYGEN SATURATION: 95 % | SYSTOLIC BLOOD PRESSURE: 146 MMHG | DIASTOLIC BLOOD PRESSURE: 67 MMHG | TEMPERATURE: 98 F | RESPIRATION RATE: 16 BRPM | BODY MASS INDEX: 17.58 KG/M2 | HEART RATE: 79 BPM

## 2023-12-15 DIAGNOSIS — C56.3 MALIGNANT NEOPLASM OF BOTH OVARIES: Primary | ICD-10-CM

## 2023-12-15 LAB
ERYTHROCYTE [DISTWIDTH] IN BLOOD BY AUTOMATED COUNT: 16 % (ref 11.5–14.5)
HCT VFR BLD AUTO: 28.1 % (ref 37–48.5)
HGB BLD-MCNC: 9.3 G/DL (ref 12–16)
IMM GRANULOCYTES # BLD AUTO: 0 K/UL (ref 0–0.04)
MCH RBC QN AUTO: 33.1 PG (ref 27–31)
MCHC RBC AUTO-ENTMCNC: 33.1 G/DL (ref 32–36)
MCV RBC AUTO: 100 FL (ref 82–98)
NEUTROPHILS # BLD AUTO: 1.5 K/UL (ref 1.8–7.7)
PLATELET # BLD AUTO: 195 K/UL (ref 150–450)
PMV BLD AUTO: 8.8 FL (ref 9.2–12.9)
RBC # BLD AUTO: 2.81 M/UL (ref 4–5.4)
WBC # BLD AUTO: 2.4 K/UL (ref 3.9–12.7)

## 2023-12-15 PROCEDURE — 85027 COMPLETE CBC AUTOMATED: CPT | Performed by: OBSTETRICS & GYNECOLOGY

## 2023-12-15 PROCEDURE — A4216 STERILE WATER/SALINE, 10 ML: HCPCS | Performed by: OBSTETRICS & GYNECOLOGY

## 2023-12-15 PROCEDURE — 96367 TX/PROPH/DG ADDL SEQ IV INF: CPT

## 2023-12-15 PROCEDURE — 96417 CHEMO IV INFUS EACH ADDL SEQ: CPT

## 2023-12-15 PROCEDURE — 25000003 PHARM REV CODE 250: Performed by: OBSTETRICS & GYNECOLOGY

## 2023-12-15 PROCEDURE — 63600175 PHARM REV CODE 636 W HCPCS: Performed by: OBSTETRICS & GYNECOLOGY

## 2023-12-15 PROCEDURE — 96375 TX/PRO/DX INJ NEW DRUG ADDON: CPT

## 2023-12-15 PROCEDURE — 96413 CHEMO IV INFUSION 1 HR: CPT

## 2023-12-15 RX ORDER — SODIUM CHLORIDE 0.9 % (FLUSH) 0.9 %
10 SYRINGE (ML) INJECTION
Status: CANCELLED | OUTPATIENT
Start: 2023-12-15

## 2023-12-15 RX ORDER — SODIUM CHLORIDE 0.9 % (FLUSH) 0.9 %
10 SYRINGE (ML) INJECTION
Status: CANCELLED | OUTPATIENT
Start: 2023-12-22

## 2023-12-15 RX ORDER — DIPHENHYDRAMINE HYDROCHLORIDE 50 MG/ML
50 INJECTION INTRAMUSCULAR; INTRAVENOUS ONCE AS NEEDED
Status: CANCELLED | OUTPATIENT
Start: 2023-12-22

## 2023-12-15 RX ORDER — EPINEPHRINE 0.3 MG/.3ML
0.3 INJECTION SUBCUTANEOUS ONCE AS NEEDED
Status: DISCONTINUED | OUTPATIENT
Start: 2023-12-15 | End: 2023-12-15 | Stop reason: HOSPADM

## 2023-12-15 RX ORDER — DIPHENHYDRAMINE HYDROCHLORIDE 50 MG/ML
50 INJECTION INTRAMUSCULAR; INTRAVENOUS ONCE AS NEEDED
Status: DISCONTINUED | OUTPATIENT
Start: 2023-12-15 | End: 2023-12-15 | Stop reason: HOSPADM

## 2023-12-15 RX ORDER — EPINEPHRINE 0.3 MG/.3ML
0.3 INJECTION SUBCUTANEOUS ONCE AS NEEDED
Status: CANCELLED | OUTPATIENT
Start: 2023-12-15

## 2023-12-15 RX ORDER — HEPARIN 100 UNIT/ML
500 SYRINGE INTRAVENOUS
Status: CANCELLED | OUTPATIENT
Start: 2023-12-15

## 2023-12-15 RX ORDER — DIPHENHYDRAMINE HYDROCHLORIDE 50 MG/ML
50 INJECTION INTRAMUSCULAR; INTRAVENOUS ONCE AS NEEDED
Status: CANCELLED | OUTPATIENT
Start: 2023-12-15

## 2023-12-15 RX ORDER — SODIUM CHLORIDE 0.9 % (FLUSH) 0.9 %
10 SYRINGE (ML) INJECTION
Status: DISCONTINUED | OUTPATIENT
Start: 2023-12-15 | End: 2023-12-15 | Stop reason: HOSPADM

## 2023-12-15 RX ORDER — HEPARIN 100 UNIT/ML
500 SYRINGE INTRAVENOUS
Status: DISCONTINUED | OUTPATIENT
Start: 2023-12-15 | End: 2023-12-15 | Stop reason: HOSPADM

## 2023-12-15 RX ORDER — EPINEPHRINE 0.3 MG/.3ML
0.3 INJECTION SUBCUTANEOUS ONCE AS NEEDED
Status: CANCELLED | OUTPATIENT
Start: 2023-12-22

## 2023-12-15 RX ORDER — ONDANSETRON 2 MG/ML
8 INJECTION INTRAMUSCULAR; INTRAVENOUS
Status: CANCELLED | OUTPATIENT
Start: 2023-12-22

## 2023-12-15 RX ORDER — HEPARIN 100 UNIT/ML
500 SYRINGE INTRAVENOUS
Status: CANCELLED | OUTPATIENT
Start: 2023-12-22

## 2023-12-15 RX ADMIN — APREPITANT 130 MG: 130 INJECTION, EMULSION INTRAVENOUS at 11:12

## 2023-12-15 RX ADMIN — HEPARIN 500 UNITS: 100 SYRINGE at 01:12

## 2023-12-15 RX ADMIN — GEMCITABINE HYDROCHLORIDE 1060 MG: 200 INJECTION, SOLUTION INTRAVENOUS at 12:12

## 2023-12-15 RX ADMIN — DEXAMETHASONE SODIUM PHOSPHATE 0.25 MG: 4 INJECTION, SOLUTION INTRA-ARTICULAR; INTRALESIONAL; INTRAMUSCULAR; INTRAVENOUS; SOFT TISSUE at 11:12

## 2023-12-15 RX ADMIN — Medication 10 ML: at 01:12

## 2023-12-15 RX ADMIN — BEVACIZUMAB-AWWB 790 MG: 400 INJECTION, SOLUTION INTRAVENOUS at 10:12

## 2023-12-15 RX ADMIN — CARBOPLATIN 315 MG: 10 INJECTION, SOLUTION INTRAVENOUS at 12:12

## 2023-12-15 NOTE — PLAN OF CARE
Problem: Anemia (Chemotherapy Effects)  Goal: Anemia Symptom Improvement  Outcome: Ongoing, Progressing     Problem: Urinary Bleeding Risk or Actual (Chemotherapy Effects)  Goal: Absence of Hematuria  Outcome: Ongoing, Progressing     Problem: Nausea and Vomiting (Chemotherapy Effects)  Goal: Fluid and Electrolyte Balance  Outcome: Ongoing, Progressing     Problem: Neurotoxicity (Chemotherapy Effects)  Goal: Neurotoxicity Symptom Control  Outcome: Ongoing, Progressing     Problem: Neutropenia (Chemotherapy Effects)  Goal: Absence of Infection  Outcome: Ongoing, Progressing     Problem: Oral Mucositis (Chemotherapy Effects)  Goal: Improved Oral Mucous Membrane Integrity  Outcome: Ongoing, Progressing     Problem: Thrombocytopenia Bleeding Risk (Chemotherapy Effects)  Goal: Absence of Bleeding  Outcome: Ongoing, Progressing    Ambulatory to clinic with spouse.  No C/O adverse effects or S/S of infection.  ANC rechecked per provider note and resulted 1.5.  Provider okayed to proceed with treatment today.  Port accessed, flushed without difficulty, blood return noted, and infused with out problems.  Premeds, MVASI, Gemzar and Carboplatin tolerated with no problems.  VS monitored and documented per orders.  Ambulatory home with spouse.  NAD noted.

## 2024-01-03 ENCOUNTER — LAB VISIT (OUTPATIENT)
Dept: LAB | Facility: OTHER | Age: 69
End: 2024-01-03
Attending: OBSTETRICS & GYNECOLOGY
Payer: MEDICARE

## 2024-01-03 DIAGNOSIS — C56.3 MALIGNANT NEOPLASM OF BOTH OVARIES: ICD-10-CM

## 2024-01-03 DIAGNOSIS — Z51.11 ENCOUNTER FOR ANTINEOPLASTIC CHEMOTHERAPY: ICD-10-CM

## 2024-01-03 LAB
ALBUMIN SERPL BCP-MCNC: 4 G/DL (ref 3.5–5.2)
ALP SERPL-CCNC: 100 U/L (ref 55–135)
ALT SERPL W/O P-5'-P-CCNC: 14 U/L (ref 10–44)
ANION GAP SERPL CALC-SCNC: 11 MMOL/L (ref 8–16)
AST SERPL-CCNC: 19 U/L (ref 10–40)
BACTERIA #/AREA URNS HPF: ABNORMAL /HPF
BILIRUB SERPL-MCNC: 0.4 MG/DL (ref 0.1–1)
BILIRUB UR QL STRIP: ABNORMAL
BUN SERPL-MCNC: 18 MG/DL (ref 8–23)
CALCIUM SERPL-MCNC: 9.5 MG/DL (ref 8.7–10.5)
CANCER AG125 SERPL-ACNC: 1116 U/ML (ref 0–30)
CHLORIDE SERPL-SCNC: 104 MMOL/L (ref 95–110)
CLARITY UR: CLEAR
CO2 SERPL-SCNC: 25 MMOL/L (ref 23–29)
COLOR UR: YELLOW
CREAT SERPL-MCNC: 0.8 MG/DL (ref 0.5–1.4)
ERYTHROCYTE [DISTWIDTH] IN BLOOD BY AUTOMATED COUNT: 16.2 % (ref 11.5–14.5)
EST. GFR  (NO RACE VARIABLE): >60 ML/MIN/1.73 M^2
GLUCOSE SERPL-MCNC: 102 MG/DL (ref 70–110)
GLUCOSE UR QL STRIP: NEGATIVE
HCT VFR BLD AUTO: 29.4 % (ref 37–48.5)
HGB BLD-MCNC: 9.6 G/DL (ref 12–16)
HGB UR QL STRIP: ABNORMAL
HYALINE CASTS #/AREA URNS LPF: 0 /LPF
IMM GRANULOCYTES # BLD AUTO: 0 K/UL (ref 0–0.04)
KETONES UR QL STRIP: ABNORMAL
LEUKOCYTE ESTERASE UR QL STRIP: ABNORMAL
MCH RBC QN AUTO: 32.3 PG (ref 27–31)
MCHC RBC AUTO-ENTMCNC: 32.7 G/DL (ref 32–36)
MCV RBC AUTO: 99 FL (ref 82–98)
MICROSCOPIC COMMENT: ABNORMAL
NEUTROPHILS # BLD AUTO: 1.5 K/UL (ref 1.8–7.7)
NITRITE UR QL STRIP: NEGATIVE
PH UR STRIP: 6 [PH] (ref 5–8)
PLATELET # BLD AUTO: 203 K/UL (ref 150–450)
PMV BLD AUTO: 8.4 FL (ref 9.2–12.9)
POTASSIUM SERPL-SCNC: 3.7 MMOL/L (ref 3.5–5.1)
PROT SERPL-MCNC: 7.4 G/DL (ref 6–8.4)
PROT UR QL STRIP: ABNORMAL
RBC # BLD AUTO: 2.97 M/UL (ref 4–5.4)
RBC #/AREA URNS HPF: 5 /HPF (ref 0–4)
SODIUM SERPL-SCNC: 140 MMOL/L (ref 136–145)
SP GR UR STRIP: >=1.03 (ref 1–1.03)
SQUAMOUS #/AREA URNS HPF: 5 /HPF
URN SPEC COLLECT METH UR: ABNORMAL
UROBILINOGEN UR STRIP-ACNC: 1 EU/DL
WBC # BLD AUTO: 2.43 K/UL (ref 3.9–12.7)
WBC #/AREA URNS HPF: 10 /HPF (ref 0–5)

## 2024-01-03 PROCEDURE — 80053 COMPREHEN METABOLIC PANEL: CPT | Performed by: OBSTETRICS & GYNECOLOGY

## 2024-01-03 PROCEDURE — 81000 URINALYSIS NONAUTO W/SCOPE: CPT | Performed by: OBSTETRICS & GYNECOLOGY

## 2024-01-03 PROCEDURE — 85027 COMPLETE CBC AUTOMATED: CPT | Performed by: OBSTETRICS & GYNECOLOGY

## 2024-01-03 PROCEDURE — 86304 IMMUNOASSAY TUMOR CA 125: CPT | Performed by: OBSTETRICS & GYNECOLOGY

## 2024-01-03 PROCEDURE — 36415 COLL VENOUS BLD VENIPUNCTURE: CPT | Performed by: OBSTETRICS & GYNECOLOGY

## 2024-01-05 ENCOUNTER — INFUSION (OUTPATIENT)
Dept: INFUSION THERAPY | Facility: HOSPITAL | Age: 69
End: 2024-01-05
Payer: MEDICARE

## 2024-01-05 VITALS
TEMPERATURE: 98 F | HEART RATE: 98 BPM | SYSTOLIC BLOOD PRESSURE: 148 MMHG | WEIGHT: 111.56 LBS | DIASTOLIC BLOOD PRESSURE: 66 MMHG | BODY MASS INDEX: 17.93 KG/M2 | HEIGHT: 66 IN | RESPIRATION RATE: 16 BRPM

## 2024-01-05 DIAGNOSIS — C56.3 MALIGNANT NEOPLASM OF BOTH OVARIES: Primary | ICD-10-CM

## 2024-01-05 PROCEDURE — 63600175 PHARM REV CODE 636 W HCPCS: Performed by: OBSTETRICS & GYNECOLOGY

## 2024-01-05 PROCEDURE — 96417 CHEMO IV INFUS EACH ADDL SEQ: CPT

## 2024-01-05 PROCEDURE — 96375 TX/PRO/DX INJ NEW DRUG ADDON: CPT

## 2024-01-05 PROCEDURE — 96413 CHEMO IV INFUSION 1 HR: CPT

## 2024-01-05 PROCEDURE — 25000003 PHARM REV CODE 250: Performed by: OBSTETRICS & GYNECOLOGY

## 2024-01-05 PROCEDURE — 96367 TX/PROPH/DG ADDL SEQ IV INF: CPT

## 2024-01-05 RX ORDER — EPINEPHRINE 0.3 MG/.3ML
0.3 INJECTION SUBCUTANEOUS ONCE AS NEEDED
Status: DISCONTINUED | OUTPATIENT
Start: 2024-01-05 | End: 2024-01-05 | Stop reason: HOSPADM

## 2024-01-05 RX ORDER — HEPARIN 100 UNIT/ML
500 SYRINGE INTRAVENOUS
Status: DISCONTINUED | OUTPATIENT
Start: 2024-01-05 | End: 2024-01-05 | Stop reason: HOSPADM

## 2024-01-05 RX ORDER — DIPHENHYDRAMINE HYDROCHLORIDE 50 MG/ML
50 INJECTION, SOLUTION INTRAMUSCULAR; INTRAVENOUS ONCE AS NEEDED
Status: DISCONTINUED | OUTPATIENT
Start: 2024-01-05 | End: 2024-01-05 | Stop reason: HOSPADM

## 2024-01-05 RX ORDER — EPINEPHRINE 0.3 MG/.3ML
0.3 INJECTION SUBCUTANEOUS ONCE AS NEEDED
Status: CANCELLED | OUTPATIENT
Start: 2024-01-05

## 2024-01-05 RX ORDER — SODIUM CHLORIDE 0.9 % (FLUSH) 0.9 %
10 SYRINGE (ML) INJECTION
Status: DISCONTINUED | OUTPATIENT
Start: 2024-01-05 | End: 2024-01-05 | Stop reason: HOSPADM

## 2024-01-05 RX ORDER — DIPHENHYDRAMINE HYDROCHLORIDE 50 MG/ML
50 INJECTION, SOLUTION INTRAMUSCULAR; INTRAVENOUS ONCE AS NEEDED
Status: CANCELLED | OUTPATIENT
Start: 2024-01-05

## 2024-01-05 RX ORDER — HEPARIN 100 UNIT/ML
500 SYRINGE INTRAVENOUS
Status: CANCELLED | OUTPATIENT
Start: 2024-01-05

## 2024-01-05 RX ORDER — SODIUM CHLORIDE 0.9 % (FLUSH) 0.9 %
10 SYRINGE (ML) INJECTION
Status: CANCELLED | OUTPATIENT
Start: 2024-01-05

## 2024-01-05 RX ADMIN — DEXAMETHASONE SODIUM PHOSPHATE 0.25 MG: 4 INJECTION, SOLUTION INTRA-ARTICULAR; INTRALESIONAL; INTRAMUSCULAR; INTRAVENOUS; SOFT TISSUE at 11:01

## 2024-01-05 RX ADMIN — APREPITANT 130 MG: 130 INJECTION, EMULSION INTRAVENOUS at 11:01

## 2024-01-05 RX ADMIN — BEVACIZUMAB-AWWB 790 MG: 400 INJECTION, SOLUTION INTRAVENOUS at 10:01

## 2024-01-05 RX ADMIN — GEMCITABINE HYDROCHLORIDE 1060 MG: 1 INJECTION, SOLUTION INTRAVENOUS at 11:01

## 2024-01-05 RX ADMIN — CARBOPLATIN 315 MG: 10 INJECTION, SOLUTION INTRAVENOUS at 12:01

## 2024-01-05 NOTE — PLAN OF CARE
Problem: Adult Inpatient Plan of Care  Goal: Plan of Care Review  Outcome: Ongoing, Progressing   Patient tolerated Mvasi/gemzar/carbo infusions today.NAD noted. VSS. PAC + blood return upon deaccess. Discharged home

## 2024-01-10 ENCOUNTER — LAB VISIT (OUTPATIENT)
Dept: LAB | Facility: OTHER | Age: 69
End: 2024-01-10
Attending: OBSTETRICS & GYNECOLOGY
Payer: MEDICARE

## 2024-01-10 DIAGNOSIS — C56.3 MALIGNANT NEOPLASM OF BOTH OVARIES: ICD-10-CM

## 2024-01-10 LAB
ALBUMIN SERPL BCP-MCNC: 4 G/DL (ref 3.5–5.2)
ALP SERPL-CCNC: 105 U/L (ref 55–135)
ALT SERPL W/O P-5'-P-CCNC: 17 U/L (ref 10–44)
ANION GAP SERPL CALC-SCNC: 11 MMOL/L (ref 8–16)
AST SERPL-CCNC: 25 U/L (ref 10–40)
BILIRUB SERPL-MCNC: 0.5 MG/DL (ref 0.1–1)
BUN SERPL-MCNC: 12 MG/DL (ref 8–23)
CALCIUM SERPL-MCNC: 9.7 MG/DL (ref 8.7–10.5)
CHLORIDE SERPL-SCNC: 101 MMOL/L (ref 95–110)
CO2 SERPL-SCNC: 24 MMOL/L (ref 23–29)
CREAT SERPL-MCNC: 0.8 MG/DL (ref 0.5–1.4)
ERYTHROCYTE [DISTWIDTH] IN BLOOD BY AUTOMATED COUNT: 14.6 % (ref 11.5–14.5)
EST. GFR  (NO RACE VARIABLE): >60 ML/MIN/1.73 M^2
GLUCOSE SERPL-MCNC: 90 MG/DL (ref 70–110)
HCT VFR BLD AUTO: 31.4 % (ref 37–48.5)
HGB BLD-MCNC: 10.2 G/DL (ref 12–16)
IMM GRANULOCYTES # BLD AUTO: 0.01 K/UL (ref 0–0.04)
MCH RBC QN AUTO: 32.2 PG (ref 27–31)
MCHC RBC AUTO-ENTMCNC: 32.5 G/DL (ref 32–36)
MCV RBC AUTO: 99 FL (ref 82–98)
NEUTROPHILS # BLD AUTO: 1.9 K/UL (ref 1.8–7.7)
PLATELET # BLD AUTO: 191 K/UL (ref 150–450)
PMV BLD AUTO: 8.2 FL (ref 9.2–12.9)
POTASSIUM SERPL-SCNC: 4.3 MMOL/L (ref 3.5–5.1)
PROT SERPL-MCNC: 7.6 G/DL (ref 6–8.4)
RBC # BLD AUTO: 3.17 M/UL (ref 4–5.4)
SODIUM SERPL-SCNC: 136 MMOL/L (ref 136–145)
WBC # BLD AUTO: 2.69 K/UL (ref 3.9–12.7)

## 2024-01-10 PROCEDURE — 36415 COLL VENOUS BLD VENIPUNCTURE: CPT | Performed by: OBSTETRICS & GYNECOLOGY

## 2024-01-10 PROCEDURE — 85027 COMPLETE CBC AUTOMATED: CPT | Performed by: OBSTETRICS & GYNECOLOGY

## 2024-01-10 PROCEDURE — 80053 COMPREHEN METABOLIC PANEL: CPT | Performed by: OBSTETRICS & GYNECOLOGY

## 2024-01-10 RX ORDER — ONDANSETRON 2 MG/ML
8 INJECTION INTRAMUSCULAR; INTRAVENOUS
Status: CANCELLED | OUTPATIENT
Start: 2024-01-12

## 2024-01-10 RX ORDER — DIPHENHYDRAMINE HYDROCHLORIDE 50 MG/ML
50 INJECTION, SOLUTION INTRAMUSCULAR; INTRAVENOUS ONCE AS NEEDED
Status: CANCELLED | OUTPATIENT
Start: 2024-01-12

## 2024-01-10 RX ORDER — EPINEPHRINE 0.3 MG/.3ML
0.3 INJECTION SUBCUTANEOUS ONCE AS NEEDED
Status: CANCELLED | OUTPATIENT
Start: 2024-01-12

## 2024-01-10 RX ORDER — HEPARIN 100 UNIT/ML
500 SYRINGE INTRAVENOUS
Status: CANCELLED | OUTPATIENT
Start: 2024-01-12

## 2024-01-10 RX ORDER — SODIUM CHLORIDE 0.9 % (FLUSH) 0.9 %
10 SYRINGE (ML) INJECTION
Status: CANCELLED | OUTPATIENT
Start: 2024-01-12

## 2024-01-11 ENCOUNTER — PATIENT MESSAGE (OUTPATIENT)
Dept: GYNECOLOGIC ONCOLOGY | Facility: CLINIC | Age: 69
End: 2024-01-11
Payer: MEDICARE

## 2024-01-12 ENCOUNTER — INFUSION (OUTPATIENT)
Dept: INFUSION THERAPY | Facility: HOSPITAL | Age: 69
End: 2024-01-12
Payer: MEDICARE

## 2024-01-12 ENCOUNTER — PATIENT MESSAGE (OUTPATIENT)
Dept: GYNECOLOGIC ONCOLOGY | Facility: CLINIC | Age: 69
End: 2024-01-12
Payer: MEDICARE

## 2024-01-12 VITALS
HEART RATE: 109 BPM | BODY MASS INDEX: 17.51 KG/M2 | DIASTOLIC BLOOD PRESSURE: 75 MMHG | SYSTOLIC BLOOD PRESSURE: 139 MMHG | TEMPERATURE: 98 F | HEIGHT: 66 IN | RESPIRATION RATE: 18 BRPM | OXYGEN SATURATION: 97 % | WEIGHT: 108.94 LBS

## 2024-01-12 DIAGNOSIS — C56.3 MALIGNANT NEOPLASM OF BOTH OVARIES: Primary | ICD-10-CM

## 2024-01-12 PROCEDURE — 96413 CHEMO IV INFUSION 1 HR: CPT

## 2024-01-12 PROCEDURE — 63600175 PHARM REV CODE 636 W HCPCS: Mod: JG | Performed by: OBSTETRICS & GYNECOLOGY

## 2024-01-12 PROCEDURE — A4216 STERILE WATER/SALINE, 10 ML: HCPCS | Performed by: OBSTETRICS & GYNECOLOGY

## 2024-01-12 PROCEDURE — 25000003 PHARM REV CODE 250: Performed by: OBSTETRICS & GYNECOLOGY

## 2024-01-12 PROCEDURE — 96375 TX/PRO/DX INJ NEW DRUG ADDON: CPT

## 2024-01-12 RX ORDER — EPINEPHRINE 0.3 MG/.3ML
0.3 INJECTION SUBCUTANEOUS ONCE AS NEEDED
Status: DISCONTINUED | OUTPATIENT
Start: 2024-01-12 | End: 2024-01-12 | Stop reason: HOSPADM

## 2024-01-12 RX ORDER — SODIUM CHLORIDE 0.9 % (FLUSH) 0.9 %
10 SYRINGE (ML) INJECTION
Status: DISCONTINUED | OUTPATIENT
Start: 2024-01-12 | End: 2024-01-12 | Stop reason: HOSPADM

## 2024-01-12 RX ORDER — DIPHENHYDRAMINE HYDROCHLORIDE 50 MG/ML
50 INJECTION, SOLUTION INTRAMUSCULAR; INTRAVENOUS ONCE AS NEEDED
Status: DISCONTINUED | OUTPATIENT
Start: 2024-01-12 | End: 2024-01-12 | Stop reason: HOSPADM

## 2024-01-12 RX ORDER — ONDANSETRON 2 MG/ML
8 INJECTION INTRAMUSCULAR; INTRAVENOUS
Status: COMPLETED | OUTPATIENT
Start: 2024-01-12 | End: 2024-01-12

## 2024-01-12 RX ORDER — HEPARIN 100 UNIT/ML
500 SYRINGE INTRAVENOUS
Status: DISCONTINUED | OUTPATIENT
Start: 2024-01-12 | End: 2024-01-12 | Stop reason: HOSPADM

## 2024-01-12 RX ADMIN — HEPARIN 500 UNITS: 100 SYRINGE at 10:01

## 2024-01-12 RX ADMIN — SODIUM CHLORIDE: 9 INJECTION, SOLUTION INTRAVENOUS at 08:01

## 2024-01-12 RX ADMIN — GEMCITABINE 1060 MG: 100 INJECTION, SOLUTION INTRAVENOUS at 09:01

## 2024-01-12 RX ADMIN — ONDANSETRON 8 MG: 2 INJECTION INTRAMUSCULAR; INTRAVENOUS at 08:01

## 2024-01-12 RX ADMIN — Medication 10 ML: at 10:01

## 2024-01-12 NOTE — PLAN OF CARE
1020  Patient completed Gemzar infusion & subsequent flush.  Tolerated well.  Port deaccessed, flushed, blood return noted, heparin locked.  Patient ambulated off floor accompanied by .  RTC 1/13/24

## 2024-01-12 NOTE — PLAN OF CARE
0800  Patient seated in chair accompanied by , VSS, assessment done.  Port accessed, flushed, blood return noted, started NS @ 50 cc/hr KVO while waiting for Gemzar from pharmacy.

## 2024-01-18 ENCOUNTER — PATIENT MESSAGE (OUTPATIENT)
Dept: GYNECOLOGIC ONCOLOGY | Facility: CLINIC | Age: 69
End: 2024-01-18
Payer: MEDICARE

## 2024-01-23 ENCOUNTER — PATIENT MESSAGE (OUTPATIENT)
Dept: GYNECOLOGIC ONCOLOGY | Facility: CLINIC | Age: 69
End: 2024-01-23
Payer: MEDICARE

## 2024-01-25 ENCOUNTER — LAB VISIT (OUTPATIENT)
Dept: LAB | Facility: OTHER | Age: 69
End: 2024-01-25
Attending: OBSTETRICS & GYNECOLOGY
Payer: MEDICARE

## 2024-01-25 ENCOUNTER — PATIENT MESSAGE (OUTPATIENT)
Dept: GYNECOLOGIC ONCOLOGY | Facility: CLINIC | Age: 69
End: 2024-01-25

## 2024-01-25 ENCOUNTER — DOCUMENTATION ONLY (OUTPATIENT)
Dept: GYNECOLOGIC ONCOLOGY | Facility: CLINIC | Age: 69
End: 2024-01-25

## 2024-01-25 ENCOUNTER — OFFICE VISIT (OUTPATIENT)
Dept: GYNECOLOGIC ONCOLOGY | Facility: CLINIC | Age: 69
End: 2024-01-25
Payer: MEDICARE

## 2024-01-25 VITALS
DIASTOLIC BLOOD PRESSURE: 71 MMHG | BODY MASS INDEX: 17.83 KG/M2 | HEIGHT: 65 IN | SYSTOLIC BLOOD PRESSURE: 170 MMHG | HEART RATE: 88 BPM | WEIGHT: 107 LBS

## 2024-01-25 DIAGNOSIS — Z51.11 ENCOUNTER FOR ANTINEOPLASTIC CHEMOTHERAPY: ICD-10-CM

## 2024-01-25 DIAGNOSIS — R91.1 SOLITARY PULMONARY NODULE: ICD-10-CM

## 2024-01-25 DIAGNOSIS — C56.3 MALIGNANT NEOPLASM OF BOTH OVARIES: Primary | ICD-10-CM

## 2024-01-25 DIAGNOSIS — C56.3 MALIGNANT NEOPLASM OF BOTH OVARIES: ICD-10-CM

## 2024-01-25 LAB
ALBUMIN SERPL BCP-MCNC: 3.9 G/DL (ref 3.5–5.2)
ALP SERPL-CCNC: 112 U/L (ref 55–135)
ALT SERPL W/O P-5'-P-CCNC: 16 U/L (ref 10–44)
ANION GAP SERPL CALC-SCNC: 11 MMOL/L (ref 8–16)
AST SERPL-CCNC: 20 U/L (ref 10–40)
BACTERIA #/AREA URNS HPF: ABNORMAL /HPF
BILIRUB SERPL-MCNC: 0.4 MG/DL (ref 0.1–1)
BILIRUB UR QL STRIP: NEGATIVE
BUN SERPL-MCNC: 12 MG/DL (ref 8–23)
CALCIUM SERPL-MCNC: 9.7 MG/DL (ref 8.7–10.5)
CANCER AG125 SERPL-ACNC: 1236 U/ML (ref 0–30)
CHLORIDE SERPL-SCNC: 101 MMOL/L (ref 95–110)
CLARITY UR: CLEAR
CO2 SERPL-SCNC: 25 MMOL/L (ref 23–29)
COLOR UR: YELLOW
CREAT SERPL-MCNC: 0.9 MG/DL (ref 0.5–1.4)
ERYTHROCYTE [DISTWIDTH] IN BLOOD BY AUTOMATED COUNT: 15.9 % (ref 11.5–14.5)
EST. GFR  (NO RACE VARIABLE): >60 ML/MIN/1.73 M^2
GLUCOSE SERPL-MCNC: 123 MG/DL (ref 70–110)
GLUCOSE UR QL STRIP: NEGATIVE
HCT VFR BLD AUTO: 25.9 % (ref 37–48.5)
HGB BLD-MCNC: 8.5 G/DL (ref 12–16)
HGB UR QL STRIP: NEGATIVE
HYALINE CASTS #/AREA URNS LPF: 1 /LPF
IMM GRANULOCYTES # BLD AUTO: 0.01 K/UL (ref 0–0.04)
KETONES UR QL STRIP: NEGATIVE
LEUKOCYTE ESTERASE UR QL STRIP: ABNORMAL
MCH RBC QN AUTO: 31.8 PG (ref 27–31)
MCHC RBC AUTO-ENTMCNC: 32.8 G/DL (ref 32–36)
MCV RBC AUTO: 97 FL (ref 82–98)
MICROSCOPIC COMMENT: ABNORMAL
NEUTROPHILS # BLD AUTO: 1.8 K/UL (ref 1.8–7.7)
NITRITE UR QL STRIP: NEGATIVE
PH UR STRIP: 6 [PH] (ref 5–8)
PLATELET # BLD AUTO: 146 K/UL (ref 150–450)
PMV BLD AUTO: 9.2 FL (ref 9.2–12.9)
POTASSIUM SERPL-SCNC: 3.8 MMOL/L (ref 3.5–5.1)
PROT SERPL-MCNC: 7.4 G/DL (ref 6–8.4)
PROT UR QL STRIP: ABNORMAL
RBC # BLD AUTO: 2.67 M/UL (ref 4–5.4)
RBC #/AREA URNS HPF: 1 /HPF (ref 0–4)
SODIUM SERPL-SCNC: 137 MMOL/L (ref 136–145)
SP GR UR STRIP: 1.02 (ref 1–1.03)
SQUAMOUS #/AREA URNS HPF: 4 /HPF
URN SPEC COLLECT METH UR: ABNORMAL
UROBILINOGEN UR STRIP-ACNC: ABNORMAL EU/DL
WBC # BLD AUTO: 2.81 K/UL (ref 3.9–12.7)
WBC #/AREA URNS HPF: 10 /HPF (ref 0–5)

## 2024-01-25 PROCEDURE — 85027 COMPLETE CBC AUTOMATED: CPT | Performed by: OBSTETRICS & GYNECOLOGY

## 2024-01-25 PROCEDURE — 81000 URINALYSIS NONAUTO W/SCOPE: CPT | Performed by: OBSTETRICS & GYNECOLOGY

## 2024-01-25 PROCEDURE — 86304 IMMUNOASSAY TUMOR CA 125: CPT | Performed by: OBSTETRICS & GYNECOLOGY

## 2024-01-25 PROCEDURE — 99214 OFFICE O/P EST MOD 30 MIN: CPT | Mod: PBBFAC | Performed by: OBSTETRICS & GYNECOLOGY

## 2024-01-25 PROCEDURE — 36415 COLL VENOUS BLD VENIPUNCTURE: CPT | Performed by: OBSTETRICS & GYNECOLOGY

## 2024-01-25 PROCEDURE — 80053 COMPREHEN METABOLIC PANEL: CPT | Performed by: OBSTETRICS & GYNECOLOGY

## 2024-01-25 PROCEDURE — 99999 PR PBB SHADOW E&M-EST. PATIENT-LVL IV: CPT | Mod: PBBFAC,,, | Performed by: OBSTETRICS & GYNECOLOGY

## 2024-01-25 PROCEDURE — 99215 OFFICE O/P EST HI 40 MIN: CPT | Mod: S$PBB,,, | Performed by: OBSTETRICS & GYNECOLOGY

## 2024-01-25 RX ORDER — DIPHENHYDRAMINE HCL 50 MG
CAPSULE ORAL
Qty: 1 CAPSULE | Refills: 0 | Status: SHIPPED | OUTPATIENT
Start: 2024-01-25 | End: 2024-01-25

## 2024-01-25 RX ORDER — PREDNISONE 50 MG/1
TABLET ORAL
Qty: 3 TABLET | Refills: 0 | Status: SHIPPED | OUTPATIENT
Start: 2024-01-25 | End: 2024-01-25

## 2024-01-25 NOTE — PROGRESS NOTES
Subjective:      Patient ID: Najma Hung is a 68 y.o. female.    Chief Complaint: Chemotherapy    Treatment History  Stage IIIC high-grade serous ovarian cancer  Xlap/BSO/Omentectomy/Pelvic and PA nodes/Peritoneal stripping and debulking 6/20 by Dr. Olivier  T/C x 2, then Carbo x 3 due to patient intolerance of treatment completed 1/28/21  Elected to not pursue maintenance PARPi  CARIS testing negative  Germline normal, CDX positive  Recurrence documented 5/22, received Carbo + neulasta x 1  No treatment until 11/22, when multifocal progression documented on PET  Doxil/Carbo x 4 completed 4/23  Delayed PARPi therapy until starting Lynparza 200 BID 6/23  Progression of disease with recent hospitalization x 2 for partial SBO    HPI  Her today after completing 5 cycles of treatment.  Received C5D8 on 1/12/24.  Due for scans prior to next cycle.  CA-125 pending today but has fluctuated but overall stable.  Review of Systems   Constitutional:  Negative for activity change, appetite change, chills, fatigue and fever.   HENT:  Negative for hearing loss, mouth sores, nosebleeds, sore throat and tinnitus.    Eyes:  Negative for visual disturbance.   Respiratory:  Negative for cough, chest tightness, shortness of breath and wheezing.    Cardiovascular:  Negative for chest pain and leg swelling.   Gastrointestinal:  Negative for abdominal distention, abdominal pain, blood in stool, constipation, diarrhea, nausea and vomiting.   Genitourinary:  Negative for dysuria, flank pain, frequency, hematuria, pelvic pain, vaginal bleeding, vaginal discharge and vaginal pain.   Musculoskeletal:  Negative for arthralgias and back pain.   Skin:  Negative for rash.   Neurological:  Negative for dizziness, seizures, syncope, weakness and numbness.   Hematological:  Does not bruise/bleed easily.   Psychiatric/Behavioral:  Negative for confusion and sleep disturbance. The patient is not nervous/anxious.        Objective:   Physical  Exam:   Constitutional: She is oriented to person, place, and time. She appears well-developed and well-nourished. No distress.    HENT:   Head: Normocephalic and atraumatic.    Eyes: No scleral icterus.     Cardiovascular:       Exam reveals no cyanosis and no edema.        Pulmonary/Chest: Effort normal. No respiratory distress. She exhibits no tenderness.        Abdominal: Soft. She exhibits no distension, no fluid wave and no mass. There is no abdominal tenderness. There is no rebound and no guarding. No hernia.             Musculoskeletal: Normal range of motion and moves all extremeties. No edema.      Lymphadenopathy:     She has no cervical adenopathy.    Neurological: She is alert and oriented to person, place, and time.    Skin: Skin is warm and dry. No cyanosis. No pallor.    Psychiatric: She has a normal mood and affect. Her behavior is normal.       Assessment:     1. Malignant neoplasm of both ovaries    2. Encounter for antineoplastic chemotherapy        Plan:       Long discussion with her and her .  She is having some treatment related side effects so I offered to f/u on her CA-125 and not treat tomorrow if it were rising.  I have placed orders for PET regardless to assess her disease response.  Will call with plan.  Labs are pending.

## 2024-01-26 ENCOUNTER — PATIENT MESSAGE (OUTPATIENT)
Dept: GYNECOLOGIC ONCOLOGY | Facility: CLINIC | Age: 69
End: 2024-01-26
Payer: MEDICARE

## 2024-01-26 RX ORDER — EPINEPHRINE 0.3 MG/.3ML
0.3 INJECTION SUBCUTANEOUS ONCE AS NEEDED
Status: CANCELLED | OUTPATIENT
Start: 2024-01-26

## 2024-01-26 RX ORDER — SODIUM CHLORIDE 0.9 % (FLUSH) 0.9 %
10 SYRINGE (ML) INJECTION
Status: CANCELLED | OUTPATIENT
Start: 2024-01-26

## 2024-01-26 RX ORDER — HEPARIN 100 UNIT/ML
500 SYRINGE INTRAVENOUS
Status: CANCELLED | OUTPATIENT
Start: 2024-01-26

## 2024-01-26 RX ORDER — DIPHENHYDRAMINE HYDROCHLORIDE 50 MG/ML
50 INJECTION INTRAMUSCULAR; INTRAVENOUS ONCE AS NEEDED
Status: CANCELLED | OUTPATIENT
Start: 2024-01-26

## 2024-02-01 ENCOUNTER — HOSPITAL ENCOUNTER (OUTPATIENT)
Dept: RADIOLOGY | Facility: HOSPITAL | Age: 69
Discharge: HOME | End: 2024-02-01
Attending: OBSTETRICS & GYNECOLOGY
Payer: MEDICARE

## 2024-02-01 DIAGNOSIS — C56.3 MALIGNANT NEOPLASM OF BOTH OVARIES: ICD-10-CM

## 2024-02-01 LAB — POCT GLUCOSE: 93 MG/DL (ref 70–110)

## 2024-02-01 PROCEDURE — 78815 PET IMAGE W/CT SKULL-THIGH: CPT | Mod: 26,PS,, | Performed by: STUDENT IN AN ORGANIZED HEALTH CARE EDUCATION/TRAINING PROGRAM

## 2024-02-01 PROCEDURE — A9552 F18 FDG: HCPCS

## 2024-02-01 PROCEDURE — 78815 PET IMAGE W/CT SKULL-THIGH: CPT | Mod: TC

## 2024-02-05 ENCOUNTER — PATIENT MESSAGE (OUTPATIENT)
Dept: GYNECOLOGIC ONCOLOGY | Facility: CLINIC | Age: 69
End: 2024-02-05
Payer: MEDICARE

## 2024-02-08 ENCOUNTER — TELEPHONE (OUTPATIENT)
Dept: GYNECOLOGIC ONCOLOGY | Facility: CLINIC | Age: 69
End: 2024-02-08
Payer: MEDICARE

## 2024-02-08 NOTE — TELEPHONE ENCOUNTER
----- Message from Doris Willson NP sent at 2/8/2024  3:02 PM CST -----  Hey she has progression of her disease and we presented her at tumor board yesterday. This is something he has to discuss with her. I can't review it.    If you could just give her a call and let her know he'll contact her. Copying him so he knows she's calling  Doris  ----- Message -----  From: Marita Moser RN  Sent: 2/8/2024   2:32 PM CST  To: Doris Willson NP      ----- Message -----  From: Too Carodzo  Sent: 2/8/2024   2:27 PM CST  To: Rema Allan Staff     Name of Who is Calling:     What is the request in detail:  patient request call back in reference to discuss  pet scan results Please contact to further discuss and advise      Can the clinic reply by [facility]:     What Number to Call Back if not in [facility]:   [phone]

## 2024-02-13 ENCOUNTER — PATIENT MESSAGE (OUTPATIENT)
Dept: GYNECOLOGIC ONCOLOGY | Facility: CLINIC | Age: 69
End: 2024-02-13
Payer: MEDICARE

## 2024-02-14 NOTE — TELEPHONE ENCOUNTER
"Patient is upset that there was no quick response to her message. She wants to know what the plan. Listened/provided emotional support. She wanted to setup an appointment. Next available is on Monday 19 at 1430. Explained that I will message Dr. Hodgson to call her tomorrow. She asked to simply reply letting her know "what is going on step by step". Questions asked and answered.   "

## 2024-02-15 ENCOUNTER — TELEPHONE (OUTPATIENT)
Dept: GYNECOLOGIC ONCOLOGY | Facility: CLINIC | Age: 69
End: 2024-02-15
Payer: MEDICARE

## 2024-02-15 NOTE — TELEPHONE ENCOUNTER
Return call left voicemail. ----- Message from Marita Moser RN sent at 2/14/2024  4:32 PM CST -----  Regarding: appt  I know we discussed this.  She is asking for you.     Marita  ----- Message -----  From: Too Cardozo  Sent: 2/14/2024   2:58 PM CST  To: Rema Allan Staff     Name of Who is Calling:     What is the request in detail:  patient request call back from herlinda  for concerns  Please contact to further discuss and advise      Can the clinic reply by [facility]:     [facility]:   [phone]

## 2024-02-16 DIAGNOSIS — C56.9 MALIGNANT NEOPLASM OF OVARY, UNSPECIFIED LATERALITY: ICD-10-CM

## 2024-02-16 RX ORDER — PROMETHAZINE HYDROCHLORIDE 12.5 MG/1
12.5 TABLET ORAL EVERY 6 HOURS PRN
Qty: 15 TABLET | Refills: 0 | Status: SHIPPED | OUTPATIENT
Start: 2024-02-16

## 2024-02-19 ENCOUNTER — OFFICE VISIT (OUTPATIENT)
Dept: GYNECOLOGIC ONCOLOGY | Facility: CLINIC | Age: 69
End: 2024-02-19
Payer: MEDICARE

## 2024-02-19 VITALS
WEIGHT: 107 LBS | SYSTOLIC BLOOD PRESSURE: 166 MMHG | HEART RATE: 92 BPM | DIASTOLIC BLOOD PRESSURE: 77 MMHG | BODY MASS INDEX: 17.67 KG/M2

## 2024-02-19 DIAGNOSIS — Z51.11 ENCOUNTER FOR ANTINEOPLASTIC CHEMOTHERAPY: ICD-10-CM

## 2024-02-19 DIAGNOSIS — C56.3 MALIGNANT NEOPLASM OF BOTH OVARIES: Primary | ICD-10-CM

## 2024-02-19 PROCEDURE — 99213 OFFICE O/P EST LOW 20 MIN: CPT | Mod: PBBFAC | Performed by: OBSTETRICS & GYNECOLOGY

## 2024-02-19 PROCEDURE — 99215 OFFICE O/P EST HI 40 MIN: CPT | Mod: S$PBB,,, | Performed by: OBSTETRICS & GYNECOLOGY

## 2024-02-19 PROCEDURE — 99999 PR PBB SHADOW E&M-EST. PATIENT-LVL III: CPT | Mod: PBBFAC,,, | Performed by: OBSTETRICS & GYNECOLOGY

## 2024-02-19 RX ORDER — CYCLOPHOSPHAMIDE 50 MG/1
50 CAPSULE ORAL DAILY
Qty: 30 CAPSULE | Refills: 3 | Status: SHIPPED | OUTPATIENT
Start: 2024-02-19 | End: 2024-03-04

## 2024-02-19 NOTE — PROGRESS NOTES
Subjective:      Patient ID: Najma Hung is a 68 y.o. female.    Chief Complaint: Follow-up    Treatment History  Stage IIIC high-grade serous ovarian cancer  Xlap/BSO/Omentectomy/Pelvic and PA nodes/Peritoneal stripping and debulking 6/20 by Dr. Olivier  T/C x 2, then Carbo x 3 due to patient intolerance of treatment completed 1/28/21  Elected to not pursue maintenance PARPi  CARIS testing negative  Germline normal, CDX positive  Recurrence documented 5/22, received Carbo + neulasta x 1  No treatment until 11/22, when multifocal progression documented on PET  Doxil/Carbo x 4 completed 4/23  Delayed PARPi therapy until starting Lynparza 200 BID 6/23  Progression of disease with recent hospitalization x 2 for partial SBO    HPI  Her today for f/u.  We stopped her treatment due to rising CA-125 and treatment related side effects.  PET was done that showed multifocal disease progression.  She reports pain at her abdominal wall implant site and occasional uncontrollable diarrhea. This does at times respond to Immodium.  Review of Systems   Constitutional:  Negative for activity change, appetite change, chills, fatigue and fever.   HENT:  Negative for hearing loss, mouth sores, nosebleeds, sore throat and tinnitus.    Eyes:  Negative for visual disturbance.   Respiratory:  Negative for cough, chest tightness, shortness of breath and wheezing.    Cardiovascular:  Negative for chest pain and leg swelling.   Gastrointestinal:  Negative for abdominal distention, abdominal pain, blood in stool, constipation, diarrhea, nausea and vomiting.   Genitourinary:  Negative for dysuria, flank pain, frequency, hematuria, pelvic pain, vaginal bleeding, vaginal discharge and vaginal pain.   Musculoskeletal:  Negative for arthralgias and back pain.   Skin:  Negative for rash.   Neurological:  Negative for dizziness, seizures, syncope, weakness and numbness.   Hematological:  Does not bruise/bleed easily.   Psychiatric/Behavioral:   Negative for confusion and sleep disturbance. The patient is not nervous/anxious.        Objective:   Physical Exam:   Constitutional: She is oriented to person, place, and time. She appears well-developed and well-nourished. No distress.    HENT:   Head: Normocephalic and atraumatic.    Eyes: No scleral icterus.     Cardiovascular:       Exam reveals no cyanosis and no edema.        Pulmonary/Chest: Effort normal. No respiratory distress. She exhibits no tenderness.        Abdominal: Soft. She exhibits no distension, no fluid wave and no mass. There is no abdominal tenderness. There is no rebound and no guarding. No hernia.             Musculoskeletal: Normal range of motion and moves all extremeties. No edema.      Lymphadenopathy:     She has no cervical adenopathy.    Neurological: She is alert and oriented to person, place, and time.    Skin: Skin is warm and dry. No cyanosis. No pallor.    Psychiatric: She has a normal mood and affect. Her behavior is normal.       Assessment:     1. Malignant neoplasm of both ovaries    2. Encounter for antineoplastic chemotherapy        Plan:       We discussed options as outlined at [facility].  Will reach out to palliative to establish contact as OP.  In addition, we discussed NFT, Avastin monotherapy, Combination Cytoxan/Avastin/Pembro, and folate receptor testing.  She previously had CARIS testing at an outside facility.  She would like to try the 3 drug regimen.  The R/B/I of chemotherapy were explained to the patient including alopecia, N/V, BM suppression, fatigue, need for transfusion and peripheral neuropathy, viscous perf, protenuria, and IO related side effects.  The patient voiced understanding, all questions were answered and consents were signed.

## 2024-02-19 NOTE — Clinical Note
Hey.  I know she was previously seen as an IP, but she has progressed and we are putting her on a palliative chemo regimen.  I think she would benefit from OP f/u.  Prefers VV if ok. Thanks so much.

## 2024-02-21 ENCOUNTER — PATIENT MESSAGE (OUTPATIENT)
Dept: GYNECOLOGIC ONCOLOGY | Facility: CLINIC | Age: 69
End: 2024-02-21
Payer: MEDICARE

## 2024-02-21 ENCOUNTER — LAB VISIT (OUTPATIENT)
Dept: LAB | Facility: OTHER | Age: 69
End: 2024-02-21
Attending: INTERNAL MEDICINE
Payer: MEDICARE

## 2024-02-21 DIAGNOSIS — C56.3 MALIGNANT NEOPLASM OF BOTH OVARIES: ICD-10-CM

## 2024-02-21 PROCEDURE — 87209 SMEAR COMPLEX STAIN: CPT | Performed by: OBSTETRICS & GYNECOLOGY

## 2024-02-22 ENCOUNTER — DOCUMENTATION ONLY (OUTPATIENT)
Dept: PALLIATIVE MEDICINE | Facility: OTHER | Age: 69
End: 2024-02-22
Payer: MEDICARE

## 2024-02-22 NOTE — PROGRESS NOTES
Spoke to patient via phone. Virtual appointment scheduled at 9 am next Tuesday. Questions answered about palliative medicine.  Emailed info about Integrative medicine services.  Emotional support given. Will continue to follow along and support.

## 2024-02-23 LAB — O+P STL MICRO: NORMAL

## 2024-02-27 ENCOUNTER — OFFICE VISIT (OUTPATIENT)
Dept: PALLIATIVE MEDICINE | Facility: CLINIC | Age: 69
End: 2024-02-27
Payer: MEDICARE

## 2024-02-27 ENCOUNTER — PATIENT MESSAGE (OUTPATIENT)
Dept: GYNECOLOGIC ONCOLOGY | Facility: CLINIC | Age: 69
End: 2024-02-27
Payer: MEDICARE

## 2024-02-27 ENCOUNTER — TELEPHONE (OUTPATIENT)
Dept: GYNECOLOGIC ONCOLOGY | Facility: CLINIC | Age: 69
End: 2024-02-27
Payer: MEDICARE

## 2024-02-27 DIAGNOSIS — K59.00 CONSTIPATION, UNSPECIFIED CONSTIPATION TYPE: ICD-10-CM

## 2024-02-27 DIAGNOSIS — C56.9 MALIGNANT NEOPLASM OF OVARY, UNSPECIFIED LATERALITY: ICD-10-CM

## 2024-02-27 DIAGNOSIS — G89.3 CANCER RELATED PAIN: ICD-10-CM

## 2024-02-27 DIAGNOSIS — R53.0 NEOPLASTIC (MALIGNANT) RELATED FATIGUE: ICD-10-CM

## 2024-02-27 DIAGNOSIS — F43.20 ADJUSTMENT DISORDER, UNSPECIFIED TYPE: ICD-10-CM

## 2024-02-27 DIAGNOSIS — Z71.89 ADVANCED CARE PLANNING/COUNSELING DISCUSSION: Primary | ICD-10-CM

## 2024-02-27 PROCEDURE — 99443 PR PHYSICIAN TELEPHONE EVALUATION 21-30 MIN: CPT | Mod: 95,,, | Performed by: FAMILY MEDICINE

## 2024-02-27 NOTE — PROGRESS NOTES
"Established Patient - Audio Only Telehealth Visit     The patient location is: home   The chief complaint leading to consultation is: goals of care/ advance care planning   Visit type: Virtual visit with audio only (telephone)  Total time spent with patient: 30 minutes      The reason for the audio only service rather than synchronous audio and video virtual visit was related to technical difficulties or patient preference/necessity.     Each patient to whom I provide medical services by telemedicine is:  (1) informed of the relationship between the physician and patient and the respective role of any other health care provider with respect to management of the patient; and (2) notified that they may decline to receive medical services by telemedicine and may withdraw from such care at any time. Patient verbally consented to receive this service via voice-only telephone call.    HPI: Mrs. Hung is a 68 y.o. female with ovarian cancer, managed per Dr. Hodgson. Of note, imaging has shown progression of disease and Mrs. Hung has been hospitalized twice for partial SBO.      Assessment and plan:      Malignant Neoplasm of ovary  - Patient originally diagnosed in 2020 with recurrence of disease in 2022.   - Managed by Dr. Hodgson, last seen on 2/19/24   - At the last appointment, Dr. Hodgson stopped treatment due to rising CA-125 and treatment side effects   - She is planning to start the 3 drug regimen. This will include an oral chemo drug, which she will take 1 week prior to IV infusion. She is unsure when she will start this. I will reach out to Dr. Hodgson office.   - 1/25/24 : 1236   - Patient was seen by me in the ER on 11/16/23. She was seen in the ED and found to have a partial small bowel obstruction   - Mrs. Hung reports she is struggling with "do I go through all of this" and "where do I draw the line". She reports she is having treatment side effects and she is worried about having more symptoms and " "becoming more debilitated. She is contemplating how does she want the rest of her time here to be spent.   - Mrs. Hung reports that Dr. Hodgson told her there is a 30% change of response to the drugs regimen. She further stated "that is not very good". I encouraged Mrs. Hung to ask Dr. Hodgson what prognosis would be with further treatment versus stopping further treatment.     Advanced care planning/ counseling discussion  - Patient is decisional and alone on today's audio visit  - Seen alongside Tatiana Spence   - ACP documents uploaded into EMR. Patient completed HCPOA, dated 6/17/2020 designating Cody Ferrara. Patient also has LW on file dated 6/17/2020.   - Goals: symptom management, attempting next regimen with oral chemo/ IV infusion to see if it halts disease and improves symptoms.   - Philosophy of palliative medicine reviewed with patient   - Discussed with patient about what is important to her. Patient spoke about spending time with her family, ensuring she is comfortable and focusing on quality of life.   - Patient turns to her , Cody, for support. She has been  to her  for 37 years. 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   - She has always enjoyed integrative medicine and was receiving body therapy at [facility], but she has been unable to go to the facility due to weakness/ debility.   - She is very in touch with the mind body spirit relationship. She reports she feels that body therapy is her anecdote to modern medicine.   - Mrs. Hung spoke at length that this is very difficult as she has not been through this before and her "life and world have gotten a lot smaller". She spoke further about how she turns to her mind/ body relationship and how she sees this challenge as a part of her spiritual journey.   - She is worried about getting to the points of "cancer taking over my body completely and I am a sitting duck". Emotional " "support provided.   - She spoke about her , Cody. He is very supportive of her and he is struggling with being her caregiver and how to keep his law firm afloat. Mrs. Hung reports this diagnosis has impacted a "change in our relationship".     Neoplastic related fatigue  - Discussed with patient about being as active as possible and maximizing pain relief with functionality   - Patient reports severe debility and fatigue. She was able to go to the store yesterday but this was very difficult for her.   - Patient reports she is now 105 pounds. We discussed her nutrition and she reports her "body is complicated" as she has several food intolerances and food allergies.     Diarrhea  - Patient reports explosive diarrhea for the past 2 months. She reports taking OTC Imodium.     [place]  Reviewed and Summarized:   11/21/23 Lorazepam 0.5 mg Disp: 40 for 13 days  9/21/23 Lorazepam 0.5 mg Disp: 60 for 30 days   12/1/22 Lorazepam 0.5 mg Disp: 10 for 2 days     We had a very transparent conversation with Mrs. Hung regarding her disease and that she is in charge to make the decision regarding continuing further treatment. I did encourage her to discuss with Dr. Hodgson regarding what her prognosis with further treatment would be like and what it would be like without treatment. We further spoke about how we are here to support her along this journey. She wants to attempt further treatment in hopes of improving symptoms. She did state that in the event her symptoms do not improve, then she does not feel that she would desire to continue cancer directed treatment. We did briefly discuss hospice. She validated the word hospice is scary. Emotional support provided. She validated that she has never been through this before, which is understandably a lot. We discussed home based palliative care as getting out of the home for appointments  is very difficult. She is interested in speaking to two home based palliative care " [employer] for more support at home. I was very transparent that without an in person visit, I can not prescribe medicine, so I recommended home based palliative care.     Goals of care: Seeking further treatment to shrink disease/ managing symptoms     Prognosis: Guarded     Follow up: Will ask two home based palliative care companies to meet with Mrs. Hung. She has our number for follow up and will reach out. I anticipate Mrs. Hung will enroll with home based palliative care so they can manage her symptoms from home.     This service was not originating from a related E/M service provided within the previous 7 days nor will  to an E/M service or procedure within the next 24 hours or my soonest available appointment.  Prevailing standard of care was able to be met in this audio-only visit.

## 2024-02-27 NOTE — TELEPHONE ENCOUNTER
"Spoke to Kacy at [facility] and was informed medication is pending auth and a "PA might be required." Kacy transferred me to Anika and I provided her with pt's Medicare ID. Anika stated pt would be contacted to "schedule delivery then it will go to review to evaluate if a PA is required." Clarified with Anika pt will be contacted to scheduled delivery prior to identifying if PA is needed. Anika confirmed nothing additional is required on provider's end and if a PA is required provider's office will receive communication of this request. Update provided to CORA Flores.    ----- Message from Cassy Gomez DNP sent at 2/27/2024  9:34 AM CST -----  Good morning Mariza,    I had an audio visit with Mrs. Hung this morning. She mentioned the plan to start an oral chemo drug that she will take prior to an IV infusion 1 week later. She is unsure when she will start this and the status on the drugs. Would you mind reaching out to her to help her navigate this?    Thanks!  Cassy"

## 2024-02-28 ENCOUNTER — PATIENT MESSAGE (OUTPATIENT)
Dept: GYNECOLOGIC ONCOLOGY | Facility: CLINIC | Age: 69
End: 2024-02-28
Payer: MEDICARE

## 2024-02-28 ENCOUNTER — DOCUMENTATION ONLY (OUTPATIENT)
Dept: PALLIATIVE MEDICINE | Facility: OTHER | Age: 69
End: 2024-02-28
Payer: MEDICARE

## 2024-02-28 DIAGNOSIS — C56.3 MALIGNANT NEOPLASM OF BOTH OVARIES: Primary | ICD-10-CM

## 2024-02-29 ENCOUNTER — PATIENT MESSAGE (OUTPATIENT)
Dept: GYNECOLOGIC ONCOLOGY | Facility: CLINIC | Age: 69
End: 2024-02-29
Payer: MEDICARE

## 2024-02-29 ENCOUNTER — TELEPHONE (OUTPATIENT)
Dept: GYNECOLOGIC ONCOLOGY | Facility: CLINIC | Age: 69
End: 2024-02-29
Payer: MEDICARE

## 2024-02-29 DIAGNOSIS — Z51.11 ENCOUNTER FOR ANTINEOPLASTIC CHEMOTHERAPY: Primary | ICD-10-CM

## 2024-02-29 DIAGNOSIS — C56.9 MALIGNANT NEOPLASM OF UNSPECIFIED OVARY: ICD-10-CM

## 2024-02-29 DIAGNOSIS — C56.3 MALIGNANT NEOPLASM OF BOTH OVARIES: Primary | ICD-10-CM

## 2024-02-29 NOTE — TELEPHONE ENCOUNTER
----- Message from Angela Reyes RN sent at 2/29/2024  1:22 PM CST -----  Regarding: RE: Appointment Access    ----- Message -----  From: Rupa Carter  Sent: 2/29/2024  11:11 AM CST  To: Rema Allan Staff; [facility] [place]  Subject: Appointment Access                                           Name of Who is Calling:  Najma Hung    Who Left The Message:  Najma Hung    Message Is For: Mateus      What is the request in detail:       Patient called stating she's returning the Office's call and would like you to please call again.   Please further advise.   Thank you      Reply by [facility]: YES      Preferred Call Back : [phone]

## 2024-02-29 NOTE — PROGRESS NOTES
"Established Patient - Audio Only Telehealth Visit     The patient location is: home   The chief complaint leading to consultation is: goals of care/ advance care planning   Visit type: Virtual visit with audio only (telephone)  Total time spent with patient: 30 minutes      The reason for the audio only service rather than synchronous audio and video virtual visit was related to technical difficulties or patient preference/necessity.     Each patient to whom I provide medical services by telemedicine is:  (1) informed of the relationship between the physician and patient and the respective role of any other health care provider with respect to management of the patient; and (2) notified that they may decline to receive medical services by telemedicine and may withdraw from such care at any time. Patient verbally consented to receive this service via voice-only telephone call.    HPI: Mrs. Hung is a 68 y.o. female with ovarian cancer, managed per Dr. Hodgson. Of note, imaging has shown progression of disease and Mrs. Hung has been hospitalized twice for partial SBO.      Assessment and plan:      Malignant Neoplasm of ovary  - Patient originally diagnosed in 2020 with recurrence of disease in 2022.   - Managed by Dr. Hodgson, last seen on 2/19/24   - At the last appointment, Dr. Hodgson stopped treatment due to rising CA-125 and treatment side effects   - She is planning to start the 3 drug regimen. This will include an oral chemo drug, which she will take 1 week prior to IV infusion. She is unsure when she will start this. I will reach out to Dr. Hodgson office.   - 1/25/24 : 1236   - Patient was seen by me in the ER on 11/16/23. She was seen in the ED and found to have a partial small bowel obstruction   - Mrs. Hung reports she is struggling with "do I go through all of this" and "where do I draw the line". She reports she is having treatment side effects and she is worried about having more symptoms and " "becoming more debilitated. She is contemplating how does she want the rest of her time here to be spent.   - Mrs. Hung reports that Dr. Hodgson told her there is a 30% change of response to the drugs regimen. She further stated "that is not very good". I encouraged Mrs. Hung to ask Dr. Hodgson what prognosis would be with further treatment versus stopping further treatment.     Advanced care planning/ counseling discussion  - Patient is decisional and alone on today's audio visit  - Seen alongside Tatiana Spence   - ACP documents uploaded into EMR. Patient completed HCPOA, dated 6/17/2020 designating Cody Ferrara. Patient also has LW on file dated 6/17/2020.   - Goals: symptom management, attempting next regimen with oral chemo/ IV infusion to see if it halts disease and improves symptoms.   - Philosophy of palliative medicine reviewed with patient   - Discussed with patient about what is important to her. Patient spoke about spending time with her family, ensuring she is comfortable and focusing on quality of life.   - Patient turns to her , Cody, for support. She has been  to her  for 37 years. 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   - She has always enjoyed integrative medicine and was receiving body therapy at [facility], but she has been unable to go to the facility due to weakness/ debility.   - She is very in touch with the mind body spirit relationship. She reports she feels that body therapy is her anecdote to modern medicine.   - Mrs. Hung spoke at length that this is very difficult as she has not been through this before and her "life and world have gotten a lot smaller". She spoke further about how she turns to her mind/ body relationship and how she sees this challenge as a part of her spiritual journey.   - She is worried about getting to the points of "cancer taking over my body completely and I am a sitting duck". Emotional " "support provided.   - She spoke about her , Cody. He is very supportive of her and he is struggling with being her caregiver and how to keep his law firm afloat. Mrs. Hung reports this diagnosis has impacted a "change in our relationship".     Neoplastic related fatigue  - Discussed with patient about being as active as possible and maximizing pain relief with functionality   - Patient reports severe debility and fatigue. She was able to go to the store yesterday but this was very difficult for her.   - Patient reports she is now 105 pounds. We discussed her nutrition and she reports her "body is complicated" as she has several food intolerances and food allergies.     Diarrhea  - Patient reports explosive diarrhea for the past 2 months. She reports taking OTC Imodium.     LA  Reviewed and Summarized:   11/21/23 Lorazepam 0.5 mg Disp: 40 for 13 days  9/21/23 Lorazepam 0.5 mg Disp: 60 for 30 days   12/1/22 Lorazepam 0.5 mg Disp: 10 for 2 days     We had a very transparent conversation with Mrs. Hung regarding her disease and that she is in charge to make the decision regarding continuing further treatment. I did encourage her to discuss with Dr. Hodgson regarding what her prognosis with further treatment would be like and what it would be like without treatment. We further spoke about how we are here to support her along this journey. She wants to attempt further treatment in hopes of improving symptoms. She did state that in the event her symptoms do not improve, then she does not feel that she would desire to continue cancer directed treatment. We did briefly discuss hospice. She validated the word hospice is scary. Emotional support provided. She validated that she has never been through this before, which is understandably a lot. We discussed home based palliative care as getting out of the home for appointments  is very difficult. She is interested in speaking to two home based palliative care " [employer] for more support at home. I was very transparent that without an in person visit, I can not prescribe medicine, so I recommended home based palliative care.     Goals of care: Seeking further treatment to shrink disease/ managing symptoms     Prognosis: Guarded     Follow up: Will ask two home based palliative care companies to meet with Mrs. Hung. She has our number for follow up and will reach out. I anticipate Mrs. Hung will enroll with home based palliative care so they can manage her symptoms from home.     This service was not originating from a related E/M service provided within the previous 7 days nor will  to an E/M service or procedure within the next 24 hours or my soonest available appointment.  Prevailing standard of care was able to be met in this audio-only visit.      50 minutes total time spent on encounter

## 2024-02-29 NOTE — TELEPHONE ENCOUNTER
----- Message from Rupa Carter sent at 2/29/2024 11:05 AM CST -----  Regarding: Appointment Access              Name of Who is Calling:  Najma Hung    Who Left The Message:  Najma Hung    Message Is For: Mateus      What is the request in detail:       Patient called stating she's returning the Office's call and would like you to please call again.   Please further advise.   Thank you      Reply by [facility]: YES      Preferred Call Back : [phone]

## 2024-02-29 NOTE — PROGRESS NOTES
Contacted pt to confirm if she desires internal/external Home Health order. Requested pt return call and call back # provided, so order can be placed correctly.

## 2024-03-04 ENCOUNTER — PATIENT MESSAGE (OUTPATIENT)
Dept: GYNECOLOGIC ONCOLOGY | Facility: CLINIC | Age: 69
End: 2024-03-04
Payer: MEDICARE

## 2024-03-04 DIAGNOSIS — C56.3 MALIGNANT NEOPLASM OF BOTH OVARIES: ICD-10-CM

## 2024-03-04 RX ORDER — CYCLOPHOSPHAMIDE 50 MG/1
50 CAPSULE ORAL DAILY
Qty: 30 CAPSULE | Refills: 3 | Status: ACTIVE | OUTPATIENT
Start: 2024-03-04

## 2024-03-07 ENCOUNTER — LAB VISIT (OUTPATIENT)
Dept: LAB | Facility: HOSPITAL | Age: 69
End: 2024-03-07
Payer: MEDICARE

## 2024-03-07 ENCOUNTER — OFFICE VISIT (OUTPATIENT)
Dept: GYNECOLOGIC ONCOLOGY | Facility: CLINIC | Age: 69
End: 2024-03-07
Payer: MEDICARE

## 2024-03-07 VITALS
WEIGHT: 104.06 LBS | SYSTOLIC BLOOD PRESSURE: 148 MMHG | HEART RATE: 108 BPM | BODY MASS INDEX: 17.18 KG/M2 | DIASTOLIC BLOOD PRESSURE: 71 MMHG

## 2024-03-07 DIAGNOSIS — C56.9 MALIGNANT NEOPLASM OF UNSPECIFIED OVARY: ICD-10-CM

## 2024-03-07 DIAGNOSIS — Z51.11 ENCOUNTER FOR ANTINEOPLASTIC CHEMOTHERAPY: ICD-10-CM

## 2024-03-07 DIAGNOSIS — C56.3 MALIGNANT NEOPLASM OF BOTH OVARIES: Primary | ICD-10-CM

## 2024-03-07 DIAGNOSIS — C56.3 MALIGNANT NEOPLASM OF BOTH OVARIES: ICD-10-CM

## 2024-03-07 LAB
ALBUMIN SERPL BCP-MCNC: 3.1 G/DL (ref 3.5–5.2)
ALP SERPL-CCNC: 127 U/L (ref 55–135)
ALT SERPL W/O P-5'-P-CCNC: 20 U/L (ref 10–44)
ANION GAP SERPL CALC-SCNC: 9 MMOL/L (ref 8–16)
AST SERPL-CCNC: 31 U/L (ref 10–40)
BILIRUB SERPL-MCNC: 0.4 MG/DL (ref 0.1–1)
BUN SERPL-MCNC: 13 MG/DL (ref 8–23)
CALCIUM SERPL-MCNC: 9.5 MG/DL (ref 8.7–10.5)
CANCER AG125 SERPL-ACNC: 2864 U/ML (ref 0–30)
CHLORIDE SERPL-SCNC: 99 MMOL/L (ref 95–110)
CO2 SERPL-SCNC: 26 MMOL/L (ref 23–29)
CREAT SERPL-MCNC: 0.7 MG/DL (ref 0.5–1.4)
ERYTHROCYTE [DISTWIDTH] IN BLOOD BY AUTOMATED COUNT: 13.7 % (ref 11.5–14.5)
EST. GFR  (NO RACE VARIABLE): >60 ML/MIN/1.73 M^2
GLUCOSE SERPL-MCNC: 121 MG/DL (ref 70–110)
HCT VFR BLD AUTO: 32.2 % (ref 37–48.5)
HGB BLD-MCNC: 10.1 G/DL (ref 12–16)
IMM GRANULOCYTES # BLD AUTO: 0.02 K/UL (ref 0–0.04)
MCH RBC QN AUTO: 29.6 PG (ref 27–31)
MCHC RBC AUTO-ENTMCNC: 31.4 G/DL (ref 32–36)
MCV RBC AUTO: 94 FL (ref 82–98)
NEUTROPHILS # BLD AUTO: 3.9 K/UL (ref 1.8–7.7)
PLATELET # BLD AUTO: 247 K/UL (ref 150–450)
PMV BLD AUTO: 8.6 FL (ref 9.2–12.9)
POTASSIUM SERPL-SCNC: 3.6 MMOL/L (ref 3.5–5.1)
PROT SERPL-MCNC: 7.1 G/DL (ref 6–8.4)
RBC # BLD AUTO: 3.41 M/UL (ref 4–5.4)
SODIUM SERPL-SCNC: 134 MMOL/L (ref 136–145)
T4 FREE SERPL-MCNC: 1.1 NG/DL (ref 0.71–1.51)
TSH SERPL DL<=0.005 MIU/L-ACNC: 2.6 UIU/ML (ref 0.4–4)
WBC # BLD AUTO: 5.17 K/UL (ref 3.9–12.7)

## 2024-03-07 PROCEDURE — 99999 PR PBB SHADOW E&M-EST. PATIENT-LVL III: CPT | Mod: PBBFAC,,, | Performed by: OBSTETRICS & GYNECOLOGY

## 2024-03-07 PROCEDURE — 84443 ASSAY THYROID STIM HORMONE: CPT | Performed by: NURSE PRACTITIONER

## 2024-03-07 PROCEDURE — 99213 OFFICE O/P EST LOW 20 MIN: CPT | Mod: PBBFAC | Performed by: OBSTETRICS & GYNECOLOGY

## 2024-03-07 PROCEDURE — 84439 ASSAY OF FREE THYROXINE: CPT | Performed by: NURSE PRACTITIONER

## 2024-03-07 PROCEDURE — 80053 COMPREHEN METABOLIC PANEL: CPT | Performed by: OBSTETRICS & GYNECOLOGY

## 2024-03-07 PROCEDURE — 36415 COLL VENOUS BLD VENIPUNCTURE: CPT | Performed by: OBSTETRICS & GYNECOLOGY

## 2024-03-07 PROCEDURE — 86304 IMMUNOASSAY TUMOR CA 125: CPT | Performed by: OBSTETRICS & GYNECOLOGY

## 2024-03-07 PROCEDURE — 85027 COMPLETE CBC AUTOMATED: CPT | Performed by: OBSTETRICS & GYNECOLOGY

## 2024-03-07 PROCEDURE — 99215 OFFICE O/P EST HI 40 MIN: CPT | Mod: S$PBB,,, | Performed by: OBSTETRICS & GYNECOLOGY

## 2024-03-07 RX ORDER — SODIUM CHLORIDE 0.9 % (FLUSH) 0.9 %
10 SYRINGE (ML) INJECTION
Status: CANCELLED | OUTPATIENT
Start: 2024-03-07

## 2024-03-07 RX ORDER — EPINEPHRINE 0.3 MG/.3ML
0.3 INJECTION SUBCUTANEOUS ONCE AS NEEDED
Status: CANCELLED | OUTPATIENT
Start: 2024-03-07

## 2024-03-07 RX ORDER — PROCHLORPERAZINE EDISYLATE 5 MG/ML
5 INJECTION INTRAMUSCULAR; INTRAVENOUS ONCE AS NEEDED
Status: CANCELLED
Start: 2024-03-07

## 2024-03-07 RX ORDER — TRAMADOL HYDROCHLORIDE 50 MG/1
50 TABLET ORAL EVERY 6 HOURS PRN
Qty: 30 TABLET | Refills: 3 | Status: SHIPPED | OUTPATIENT
Start: 2024-03-07

## 2024-03-07 RX ORDER — HEPARIN 100 UNIT/ML
500 SYRINGE INTRAVENOUS
Status: CANCELLED | OUTPATIENT
Start: 2024-03-07

## 2024-03-07 RX ORDER — DIPHENHYDRAMINE HYDROCHLORIDE 50 MG/ML
50 INJECTION INTRAMUSCULAR; INTRAVENOUS ONCE AS NEEDED
Status: CANCELLED | OUTPATIENT
Start: 2024-03-07

## 2024-03-07 NOTE — PROGRESS NOTES
Subjective:      Patient ID: Najma Hung is a 68 y.o. female.    Chief Complaint: Chemotherapy    Treatment History  Stage IIIC high-grade serous ovarian cancer  Xlap/BSO/Omentectomy/Pelvic and PA nodes/Peritoneal stripping and debulking 6/20 by Dr. Olivier  T/C x 2, then Carbo x 3 due to patient intolerance of treatment completed 1/28/21  Elected to not pursue maintenance PARPi  CARIS testing negative  Germline normal, CDX positive  Recurrence documented 5/22, received Carbo + neulasta x 1  No treatment until 11/22, when multifocal progression documented on PET  Doxil/Carbo x 4 completed 4/23  Delayed PARPi therapy until starting Lynparza 200 BID 6/23  Progression of disease with recent hospitalization x 2 for partial SBO    HPI  Her today for assessment prior to C1 of treatment.  CBC back and looks ok but other labs pending.  Needs to sign consents priorto treatment tomorrow.  Was enrolled in the [facility]IM program with palliative care.  Review of Systems   Constitutional:  Negative for activity change, appetite change, chills, fatigue and fever.   HENT:  Negative for hearing loss, mouth sores, nosebleeds, sore throat and tinnitus.    Eyes:  Negative for visual disturbance.   Respiratory:  Negative for cough, chest tightness, shortness of breath and wheezing.    Cardiovascular:  Negative for chest pain and leg swelling.   Gastrointestinal:  Negative for abdominal distention, abdominal pain, blood in stool, constipation, diarrhea, nausea and vomiting.   Genitourinary:  Negative for dysuria, flank pain, frequency, hematuria, pelvic pain, vaginal bleeding, vaginal discharge and vaginal pain.   Musculoskeletal:  Negative for arthralgias and back pain.   Skin:  Negative for rash.   Neurological:  Negative for dizziness, seizures, syncope, weakness and numbness.   Hematological:  Does not bruise/bleed easily.   Psychiatric/Behavioral:  Negative for confusion and sleep disturbance. The patient is not nervous/anxious.         Objective:   Physical Exam:   Constitutional: She is oriented to person, place, and time. She appears well-developed and well-nourished. No distress.    HENT:   Head: Normocephalic and atraumatic.    Eyes: No scleral icterus.     Cardiovascular:       Exam reveals no cyanosis and no edema.        Pulmonary/Chest: Effort normal. No respiratory distress. She exhibits no tenderness.        Abdominal: Soft. She exhibits no distension, no fluid wave and no mass. There is no abdominal tenderness. There is no rebound and no guarding. No hernia.             Musculoskeletal: Normal range of motion and moves all extremeties. No edema.      Lymphadenopathy:     She has no cervical adenopathy.    Neurological: She is alert and oriented to person, place, and time.    Skin: Skin is warm and dry. No cyanosis. No pallor.    Psychiatric: She has a normal mood and affect. Her behavior is normal.       Assessment:     1. Malignant neoplasm of both ovaries    2. Encounter for antineoplastic chemotherapy        Plan:       Will send in Toradol Rx.  Plan to treat tomorrow assuming labs return ok.  She is wavering between continued therapy and hospice.  I told her given minimal SE associated with current regimen, we should proceed with 2-3 cycles to see if we can palliate some smptoms and then make a decision.  They were in agreement.  RTC as scheduled.

## 2024-03-08 ENCOUNTER — INFUSION (OUTPATIENT)
Dept: INFUSION THERAPY | Facility: HOSPITAL | Age: 69
End: 2024-03-08
Payer: MEDICARE

## 2024-03-08 VITALS
DIASTOLIC BLOOD PRESSURE: 70 MMHG | HEIGHT: 66 IN | BODY MASS INDEX: 16.72 KG/M2 | SYSTOLIC BLOOD PRESSURE: 148 MMHG | RESPIRATION RATE: 16 BRPM | HEART RATE: 86 BPM | WEIGHT: 104.06 LBS | TEMPERATURE: 98 F | OXYGEN SATURATION: 95 %

## 2024-03-08 DIAGNOSIS — C56.3 MALIGNANT NEOPLASM OF BOTH OVARIES: Primary | ICD-10-CM

## 2024-03-08 PROCEDURE — 25000003 PHARM REV CODE 250: Performed by: OBSTETRICS & GYNECOLOGY

## 2024-03-08 PROCEDURE — 96417 CHEMO IV INFUS EACH ADDL SEQ: CPT

## 2024-03-08 PROCEDURE — 63600175 PHARM REV CODE 636 W HCPCS: Mod: JZ,JG | Performed by: OBSTETRICS & GYNECOLOGY

## 2024-03-08 PROCEDURE — 96413 CHEMO IV INFUSION 1 HR: CPT

## 2024-03-08 PROCEDURE — A4216 STERILE WATER/SALINE, 10 ML: HCPCS | Performed by: OBSTETRICS & GYNECOLOGY

## 2024-03-08 RX ORDER — HEPARIN 100 UNIT/ML
500 SYRINGE INTRAVENOUS
Status: DISCONTINUED | OUTPATIENT
Start: 2024-03-08 | End: 2024-03-08 | Stop reason: HOSPADM

## 2024-03-08 RX ORDER — SODIUM CHLORIDE 0.9 % (FLUSH) 0.9 %
10 SYRINGE (ML) INJECTION
Status: CANCELLED | OUTPATIENT
Start: 2024-03-08

## 2024-03-08 RX ORDER — SODIUM CHLORIDE 0.9 % (FLUSH) 0.9 %
10 SYRINGE (ML) INJECTION
Status: DISCONTINUED | OUTPATIENT
Start: 2024-03-08 | End: 2024-03-08 | Stop reason: HOSPADM

## 2024-03-08 RX ORDER — HEPARIN 100 UNIT/ML
500 SYRINGE INTRAVENOUS
Status: CANCELLED | OUTPATIENT
Start: 2024-03-08

## 2024-03-08 RX ORDER — DIPHENHYDRAMINE HYDROCHLORIDE 50 MG/ML
50 INJECTION INTRAMUSCULAR; INTRAVENOUS ONCE AS NEEDED
Status: DISCONTINUED | OUTPATIENT
Start: 2024-03-08 | End: 2024-03-08 | Stop reason: HOSPADM

## 2024-03-08 RX ORDER — EPINEPHRINE 0.3 MG/.3ML
0.3 INJECTION SUBCUTANEOUS ONCE AS NEEDED
Status: DISCONTINUED | OUTPATIENT
Start: 2024-03-08 | End: 2024-03-08 | Stop reason: HOSPADM

## 2024-03-08 RX ORDER — PROCHLORPERAZINE EDISYLATE 5 MG/ML
5 INJECTION INTRAMUSCULAR; INTRAVENOUS ONCE AS NEEDED
Status: DISCONTINUED | OUTPATIENT
Start: 2024-03-08 | End: 2024-03-08 | Stop reason: HOSPADM

## 2024-03-08 RX ADMIN — HEPARIN 500 UNITS: 100 SYRINGE at 03:03

## 2024-03-08 RX ADMIN — SODIUM CHLORIDE 200 MG: 9 INJECTION, SOLUTION INTRAVENOUS at 02:03

## 2024-03-08 RX ADMIN — Medication 10 ML: at 03:03

## 2024-03-08 RX ADMIN — SODIUM CHLORIDE: 9 INJECTION, SOLUTION INTRAVENOUS at 01:03

## 2024-03-08 RX ADMIN — BEVACIZUMAB-AWWB 700 MG: 400 INJECTION, SOLUTION INTRAVENOUS at 02:03

## 2024-03-08 RX ADMIN — SODIUM CHLORIDE 1000 ML: 9 INJECTION, SOLUTION INTRAVENOUS at 01:03

## 2024-03-11 ENCOUNTER — PATIENT MESSAGE (OUTPATIENT)
Dept: GYNECOLOGIC ONCOLOGY | Facility: CLINIC | Age: 69
End: 2024-03-11
Payer: MEDICARE

## 2024-03-11 ENCOUNTER — TELEPHONE (OUTPATIENT)
Dept: GYNECOLOGIC ONCOLOGY | Facility: CLINIC | Age: 69
End: 2024-03-11
Payer: MEDICARE

## 2024-03-11 DIAGNOSIS — Z51.11 ENCOUNTER FOR ANTINEOPLASTIC CHEMOTHERAPY: ICD-10-CM

## 2024-03-11 DIAGNOSIS — C56.3 MALIGNANT NEOPLASM OF BOTH OVARIES: Primary | ICD-10-CM

## 2024-03-11 NOTE — TELEPHONE ENCOUNTER
----- Message from Дмитрий Calles sent at 3/11/2024  3:04 PM CDT -----  Regarding: OT  Name of Who is Calling:  Terri calling from Home Health Agency          What is the request in detail:  Please contact Terri she would like to speak with Dr. Hodgson about patient OT            Can the clinic reply by [facility]:             What [facility] to Call Back [facility]: [phone]

## 2024-03-12 ENCOUNTER — PATIENT MESSAGE (OUTPATIENT)
Dept: GYNECOLOGIC ONCOLOGY | Facility: CLINIC | Age: 69
End: 2024-03-12
Payer: MEDICARE

## 2024-03-12 ENCOUNTER — TELEPHONE (OUTPATIENT)
Dept: GYNECOLOGIC ONCOLOGY | Facility: CLINIC | Age: 69
End: 2024-03-12
Payer: MEDICARE

## 2024-03-12 RX ORDER — LIDOCAINE AND PRILOCAINE 25; 25 MG/G; MG/G
CREAM TOPICAL
Qty: 30 G | Refills: 2 | Status: SHIPPED | OUTPATIENT
Start: 2024-03-12

## 2024-03-12 NOTE — TELEPHONE ENCOUNTER
Returned call and was a wrong number. Also, cytoxan was sent to [facility] on 3/4, not [facility]    ----- Message from Too Cardozo sent at 3/12/2024  9:30 AM CDT -----   Name of Who is Calling:     What is the request in detail: pharmacy request call back in reference to clarity on medication   cycloPHOSphamide (CYTOXAN) 50 mg capsule   [[id number]]    /  need correct dosage / current height and weight  Please contact to further discuss and advise      Can the clinic reply by [facility]:     What Number to Call Back if not in [facility]:  [facility]  / [phone] fax# [phone]

## 2024-03-13 ENCOUNTER — TELEPHONE (OUTPATIENT)
Dept: GYNECOLOGIC ONCOLOGY | Facility: CLINIC | Age: 69
End: 2024-03-13
Payer: MEDICARE

## 2024-03-13 ENCOUNTER — TELEMEDICINE (OUTPATIENT)
Dept: PALLIATIVE MEDICINE | Facility: CLINIC | Age: 69
End: 2024-03-13
Payer: MEDICARE

## 2024-03-13 ENCOUNTER — TELEPHONE (OUTPATIENT)
Dept: HEMATOLOGY/ONCOLOGY | Facility: CLINIC | Age: 69
End: 2024-03-13
Payer: MEDICARE

## 2024-03-13 ENCOUNTER — PATIENT MESSAGE (OUTPATIENT)
Dept: GYNECOLOGIC ONCOLOGY | Facility: CLINIC | Age: 69
End: 2024-03-13
Payer: MEDICARE

## 2024-03-13 ENCOUNTER — TELEPHONE (OUTPATIENT)
Dept: INFUSION THERAPY | Facility: HOSPITAL | Age: 69
End: 2024-03-13
Payer: MEDICARE

## 2024-03-13 DIAGNOSIS — C56.3 MALIGNANT NEOPLASM OF BOTH OVARIES: ICD-10-CM

## 2024-03-13 DIAGNOSIS — G89.3 CANCER ASSOCIATED PAIN: ICD-10-CM

## 2024-03-13 DIAGNOSIS — C56.3 MALIGNANT NEOPLASM OF BOTH OVARIES: Primary | ICD-10-CM

## 2024-03-13 RX ORDER — HYDROMORPHONE HYDROCHLORIDE 2 MG/1
1 TABLET ORAL
Qty: 20 TABLET | Refills: 0 | Status: SHIPPED | OUTPATIENT
Start: 2024-03-13 | End: 2024-03-13

## 2024-03-13 RX ORDER — HYDROMORPHONE HYDROCHLORIDE 2 MG/1
1 TABLET ORAL
Qty: 20 TABLET | Refills: 0 | Status: SHIPPED | OUTPATIENT
Start: 2024-03-13 | End: 2024-03-25 | Stop reason: SDUPTHER

## 2024-03-13 NOTE — TELEPHONE ENCOUNTER
----- Message from Too Cardozo sent at 3/13/2024 12:51 PM CDT -----   Name of Who is Calling:     What is the request in detail: patient request call back in reference to medication  HYDROmorphone (DILAUDID) 2 MG tablet   / patient insurance will only cover if send to pharmacy listed below   Please contact to further discuss and advise      Can the clinic reply by [facility]:     What Number to Call Back if not in [facility]:   [phone]      [facility]/PH[facility]ACY #[id number] - [place] - [address]

## 2024-03-13 NOTE — PROGRESS NOTES
Edema much improved.  Continue with elevation and exercises  Venous US:  Both legs greater saphenous veins with valvular incompetency  Causing swelling.  Continue with compression hose  FU as needed.  Call if desire to proceed with evaluation for intervention.    Received a message from Doris Willson asking for pain medication advise for Mrs. Hung. Per notes, Mrs. Hung has been taking Tylenol suppository with night sweats. She spoke to a pharmacist at [place] [facility] regarding medication management. Mrs. Hung reports allergy to codeine.    I saw Mrs. Hung on a telephone only visit on 2/27/24.     Today, I contacted Mrs. Hung via telephone. She reports ongoing pain in her abdomen. At rest she rates it at an 8. She denies trying heating pad. She reports using Tylenol and Tramadol without relief. We discussed medications, she would like to try dilaudid due to relief with this in the hospitalization. I would recommend 2 mg PO Dilaudid utilizing half a tab every 6 H PRN. Due to not seeing Mrs. Hung in person, I am unable to prescribe this. Discussed with Doris Willson who will prescribe medication. Patient denies constipation.     Mrs. Hung reports she is currently receiving pall care though [facility] [place], but they are unable to manage symptoms. She reports it is a  and  a nurse helping her. I did verify this with [facility]. I did speak to Mrs. Hung about switching to another palliative care agency that has a formal home based program, but she would like to hold off on this. Therefore, we scheduled a telephone visit with me on 4/9 at 12 PM.     Mrs. Hung is also speaking to a death , which she finds useful.    Mrs. Hung has my contact information and will reach out with any questions/ concerns.     25 minutes spent in telephone visit.

## 2024-03-13 NOTE — TELEPHONE ENCOUNTER
----- Message from Too Cardozo sent at 3/13/2024 11:16 AM CDT -----   Name of Who is Calling:     What is the request in detail: patient request call back in reference to pain medication DILAUDID 2mg / patient want to know  will send a  prescription to pharmacy     Please contact to further discuss and advise      Can the clinic reply by [facility]:     What Number to Call Back if not in [facility]:   [phone]

## 2024-03-13 NOTE — TELEPHONE ENCOUNTER
Per palliative NP, pt has been speaking to the pharmacist about diluadid PO. It only comes in 2 mg tablets and NP would recommend her taking a half tab every 6 hours PRN.   The company she is with for home based palliative care has a SW and RN but does not have a NP to manage meds. She is comfortable with the agency and does not want to switch to a different agency. Pall NP told her this is fine and I will continue doing virtual visits with her.   Messaging pt and will send dilaudid to pharmacy.    ----- Message from Marita Moser RN sent at 3/13/2024 11:27 AM CDT -----  Regarding: pain  Needs prescription sent for Dilaudid po.     Marita        ----- Message -----  From: Too Cardozo  Sent: 3/13/2024  11:23 AM CDT  To: Rema Allan Staff     Name of Who is Calling:     What is the request in detail: patient request call back in reference to pain medication DILAUDID 2mg / patient want to know  will send a  prescription to pharmacy     Please contact to further discuss and advise      Can the clinic reply by [facility]:     What Number to Call Back if not in [facility]:   [phone]

## 2024-03-13 NOTE — TELEPHONE ENCOUNTER
LVM in regards to offering nutrition appt. MA instructed pt. in voicemail to call the office number for scheduling.                 FAYE GONZALEZ ext [phone]

## 2024-03-15 ENCOUNTER — PATIENT MESSAGE (OUTPATIENT)
Dept: GYNECOLOGIC ONCOLOGY | Facility: CLINIC | Age: 69
End: 2024-03-15
Payer: MEDICARE

## 2024-03-18 ENCOUNTER — PATIENT MESSAGE (OUTPATIENT)
Dept: GYNECOLOGIC ONCOLOGY | Facility: CLINIC | Age: 69
End: 2024-03-18
Payer: MEDICARE

## 2024-03-18 ENCOUNTER — DOCUMENTATION ONLY (OUTPATIENT)
Dept: PALLIATIVE MEDICINE | Facility: OTHER | Age: 69
End: 2024-03-18
Payer: MEDICARE

## 2024-03-18 ENCOUNTER — TELEPHONE (OUTPATIENT)
Dept: HEMATOLOGY/ONCOLOGY | Facility: CLINIC | Age: 69
End: 2024-03-18
Payer: MEDICARE

## 2024-03-18 NOTE — PROGRESS NOTES
"       Patient called the office to discuss some concerns.  She informed me she just finished her chemo today. She took zofran this time with chemo that she previously hasn't tried before due to the constipation effects that zofran has on her. She said it went okay this time.  She has been, however, disappointed that the dilaudid only lasts 2 hours and not 4 hours for her pain needs. Explained that meds may need to be adjusted, and she verb understanding. She did admit that it is "trial and error." Agreed with her on this.   Her number one concern today is her kidney function. She reports that she does not want the chemo to "blow out her kidneys." She is drinking "tons of water" in hopes to minimize the damage. Allowed time and silence for expression of concerns. Provided emotional support and reassurance. She appreciated this. Encouraged her to call for any concerns. Will continue to follow along and communicate with Doris Willson NP.        "

## 2024-03-18 NOTE — TELEPHONE ENCOUNTER
Spoke w/ pt in regards to scheduling nutrition appt. Pt was hesitate to schedule bc pt thinks it may be a waste of time. Pt reported seeing Rhoda Bauer RD in the past and nothing works as far as diet plan. Pt has very little energy to sit up to get laptop open for a virtual appt. MA offer to schedule pt during infusion while pt is here on 4/1. Pt refuse also. Pt decided audio only would work on a day that pt has nothing else scheduled. Pt is scheduled w/ Mikhail Paiz RD for a audio only Wednesday 4/3 @ 1PM.       [place], [place] ext [phone]

## 2024-03-20 ENCOUNTER — TELEPHONE (OUTPATIENT)
Dept: GYNECOLOGIC ONCOLOGY | Facility: CLINIC | Age: 69
End: 2024-03-20
Payer: MEDICARE

## 2024-03-20 NOTE — TELEPHONE ENCOUNTER
"Talked to Darren /psycotherapist had talked very well for the 1st visit. He call to setup the next visit and stated that she was busy with everything such as chemo, PT and prefer that she will call him when she needs him. We discussed that if she is emotional spent, we can tell her to call Darren to "talk". She does have his number. No other question.   "

## 2024-03-20 NOTE — TELEPHONE ENCOUNTER
----- Message from Ann Marie Valerio sent at 3/20/2024  9:56 AM CDT -----  Name of Who is Calling:  on behalf of YESSENIA MCDONALD [[id number]]            What is the request in detail:called to inform pt declined second social work visit bc to tired prefers to  for call visit instead              Can the clinic reply by [facility]:               What Number to Call [phone] ana

## 2024-03-22 ENCOUNTER — TELEPHONE (OUTPATIENT)
Dept: GYNECOLOGIC ONCOLOGY | Facility: CLINIC | Age: 69
End: 2024-03-22
Payer: MEDICARE

## 2024-03-25 ENCOUNTER — TELEPHONE (OUTPATIENT)
Dept: GYNECOLOGIC ONCOLOGY | Facility: CLINIC | Age: 69
End: 2024-03-25
Payer: MEDICARE

## 2024-03-25 ENCOUNTER — OFFICE VISIT (OUTPATIENT)
Dept: GYNECOLOGIC ONCOLOGY | Facility: CLINIC | Age: 69
End: 2024-03-25
Payer: MEDICARE

## 2024-03-25 ENCOUNTER — HOSPITAL ENCOUNTER (OUTPATIENT)
Dept: RADIOLOGY | Facility: OTHER | Age: 69
Discharge: HOME | End: 2024-03-25
Attending: OBSTETRICS & GYNECOLOGY
Payer: MEDICARE

## 2024-03-25 ENCOUNTER — PATIENT MESSAGE (OUTPATIENT)
Dept: GYNECOLOGIC ONCOLOGY | Facility: CLINIC | Age: 69
End: 2024-03-25

## 2024-03-25 DIAGNOSIS — C56.3 MALIGNANT NEOPLASM OF BOTH OVARIES: ICD-10-CM

## 2024-03-25 DIAGNOSIS — C56.3 MALIGNANT NEOPLASM OF BOTH OVARIES: Primary | ICD-10-CM

## 2024-03-25 DIAGNOSIS — G89.3 CANCER ASSOCIATED PAIN: ICD-10-CM

## 2024-03-25 PROCEDURE — 76770 US EXAM ABDO BACK WALL COMP: CPT | Mod: TC

## 2024-03-25 PROCEDURE — 71046 X-RAY EXAM CHEST 2 VIEWS: CPT | Mod: 26,,, | Performed by: RADIOLOGY

## 2024-03-25 PROCEDURE — 71046 X-RAY EXAM CHEST 2 VIEWS: CPT | Mod: TC,FY

## 2024-03-25 PROCEDURE — 99215 OFFICE O/P EST HI 40 MIN: CPT | Mod: 95,,, | Performed by: OBSTETRICS & GYNECOLOGY

## 2024-03-25 PROCEDURE — 76770 US EXAM ABDO BACK WALL COMP: CPT | Mod: 26,,, | Performed by: RADIOLOGY

## 2024-03-25 RX ORDER — HYDROMORPHONE HYDROCHLORIDE 2 MG/1
1 TABLET ORAL
Qty: 60 TABLET | Refills: 0 | Status: SHIPPED | OUTPATIENT
Start: 2024-03-25 | End: 2025-03-25

## 2024-03-25 NOTE — TELEPHONE ENCOUNTER
Spoke with pt. Pt decided to have her US and Xray done today at [facility] location 2:15pm and 3:15pm. Pt confirmed both appts, and verbalized understanding.

## 2024-04-01 ENCOUNTER — INFUSION (OUTPATIENT)
Dept: INFUSION THERAPY | Facility: HOSPITAL | Age: 69
End: 2024-04-01
Payer: MEDICARE

## 2024-04-01 VITALS
WEIGHT: 104.06 LBS | DIASTOLIC BLOOD PRESSURE: 70 MMHG | OXYGEN SATURATION: 94 % | BODY MASS INDEX: 16.72 KG/M2 | HEIGHT: 66 IN | HEART RATE: 108 BPM | SYSTOLIC BLOOD PRESSURE: 160 MMHG | RESPIRATION RATE: 17 BRPM | TEMPERATURE: 98 F

## 2024-04-01 DIAGNOSIS — C56.3 MALIGNANT NEOPLASM OF BOTH OVARIES: Primary | ICD-10-CM

## 2024-04-01 PROCEDURE — 25000003 PHARM REV CODE 250: Performed by: PEDIATRICS

## 2024-04-01 PROCEDURE — 96413 CHEMO IV INFUSION 1 HR: CPT

## 2024-04-01 PROCEDURE — A4216 STERILE WATER/SALINE, 10 ML: HCPCS | Performed by: OBSTETRICS & GYNECOLOGY

## 2024-04-01 PROCEDURE — 25000003 PHARM REV CODE 250: Performed by: OBSTETRICS & GYNECOLOGY

## 2024-04-01 PROCEDURE — 63600175 PHARM REV CODE 636 W HCPCS: Mod: JZ,JG | Performed by: OBSTETRICS & GYNECOLOGY

## 2024-04-01 PROCEDURE — 96417 CHEMO IV INFUS EACH ADDL SEQ: CPT

## 2024-04-01 RX ORDER — PROCHLORPERAZINE EDISYLATE 5 MG/ML
5 INJECTION INTRAMUSCULAR; INTRAVENOUS ONCE AS NEEDED
Status: CANCELLED
Start: 2024-04-01

## 2024-04-01 RX ORDER — DIPHENHYDRAMINE HYDROCHLORIDE 50 MG/ML
50 INJECTION INTRAMUSCULAR; INTRAVENOUS ONCE AS NEEDED
Status: DISCONTINUED | OUTPATIENT
Start: 2024-04-01 | End: 2024-04-01 | Stop reason: HOSPADM

## 2024-04-01 RX ORDER — SODIUM CHLORIDE 0.9 % (FLUSH) 0.9 %
10 SYRINGE (ML) INJECTION
Status: CANCELLED | OUTPATIENT
Start: 2024-04-01

## 2024-04-01 RX ORDER — PROCHLORPERAZINE EDISYLATE 5 MG/ML
5 INJECTION INTRAMUSCULAR; INTRAVENOUS ONCE AS NEEDED
Status: DISCONTINUED | OUTPATIENT
Start: 2024-04-01 | End: 2024-04-01 | Stop reason: HOSPADM

## 2024-04-01 RX ORDER — HEPARIN 100 UNIT/ML
500 SYRINGE INTRAVENOUS
Status: DISCONTINUED | OUTPATIENT
Start: 2024-04-01 | End: 2024-04-01 | Stop reason: HOSPADM

## 2024-04-01 RX ORDER — EPINEPHRINE 0.3 MG/.3ML
0.3 INJECTION SUBCUTANEOUS ONCE AS NEEDED
Status: CANCELLED | OUTPATIENT
Start: 2024-04-01

## 2024-04-01 RX ORDER — DIPHENHYDRAMINE HYDROCHLORIDE 50 MG/ML
50 INJECTION INTRAMUSCULAR; INTRAVENOUS ONCE AS NEEDED
Status: CANCELLED | OUTPATIENT
Start: 2024-04-01

## 2024-04-01 RX ORDER — SODIUM CHLORIDE 0.9 % (FLUSH) 0.9 %
10 SYRINGE (ML) INJECTION
Status: DISCONTINUED | OUTPATIENT
Start: 2024-04-01 | End: 2024-04-01 | Stop reason: HOSPADM

## 2024-04-01 RX ORDER — EPINEPHRINE 0.3 MG/.3ML
0.3 INJECTION SUBCUTANEOUS ONCE AS NEEDED
Status: DISCONTINUED | OUTPATIENT
Start: 2024-04-01 | End: 2024-04-01 | Stop reason: HOSPADM

## 2024-04-01 RX ORDER — HEPARIN 100 UNIT/ML
500 SYRINGE INTRAVENOUS
Status: CANCELLED | OUTPATIENT
Start: 2024-04-01

## 2024-04-01 RX ADMIN — SODIUM CHLORIDE 200 MG: 9 INJECTION, SOLUTION INTRAVENOUS at 04:04

## 2024-04-01 RX ADMIN — Medication 10 ML: at 05:04

## 2024-04-01 RX ADMIN — SODIUM CHLORIDE 1000 ML: 9 INJECTION, SOLUTION INTRAVENOUS at 03:04

## 2024-04-01 RX ADMIN — HEPARIN 500 UNITS: 100 SYRINGE at 05:04

## 2024-04-01 RX ADMIN — BEVACIZUMAB-AWWB 700 MG: 400 INJECTION, SOLUTION INTRAVENOUS at 05:04

## 2024-04-01 NOTE — PLAN OF CARE
Pt to clinic via wlc with sig other for Keytruda/MVASI infusions. Pt very weak. Unable to get an Urine at present. Requests IVF's Dr Hodgson aware and orders taken. Port accessed without difficulty. Good blood return.  Urine sent. Had a long discussion with patient re: end of life care, initiated by her. Meeting with hospice on Thursday at her home. Tolerated treatment well. Port deaccessed after flushing. From clinic in [facility].

## 2024-04-08 ENCOUNTER — PATIENT MESSAGE (OUTPATIENT)
Dept: GYNECOLOGIC ONCOLOGY | Facility: CLINIC | Age: 69
End: 2024-04-08
Payer: MEDICARE

## 2024-04-09 ENCOUNTER — OFFICE VISIT (OUTPATIENT)
Dept: PALLIATIVE MEDICINE | Facility: CLINIC | Age: 69
End: 2024-04-09
Payer: MEDICARE

## 2024-04-09 ENCOUNTER — TELEPHONE (OUTPATIENT)
Dept: GYNECOLOGIC ONCOLOGY | Facility: CLINIC | Age: 69
End: 2024-04-09
Payer: MEDICARE

## 2024-04-09 DIAGNOSIS — Z71.89 ADVANCED CARE PLANNING/COUNSELING DISCUSSION: Primary | ICD-10-CM

## 2024-04-09 DIAGNOSIS — J90 PLEURAL EFFUSION: ICD-10-CM

## 2024-04-09 DIAGNOSIS — C56.3 MALIGNANT NEOPLASM OF BOTH OVARIES: Primary | ICD-10-CM

## 2024-04-09 PROCEDURE — 99443 PR PHYSICIAN TELEPHONE EVALUATION 21-30 MIN: CPT | Mod: 95,,, | Performed by: FAMILY MEDICINE

## 2024-04-09 NOTE — PROGRESS NOTES
"Established Patient - Audio Only Telehealth Visit     The patient location is: home   The chief complaint leading to consultation is: goals of care/ advance care planning   Visit type: Virtual visit with audio only (telephone)  Total time spent with patient:50 minutes      The reason for the audio only service rather than synchronous audio and video virtual visit was related to technical difficulties or patient preference/necessity.     Each patient to whom I provide medical services by telemedicine is:  (1) informed of the relationship between the physician and patient and the respective role of any other health care provider with respect to management of the patient; and (2) notified that they may decline to receive medical services by telemedicine and may withdraw from such care at any time. Patient verbally consented to receive this service via voice-only telephone call.    HPI: Mrs. Hung is a 68 y.o. female with ovarian cancer, managed per Dr. Hodgson. Of note, imaging has shown progression of disease and Mrs. Hung has been hospitalized twice for partial SBO. She has received 2 chemo infusions with continued SOB, pain, fatigue, loss of appetite.      Assessment and plan:     Malignant Neoplasm of ovary  - Patient originally diagnosed in 2020 with recurrence of disease in 2022.   - Managed by Dr. Hodgson, last seen on 3/7/24. Next appt scheduled on 4/18/24.   - Patient last received chemo on 4/1/24. She last saw Dr. Hodgson on 3/7/24: "told her given minimal SE associated with current regimen, we should proceed with 2-3 cycles to see if we can palliate some smptoms and then make a decision. They were in agreement."  - She was having SOB with CXR revealing small effusions, these results have not been discussed with her.   - 4/1/24 : 4410  - Patient was seen by me in the ER on 11/16/23. She was seen in the ED and found to have a partial small bowel obstruction   - Mrs. Hung reports she is very hopeful " "that she is able to die at home, however, she reports "I may have to go to the hospital". Mrs. Hung did meet with a hospice agency last week but prior to enrollment she was hoping to see Dr. Hodgson, however she stated "I don't think I will make it until the 18th". I was very transparent that I share the same concern. Mrs. Hung sounded very SOB on the phone with me today. Mrs. Hung stated that Dr. Hodgson said he would like to do one more cycle of chemo, but she stated "I don't think I can even get there". Emotional support provided.   - Mrs. Hung stated that she stopped taking oral chemo pills roughly 9 days ago. She reports oral intake/ nutrition is very poor.     Advanced care planning/ counseling discussion  - Patient is decisional and alone on today's audio visit  - Seen alongside Tatiana Spence   - ACP documents uploaded into EMR. Patient completed HCPOA, dated 6/17/2020 designating Cody Ferrara. Patient also has LW on file dated 6/17/2020.   - Goals: symptom management, passing away peacefully at home  - Philosophy of palliative medicine reviewed with patient   - Discussed with patient about what is important to her. Patient spoke about spending time with her family, ensuring she is comfortable and focusing on quality of life.   - Patient turns to her , Cody, for support. She has been  to her  for 37 years. 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   - She is very in touch with the mind body spirit relationship. She reports she feels that body therapy is her anecdote to modern medicine.   - Mrs. Hung spoke at length that this is very difficult as she has not been through this before and her "life and world have gotten a lot smaller".   - She spoke about her , Cody. He is very supportive of her and he is struggling with being her caregiver and how to keep his law firm afloat. Mrs. Hung reports this diagnosis has " "impacted a "change in our relationship". Mrs. Hung reports her  is working from home now and he is completely exhausted. She reports he wants "to go to the office to take a nap".   - Mrs. Hung further elaborated her wishes for a peaceful passing consistent with DNR/ DNI.     Neoplastic related fatigue  - Patient reports severe debility and fatigue. She is currently using a cane and reports she is primarily unable to get out of bed  - She reports poor oral intake, she is tolerating very small amounts of food.   - Mrs. Hung was prescribed dilaudid PO by Doris Willson. However, Mrs. Hung reports that she experienced hallucinations while taking this medication and she has stopped this. She is only using Tylenol for pain relief.     Diarrhea  - Patient reports explosive diarrhea for the past 3 months, she reports several bouts of diarrhea yesterday and taking a half of imodium yesterday. She reports taking OTC Imodium.     [place]  Reviewed and Summarized:   3/13/24 Hydromorphone 2 mg Disp: 20 for 7 days   3/7/24 Tramadol 50 mg Disp: 30 for 7 days   11/21/23 Lorazepam 0.5 mg Disp: 40 for 13 days  9/21/23 Lorazepam 0.5 mg Disp: 60 for 30 days   12/1/22 Lorazepam 0.5 mg Disp: 10 for 2 days     4/9/24  Mrs. Hung was very ill appearing and SOB on the phone today. She was very transparent that she is unsure if she can make it to her appointment with Dr. Hodgson. She has met with hospice rep and would like to proceed with hospice. She expressed concern regarding the feasibility of passing away at home as her symptoms are largely uncontrolled. She reports her PCP visited on Sunday, which she was appreciative of. Mrs. Hung would like to speak to Dr. Hodgson. I will place an order for hospice. We spoke at length about philosophy of hospice and how this would be a shift in our goals to comfort/ quality of life. Mrs. Hung further elaborated that she does not like when obituary's say "she lost her " "little to cancer." She spoke about how this is not a war little. Emotional support provided. Mrs. Hung was very transparent that she feels she is going through this alone, we affirmed that we are walking this journey with her.     2/27/24  We had a very transparent conversation with Mrs. Hung regarding her disease and that she is in charge to make the decision regarding continuing further treatment. I did encourage her to discuss with Dr. Hodgson regarding what her prognosis with further treatment would be like and what it would be like without treatment. We further spoke about how we are here to support her along this journey. She wants to attempt further treatment in hopes of improving symptoms. She did state that in the event her symptoms do not improve, then she does not feel that she would desire to continue cancer directed treatment. We did briefly discuss hospice. She validated the word hospice is scary. Emotional support provided. She validated that she has never been through this before, which is understandably a lot. We discussed home based palliative care as getting out of the home for appointments  is very difficult. She is interested in speaking to two home based palliative care companies for more support at home. I was very transparent that without an in person visit, I can not prescribe medicine, so I recommended home based palliative care.     Goals of care: Passing away peacefully at home, symptom management     Prognosis: Poor    Follow up: I anticipate patient will enroll in home hospice. I spoke to Doris Willson at length in Gyn onc office today. Doris will reach out to Dr. Hodgson.     This service was not originating from a related E/M service provided within the previous 7 days nor will  to an E/M service or procedure within the next 24 hours or my soonest available appointment.  Prevailing standard of care was able to be met in this audio-only visit.      50 minutes total time " spent on encounter

## 2024-04-09 NOTE — TELEPHONE ENCOUNTER
----- Message from Carlos Overton sent at 4/9/2024  3:56 PM CDT -----  Contact: Darren HUYNH  Type:  Patient Call          Who Called: [facility]- Darren HUYNH  (  )         Does the patient know what this is regarding?: Requesting a call back ;he was scheduled this week for a second visit with the pt pt denied the visit and told Mr Banks she would call him if she wanted to see him ; please advise         Best Call Back Number: [phone]            Additional Information:

## 2024-04-09 NOTE — PROGRESS NOTES
Contacted by palliative NP after phone call with pt with worsening SOB and no po intake. Standing thoracentesis order placed and EMILE Kirkland asked to contact pt for scheduling.  Per palliative NP, pt agreeable to hospice and order to be placed but will see if pleural drainage will increase pt comfort.

## 2024-04-11 ENCOUNTER — TELEPHONE (OUTPATIENT)
Dept: PALLIATIVE MEDICINE | Facility: OTHER | Age: 69
End: 2024-04-11
Payer: MEDICARE

## 2024-04-11 NOTE — TELEPHONE ENCOUNTER
Spoke to patient's , Neville. He reports the hospice agency is there to admit patient, but they need the order. We placed the referral to hospice on Tuesday during our virtual visit with the patient. Clarified with [facility] burnette (patient's preferred agency) that the order needs to be faxed to [phone]. Order faxed. Also clarified with Neville and patient was also listening on speaker phone, that she does not want to undergo any outpatient procedures, or meet virtually with the gyn onc team tomorrow. He reports that hospice is what she needs at this point. Will update Dr. Hodgson and the team.

## 2024-04-18 ENCOUNTER — TELEPHONE (OUTPATIENT)
Dept: GYNECOLOGIC ONCOLOGY | Facility: CLINIC | Age: 69
End: 2024-04-18
Payer: MEDICARE

## 2024-06-17 ENCOUNTER — EXTERNAL HOME HEALTH (OUTPATIENT)
Dept: HOME HEALTH SERVICES | Facility: HOSPITAL | Age: 69
End: 2024-06-17
Payer: MEDICARE

## 2024-08-29 NOTE — ASSESSMENT & PLAN NOTE
-VSS   -CBC stable, WBC 38, Plts 469 likely due to neulasta treatment  -CMP: K 4.9 Cr 1.1, Mag 1.9, Phos 5.2   -CT: Findings in keeping with acute small bowel obstruction, with a suspected transition to decompressed distal small bowel in the anterior lower abdomen, slightly to the left of midline, with relative proximity to remote operative sequela in the anterior abdominal wall. Enlarging of known right pleural effusion, which may be malignant. Lobular soft tissue within the mesentery and anterior abdominal wall, possibly metastasis.   -Antiemetics: scheduled Zofran IV and Compazine IV, PRN rectal phenergan  - Patient declined NG at time of admission. Reports traumatic experience with NGT during last admission.   -Diet: NPO>Transition to Clear liquid diet today  -IVF @ 38   -Pain control: Dilaudid PRN (not using), rectal tylenol   -Daily CMP/Mg/Phos: Replace electrolytes PRN with goal K >4.0, Mg > 2,  Phos >3   Last OV: 5/31/2024  Last RX:    Next scheduled apt: Visit date not found